# Patient Record
Sex: FEMALE | Race: WHITE | NOT HISPANIC OR LATINO | Employment: OTHER | URBAN - METROPOLITAN AREA
[De-identification: names, ages, dates, MRNs, and addresses within clinical notes are randomized per-mention and may not be internally consistent; named-entity substitution may affect disease eponyms.]

---

## 2017-10-10 DIAGNOSIS — E78.2 MIXED HYPERLIPIDEMIA: ICD-10-CM

## 2017-10-10 DIAGNOSIS — Z13.29 SCREENING FOR THYROID DISORDER: ICD-10-CM

## 2017-10-10 DIAGNOSIS — Z00.00 HEALTH CARE MAINTENANCE: ICD-10-CM

## 2017-10-10 DIAGNOSIS — E55.9 VITAMIN D DEFICIENCY: ICD-10-CM

## 2017-10-11 ENCOUNTER — HOSPITAL ENCOUNTER (OUTPATIENT)
Facility: MEDICAL CENTER | Age: 57
End: 2017-10-11
Attending: FAMILY MEDICINE
Payer: COMMERCIAL

## 2017-10-11 ENCOUNTER — NON-PROVIDER VISIT (OUTPATIENT)
Dept: INTERNAL MEDICINE | Facility: IMAGING CENTER | Age: 57
End: 2017-10-11
Payer: COMMERCIAL

## 2017-10-11 DIAGNOSIS — Z00.00 HEALTH CARE MAINTENANCE: ICD-10-CM

## 2017-10-11 DIAGNOSIS — Z13.29 SCREENING FOR THYROID DISORDER: ICD-10-CM

## 2017-10-11 DIAGNOSIS — Z23 NEED FOR INFLUENZA VACCINATION: ICD-10-CM

## 2017-10-11 DIAGNOSIS — E55.9 VITAMIN D DEFICIENCY: ICD-10-CM

## 2017-10-11 DIAGNOSIS — E78.2 MIXED HYPERLIPIDEMIA: ICD-10-CM

## 2017-10-11 DIAGNOSIS — Z01.89 ENCOUNTER FOR ROUTINE LABORATORY TESTING: ICD-10-CM

## 2017-10-11 LAB
25(OH)D3 SERPL-MCNC: 26 NG/ML (ref 30–100)
ALBUMIN SERPL BCP-MCNC: 4.5 G/DL (ref 3.2–4.9)
ALBUMIN/GLOB SERPL: 1.6 G/DL
ALP SERPL-CCNC: 52 U/L (ref 30–99)
ALT SERPL-CCNC: 15 U/L (ref 2–50)
ANION GAP SERPL CALC-SCNC: 8 MMOL/L (ref 0–11.9)
AST SERPL-CCNC: 23 U/L (ref 12–45)
BASOPHILS # BLD AUTO: 0.7 % (ref 0–1.8)
BASOPHILS # BLD: 0.03 K/UL (ref 0–0.12)
BILIRUB SERPL-MCNC: 0.3 MG/DL (ref 0.1–1.5)
BUN SERPL-MCNC: 13 MG/DL (ref 8–22)
CALCIUM SERPL-MCNC: 9.6 MG/DL (ref 8.5–10.5)
CHLORIDE SERPL-SCNC: 106 MMOL/L (ref 96–112)
CHOLEST SERPL-MCNC: 230 MG/DL (ref 100–199)
CO2 SERPL-SCNC: 27 MMOL/L (ref 20–33)
CREAT SERPL-MCNC: 0.71 MG/DL (ref 0.5–1.4)
EOSINOPHIL # BLD AUTO: 0.08 K/UL (ref 0–0.51)
EOSINOPHIL NFR BLD: 1.9 % (ref 0–6.9)
ERYTHROCYTE [DISTWIDTH] IN BLOOD BY AUTOMATED COUNT: 42 FL (ref 35.9–50)
GFR SERPL CREATININE-BSD FRML MDRD: >60 ML/MIN/1.73 M 2
GLOBULIN SER CALC-MCNC: 2.9 G/DL (ref 1.9–3.5)
GLUCOSE SERPL-MCNC: 73 MG/DL (ref 65–99)
HCT VFR BLD AUTO: 43.4 % (ref 37–47)
HDLC SERPL-MCNC: 78 MG/DL
HGB BLD-MCNC: 14.3 G/DL (ref 12–16)
IMM GRANULOCYTES # BLD AUTO: 0.01 K/UL (ref 0–0.11)
IMM GRANULOCYTES NFR BLD AUTO: 0.2 % (ref 0–0.9)
LDLC SERPL CALC-MCNC: 142 MG/DL
LYMPHOCYTES # BLD AUTO: 1.49 K/UL (ref 1–4.8)
LYMPHOCYTES NFR BLD: 35.3 % (ref 22–41)
MCH RBC QN AUTO: 31.5 PG (ref 27–33)
MCHC RBC AUTO-ENTMCNC: 32.9 G/DL (ref 33.6–35)
MCV RBC AUTO: 95.6 FL (ref 81.4–97.8)
MONOCYTES # BLD AUTO: 0.26 K/UL (ref 0–0.85)
MONOCYTES NFR BLD AUTO: 6.2 % (ref 0–13.4)
NEUTROPHILS # BLD AUTO: 2.35 K/UL (ref 2–7.15)
NEUTROPHILS NFR BLD: 55.7 % (ref 44–72)
NRBC # BLD AUTO: 0 K/UL
NRBC BLD AUTO-RTO: 0 /100 WBC
PLATELET # BLD AUTO: 214 K/UL (ref 164–446)
PMV BLD AUTO: 11 FL (ref 9–12.9)
POTASSIUM SERPL-SCNC: 4 MMOL/L (ref 3.6–5.5)
PROT SERPL-MCNC: 7.4 G/DL (ref 6–8.2)
RBC # BLD AUTO: 4.54 M/UL (ref 4.2–5.4)
SODIUM SERPL-SCNC: 141 MMOL/L (ref 135–145)
TRIGL SERPL-MCNC: 49 MG/DL (ref 0–149)
TSH SERPL DL<=0.005 MIU/L-ACNC: 1.69 UIU/ML (ref 0.3–3.7)
WBC # BLD AUTO: 4.2 K/UL (ref 4.8–10.8)

## 2017-10-11 PROCEDURE — 80053 COMPREHEN METABOLIC PANEL: CPT

## 2017-10-11 PROCEDURE — 90471 IMMUNIZATION ADMIN: CPT | Performed by: FAMILY MEDICINE

## 2017-10-11 PROCEDURE — 82306 VITAMIN D 25 HYDROXY: CPT

## 2017-10-11 PROCEDURE — 84443 ASSAY THYROID STIM HORMONE: CPT

## 2017-10-11 PROCEDURE — 90686 IIV4 VACC NO PRSV 0.5 ML IM: CPT | Performed by: FAMILY MEDICINE

## 2017-10-11 PROCEDURE — 80061 LIPID PANEL: CPT

## 2017-10-11 PROCEDURE — 85025 COMPLETE CBC W/AUTO DIFF WBC: CPT

## 2017-10-23 ENCOUNTER — HOSPITAL ENCOUNTER (OUTPATIENT)
Dept: LAB | Facility: MEDICAL CENTER | Age: 57
End: 2017-10-23
Attending: FAMILY MEDICINE
Payer: COMMERCIAL

## 2017-10-23 ENCOUNTER — OFFICE VISIT (OUTPATIENT)
Dept: INTERNAL MEDICINE | Facility: IMAGING CENTER | Age: 57
End: 2017-10-23
Payer: COMMERCIAL

## 2017-10-23 VITALS
WEIGHT: 138 LBS | HEART RATE: 64 BPM | OXYGEN SATURATION: 98 % | HEIGHT: 66 IN | TEMPERATURE: 97.5 F | BODY MASS INDEX: 22.18 KG/M2 | DIASTOLIC BLOOD PRESSURE: 70 MMHG | RESPIRATION RATE: 14 BRPM | SYSTOLIC BLOOD PRESSURE: 110 MMHG

## 2017-10-23 DIAGNOSIS — E78.2 MIXED HYPERLIPIDEMIA: Chronic | ICD-10-CM

## 2017-10-23 DIAGNOSIS — K90.0 CELIAC DISEASE: Chronic | ICD-10-CM

## 2017-10-23 DIAGNOSIS — Z01.419 WELL WOMAN EXAM WITH ROUTINE GYNECOLOGICAL EXAM: ICD-10-CM

## 2017-10-23 DIAGNOSIS — E55.9 VITAMIN D DEFICIENCY: Chronic | ICD-10-CM

## 2017-10-23 DIAGNOSIS — M19.079 ARTHRITIS OF GREAT TOE AT METATARSOPHALANGEAL JOINT: ICD-10-CM

## 2017-10-23 DIAGNOSIS — E78.41 ELEVATED LP(A): Chronic | ICD-10-CM

## 2017-10-23 DIAGNOSIS — Z78.0 POSTMENOPAUSAL: Chronic | ICD-10-CM

## 2017-10-23 DIAGNOSIS — Z12.4 ROUTINE CERVICAL SMEAR: ICD-10-CM

## 2017-10-23 PROCEDURE — 87624 HPV HI-RISK TYP POOLED RSLT: CPT

## 2017-10-23 PROCEDURE — 88175 CYTOPATH C/V AUTO FLUID REDO: CPT

## 2017-10-23 PROCEDURE — 99396 PREV VISIT EST AGE 40-64: CPT | Performed by: FAMILY MEDICINE

## 2017-10-23 RX ORDER — ESTRADIOL 10 UG/1
INSERT VAGINAL
Qty: 24 TAB | Refills: 3 | Status: SHIPPED | OUTPATIENT
Start: 2017-10-23 | End: 2019-01-28

## 2017-10-23 NOTE — PROGRESS NOTES
Chief Complaint   Patient presents with   • Gynecologic Exam       HPI:  Patient is a 57 y.o. female established patient who presents today to transfer care from Dr. Keene to myself and to have her annual GYN exam/ review recent lab results. She has a history of vitamin D deficiency and has not taken Vitamin D replacement in approximately 6 months. She also has history of mixed dyslipidemia with negative CT Cardiac scoring 9/16/ currently not on treatment/ stopped taking ASA 81 mg daily some time ago for unknown reasons. She has chronic R great toe OA and will continue supportive care at this time. She is post menopausal and had a normal dexa scan 4/4/16. She is not having any specific breast/vaginal issues but is interested in vagifem use to help with vaginal tissue health/ sexual comfort. We discussed benefits of annual versus biannual recommendation of mammography she has received prior to my care, and she will call to schedule appointment. She also has celiac intolerance with negative formal testing in past and eats clean diet daily. She remains physically active and recently hiked [place].     Patient Active Problem List    Diagnosis Date Noted   • Elevated Lp(a) 11/14/2016   • Vitamin D deficiency 11/14/2016   • Mixed hyperlipidemia 09/27/2016   • Family history of ischemic heart disease (IHD)    • Atherosclerosis of both carotid arteries, mild per 2016 Ultrasound 09/16/2016   • Postmenopausal 05/31/2016   • Renal cyst, left 02/16/2016   • Celiac disease 02/13/2015   • FH: renal cell carcinoma 02/13/2015   • Family history of malignant melanoma 02/13/2015     Past medical, surgical, family, and social history was reviewed and updated in Epic chart by me today.     Medications and allergies reviewed and updated in Epic chart by me today.     Recent lab results reviewed with patient at length at today's visit.     ROS:  Pertinent positives listed above in HPI. All other systems have been reviewed and are  "negative.    PE:   /70   Pulse 64   Temp 36.4 °C (97.5 °F)   Resp 14   Ht 1.68 m (5' 6.14\")   Wt 62.6 kg (138 lb)   SpO2 98%   BMI 22.18 kg/m²   Vital signs reviewed with patient.     Gen: Well developed; well nourished; no acute distress; age appropriate appearance   HEENT: Normocephalic; atraumatic; PEERLA b/l; sclera clear b/l; b/l external auditory canals WNL; cerumen noted in R ear but non obstructive with TM visualized/ L TM WNL; oropharynx clear; oral mucosa moist; tongue midline; dentition adequate   Neck: No adenopathy; no thyromegaly  Chest: breasts are symmetrical and no distortion of chest wall noted; No breast skin lesions, retractions, dimpling, nipple discharge, masses or tenderness with palpation b/l. No supraclavicular/ axillary lymphadenopathy noted b/l.   CV: Regular rate and rhythm; S1/ S2 present; no murmur, gallop or rub noted  Pulm: No respiratory distress; clear to ascultation b/l; no wheezing or stridor noted b/l  Abd: Adequate bowel sounds noted; soft and nontender; no rebound, rigidity, nor distention; no hepatosplenogmegaly   Extremities: No peripheral edema b/l LE extremities/ no clubbing nor cyanosis noted  Skin: Warm and dry; no rashes noted   Neuro: No focal deficits noted   Psych: AAOx4; mood and affect are appropriate  : External genitalia normal with no lesions noted. Vaginal vault has no discharge, no abnormal odor and no lesions. Cervix nontender with no lesions. Pap samples taken in normal fashion. Uterus nontender and normal size. Adnexa nontender with no masses palpated. Nl rectal sphincter tone/ no masses noted/ non tender exam/ brown stool in vault: guaiac negative with bedside testing.      A/P:  1. Postmenopausal state  Condition discussed with patient and would like to trial vagifem twice per week.   - estradiol (VAGIFEM) 10 MCG Tab; Insert tablet into vagina twice per week.  Dispense: 24 Tab; Refill: 3    2. Chronic celiac disease  Stable/ pt continue to " avoid gluten in her daily diet with good success.     3. Chronic elevated Lp(a)  Stable    4. Chronic mixed hyperlipidemia  Stable but remains elevated/ CT cardiac scoring negative 9/16. Pt encouraged to restart ASA 81 mg daily and consider daily fish oil supplementation at minimum for risk prevention. Also discussed low dose statin use and she will get back to me in regards to her decision.     5. Chronic vitamin D deficiency  Uncontrolled/ pt encouraged to take vitamin D 2000 IU daily.     6. Well woman exam with routine gynecological exam  No abnormalities noted on exam today. She will call to schedule mammogram ASAP.     7. Routine cervical smear  Samples taken in normal fashion with no abnormalities noted on exam today.     Overall patient is doing very well and will need to be seen annually/ PRN sooner if current condition changes.

## 2017-10-24 LAB
CYTOLOGY REG CYTOL: NORMAL
HPV HR 12 DNA CVX QL NAA+PROBE: NEGATIVE
HPV16 DNA SPEC QL NAA+PROBE: NEGATIVE
HPV18 DNA SPEC QL NAA+PROBE: NEGATIVE
SPECIMEN SOURCE: NORMAL

## 2017-10-27 DIAGNOSIS — E78.2 MIXED HYPERLIPIDEMIA: Chronic | ICD-10-CM

## 2017-10-27 RX ORDER — ATORVASTATIN CALCIUM 20 MG/1
20 TABLET, FILM COATED ORAL
Qty: 90 TAB | Refills: 4 | Status: SHIPPED | OUTPATIENT
Start: 2017-10-27 | End: 2018-07-25 | Stop reason: SDUPTHER

## 2017-11-29 ENCOUNTER — HOSPITAL ENCOUNTER (OUTPATIENT)
Dept: RADIOLOGY | Facility: MEDICAL CENTER | Age: 57
End: 2017-11-29
Attending: FAMILY MEDICINE
Payer: COMMERCIAL

## 2017-11-29 DIAGNOSIS — Z13.9 SCREENING: ICD-10-CM

## 2017-11-29 PROCEDURE — G0202 SCR MAMMO BI INCL CAD: HCPCS

## 2017-12-17 DIAGNOSIS — Z01.00 ENCOUNTER FOR COMPLETE EYE EXAM: ICD-10-CM

## 2018-01-30 ENCOUNTER — NON-PROVIDER VISIT (OUTPATIENT)
Dept: INTERNAL MEDICINE | Facility: IMAGING CENTER | Age: 58
End: 2018-01-30
Payer: COMMERCIAL

## 2018-01-30 ENCOUNTER — HOSPITAL ENCOUNTER (OUTPATIENT)
Facility: MEDICAL CENTER | Age: 58
End: 2018-01-30
Attending: FAMILY MEDICINE
Payer: COMMERCIAL

## 2018-01-30 DIAGNOSIS — E78.2 MIXED HYPERLIPIDEMIA: Chronic | ICD-10-CM

## 2018-01-30 LAB
CHOLEST SERPL-MCNC: 221 MG/DL (ref 100–199)
CK SERPL-CCNC: 100 U/L (ref 0–154)
HDLC SERPL-MCNC: 112 MG/DL
LDLC SERPL CALC-MCNC: 97 MG/DL
TRIGL SERPL-MCNC: 60 MG/DL (ref 0–149)

## 2018-01-30 PROCEDURE — 80061 LIPID PANEL: CPT

## 2018-01-30 PROCEDURE — 82550 ASSAY OF CK (CPK): CPT

## 2018-02-01 DIAGNOSIS — E78.2 MIXED HYPERLIPIDEMIA: Chronic | ICD-10-CM

## 2018-03-12 ENCOUNTER — OFFICE VISIT (OUTPATIENT)
Dept: INTERNAL MEDICINE | Facility: IMAGING CENTER | Age: 58
End: 2018-03-12
Payer: COMMERCIAL

## 2018-03-12 VITALS
RESPIRATION RATE: 14 BRPM | WEIGHT: 140 LBS | HEART RATE: 67 BPM | DIASTOLIC BLOOD PRESSURE: 74 MMHG | HEIGHT: 66 IN | BODY MASS INDEX: 22.5 KG/M2 | SYSTOLIC BLOOD PRESSURE: 124 MMHG | TEMPERATURE: 99.1 F | OXYGEN SATURATION: 95 %

## 2018-03-12 DIAGNOSIS — Z86.19 HISTORY OF INFECTIOUS DIARRHEA: ICD-10-CM

## 2018-03-12 DIAGNOSIS — H61.21 IMPACTED CERUMEN OF RIGHT EAR: ICD-10-CM

## 2018-03-12 PROCEDURE — 99213 OFFICE O/P EST LOW 20 MIN: CPT | Performed by: FAMILY MEDICINE

## 2018-03-12 RX ORDER — MULTIVITAMIN WITH IRON
250 TABLET ORAL DAILY
COMMUNITY
End: 2019-01-28

## 2018-03-12 RX ORDER — MULTIVIT-MIN/IRON/FOLIC ACID/K 18-600-40
2000 CAPSULE ORAL DAILY
COMMUNITY

## 2018-03-12 RX ORDER — CHLORAL HYDRATE 500 MG
1000 CAPSULE ORAL DAILY
COMMUNITY
End: 2019-01-28

## 2018-03-13 NOTE — PROGRESS NOTES
"Chief Complaint   Patient presents with   • Diarrhea       HPI:  Patient is a 57 y.o. female established patient who presents today for evaluation of new watery diarrhea that originally occurred when she was visiting [place] approximately 11 days ago. She reports abrupt onset of diarrhea with abdominal cramping but no associated N/V/F/increased gas and without known exposure etiology. She then traveled straight to [place] to visit her mother, and the watery diarrhea persisted for a week. She decided to take Imodium this past weekend for her long flight back to [place] and reports that her diarrhea and abdominal cramping have resolved. Her last BM was Saturday and she continues to eat/ tolerate a bland diet. She otherwise feels well at this time and has brought in list of her daily supplements to update our records.     Patient Active Problem List    Diagnosis Date Noted   • 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 10/23/2017   • Elevated Lp(a) 11/14/2016   • Vitamin D deficiency 11/14/2016   • Mixed hyperlipidemia 09/27/2016   • Family history of ischemic heart disease (IHD)    • Atherosclerosis of both carotid arteries, mild per 2016 Ultrasound 09/16/2016   • Postmenopausal 05/31/2016   • Renal cyst, left 02/16/2016   • Celiac disease 02/13/2015   • FH: renal cell carcinoma 02/13/2015   • Family history of malignant melanoma 02/13/2015     Past medical, surgical, family, and social history was reviewed in Epic chart by me today.     Medications and allergies reviewed and updated in Epic chart by me today.     ROS:  Pertinent positives listed above in HPI. All other systems have been reviewed and are negative.    PE:   /74   Pulse 67   Temp 37.3 °C (99.1 °F)   Resp 14   Ht 1.68 m (5' 6.14\")   Wt 63.5 kg (140 lb)   SpO2 95%   BMI 22.50 kg/m²   Vital signs reviewed with patient.     Gen: Well developed; well nourished; no acute distress; non toxic appearance   HEENT: Normocephalic; atraumatic; " PEERLA b/l; sclera clear b/l; b/l external auditory canals WNL; impacted R cerumen; L TM WNL; oropharynx clear; oral mucosa moist; tongue midline; dentition adequate   Neck: No adenopathy; no thyromegaly  CV: Regular rate and rhythm; S1/ S2 present; no murmur, gallop or rub noted  Pulm: No respiratory distress; clear to ascultation b/l; no wheezing or stridor noted b/l  Abd: Adequate bowel sounds noted; soft and nontender; no rebound, rigidity, nor distention  Extremities: No peripheral edema b/l LE extremities/ no clubbing nor cyanosis noted  Skin: Warm and dry; no rashes noted   Neuro: No focal deficits noted   Psych: AAOx4; mood and affect are appropriate    A/P:  1. History of infectious diarrhea  Resolved/ likely due to food exposure in [place]/ recommend patient obtain stool samples for processing if diarrhea does reappear. Continue bland diet as tolerated and advance as tolerated/ maintain hydration.   - STOOL WBC'S; Future  - CULTURE STOOL; Future  - COMPLETE O&P; Future     2. Impacted cerumen of right ear  Rhonda VEGA will flush ear at visit today. Recommend pt use Debrox OTC drops as needed once ear canal is flushed.     Pt is to call our office if condition changes and can coordinate sample drop off PRN with Rhonda VEGA.

## 2018-03-14 ENCOUNTER — HOSPITAL ENCOUNTER (OUTPATIENT)
Facility: MEDICAL CENTER | Age: 58
End: 2018-03-14
Attending: FAMILY MEDICINE
Payer: COMMERCIAL

## 2018-03-14 DIAGNOSIS — Z86.19 HISTORY OF INFECTIOUS DIARRHEA: ICD-10-CM

## 2018-03-14 LAB — WBC STL QL MICRO: ABNORMAL

## 2018-03-14 PROCEDURE — 89055 LEUKOCYTE ASSESSMENT FECAL: CPT

## 2018-03-16 ENCOUNTER — HOSPITAL ENCOUNTER (OUTPATIENT)
Facility: MEDICAL CENTER | Age: 58
End: 2018-03-16
Attending: FAMILY MEDICINE
Payer: COMMERCIAL

## 2018-03-16 ENCOUNTER — NON-PROVIDER VISIT (OUTPATIENT)
Dept: INTERNAL MEDICINE | Facility: IMAGING CENTER | Age: 58
End: 2018-03-16
Payer: COMMERCIAL

## 2018-03-16 DIAGNOSIS — Z86.19 HISTORY OF INFECTIOUS DIARRHEA: ICD-10-CM

## 2018-03-16 PROCEDURE — 87046 STOOL CULTR AEROBIC BACT EA: CPT

## 2018-03-16 PROCEDURE — 87177 OVA AND PARASITES SMEARS: CPT

## 2018-03-16 PROCEDURE — 87045 FECES CULTURE AEROBIC BACT: CPT

## 2018-03-16 NOTE — NON-PROVIDER
"Right ear canal successfully lavaged with warm water.  Patient tolerated procedure well.  Dr Tadeo examined both canals & TM's due to benign cysts bilaterally.  \"Normal variant\" per Dr Tadeo.  "

## 2018-03-19 LAB
BACTERIA STL CULT: NORMAL
O+P SPEC MICRO: NORMAL
SIGNIFICANT IND 70042: NORMAL
SIGNIFICANT IND 70042: NORMAL
SITE SITE: NORMAL
SITE SITE: NORMAL
SOURCE SOURCE: NORMAL
SOURCE SOURCE: NORMAL

## 2018-03-20 ENCOUNTER — HOSPITAL ENCOUNTER (OUTPATIENT)
Facility: MEDICAL CENTER | Age: 58
End: 2018-03-20
Attending: FAMILY MEDICINE
Payer: COMMERCIAL

## 2018-03-20 ENCOUNTER — NON-PROVIDER VISIT (OUTPATIENT)
Dept: INTERNAL MEDICINE | Facility: IMAGING CENTER | Age: 58
End: 2018-03-20
Payer: COMMERCIAL

## 2018-03-20 DIAGNOSIS — A09 INFECTIOUS DIARRHEA: ICD-10-CM

## 2018-03-20 PROCEDURE — 87045 FECES CULTURE AEROBIC BACT: CPT

## 2018-03-20 PROCEDURE — 87046 STOOL CULTR AEROBIC BACT EA: CPT

## 2018-03-20 PROCEDURE — 87899 AGENT NOS ASSAY W/OPTIC: CPT

## 2018-03-21 LAB
E COLI SXT1+2 STL IA: NORMAL
SIGNIFICANT IND 70042: NORMAL
SITE SITE: NORMAL
SOURCE SOURCE: NORMAL

## 2018-03-23 LAB
BACTERIA STL CULT: NORMAL
E COLI SXT1+2 STL IA: NORMAL
SIGNIFICANT IND 70042: NORMAL
SITE SITE: NORMAL
SOURCE SOURCE: NORMAL

## 2018-07-25 DIAGNOSIS — E78.2 MIXED HYPERLIPIDEMIA: Chronic | ICD-10-CM

## 2018-07-25 RX ORDER — ATORVASTATIN CALCIUM 20 MG/1
20 TABLET, FILM COATED ORAL
Qty: 90 TAB | Refills: 4 | Status: SHIPPED | OUTPATIENT
Start: 2018-07-25 | End: 2019-07-27 | Stop reason: SDUPTHER

## 2018-09-26 ENCOUNTER — NON-PROVIDER VISIT (OUTPATIENT)
Dept: INTERNAL MEDICINE | Facility: IMAGING CENTER | Age: 58
End: 2018-09-26
Payer: COMMERCIAL

## 2018-09-26 DIAGNOSIS — Z23 NEED FOR INFLUENZA VACCINATION: ICD-10-CM

## 2018-09-26 PROCEDURE — 90471 IMMUNIZATION ADMIN: CPT | Performed by: FAMILY MEDICINE

## 2018-09-26 PROCEDURE — 90686 IIV4 VACC NO PRSV 0.5 ML IM: CPT | Performed by: FAMILY MEDICINE

## 2018-12-11 ENCOUNTER — HOSPITAL ENCOUNTER (OUTPATIENT)
Dept: RADIOLOGY | Facility: MEDICAL CENTER | Age: 58
End: 2018-12-11
Attending: FAMILY MEDICINE
Payer: COMMERCIAL

## 2018-12-11 DIAGNOSIS — Z12.31 VISIT FOR SCREENING MAMMOGRAM: ICD-10-CM

## 2018-12-11 PROCEDURE — 77067 SCR MAMMO BI INCL CAD: CPT

## 2019-01-21 DIAGNOSIS — E55.9 VITAMIN D DEFICIENCY: Chronic | ICD-10-CM

## 2019-01-21 DIAGNOSIS — Z00.00 HEALTH CARE MAINTENANCE: ICD-10-CM

## 2019-01-21 DIAGNOSIS — E78.41 ELEVATED LP(A): Chronic | ICD-10-CM

## 2019-01-21 DIAGNOSIS — E78.2 MIXED HYPERLIPIDEMIA: Chronic | ICD-10-CM

## 2019-01-22 ENCOUNTER — NON-PROVIDER VISIT (OUTPATIENT)
Dept: INTERNAL MEDICINE | Facility: IMAGING CENTER | Age: 59
End: 2019-01-22
Payer: COMMERCIAL

## 2019-01-22 ENCOUNTER — HOSPITAL ENCOUNTER (OUTPATIENT)
Facility: MEDICAL CENTER | Age: 59
End: 2019-01-22
Attending: FAMILY MEDICINE
Payer: COMMERCIAL

## 2019-01-22 DIAGNOSIS — E78.2 MIXED HYPERLIPIDEMIA: Chronic | ICD-10-CM

## 2019-01-22 DIAGNOSIS — Z00.00 HEALTH CARE MAINTENANCE: ICD-10-CM

## 2019-01-22 DIAGNOSIS — Z23 NEED FOR SHINGLES VACCINE: ICD-10-CM

## 2019-01-22 DIAGNOSIS — E78.41 ELEVATED LP(A): Chronic | ICD-10-CM

## 2019-01-22 DIAGNOSIS — E55.9 VITAMIN D DEFICIENCY: Chronic | ICD-10-CM

## 2019-01-22 LAB
25(OH)D3 SERPL-MCNC: 34 NG/ML (ref 30–100)
ALBUMIN SERPL BCP-MCNC: 4.7 G/DL (ref 3.2–4.9)
ALBUMIN/GLOB SERPL: 1.7 G/DL
ALP SERPL-CCNC: 55 U/L (ref 30–99)
ALT SERPL-CCNC: 31 U/L (ref 2–50)
ANION GAP SERPL CALC-SCNC: 7 MMOL/L (ref 0–11.9)
AST SERPL-CCNC: 32 U/L (ref 12–45)
BASOPHILS # BLD AUTO: 0.7 % (ref 0–1.8)
BASOPHILS # BLD: 0.03 K/UL (ref 0–0.12)
BILIRUB SERPL-MCNC: 0.7 MG/DL (ref 0.1–1.5)
BUN SERPL-MCNC: 13 MG/DL (ref 8–22)
CALCIUM SERPL-MCNC: 10.2 MG/DL (ref 8.5–10.5)
CHLORIDE SERPL-SCNC: 104 MMOL/L (ref 96–112)
CHOLEST SERPL-MCNC: 186 MG/DL (ref 100–199)
CO2 SERPL-SCNC: 29 MMOL/L (ref 20–33)
CREAT SERPL-MCNC: 0.78 MG/DL (ref 0.5–1.4)
CRP SERPL HS-MCNC: 1.8 MG/L (ref 0–7.5)
EOSINOPHIL # BLD AUTO: 0.16 K/UL (ref 0–0.51)
EOSINOPHIL NFR BLD: 3.6 % (ref 0–6.9)
ERYTHROCYTE [DISTWIDTH] IN BLOOD BY AUTOMATED COUNT: 42.2 FL (ref 35.9–50)
GLOBULIN SER CALC-MCNC: 2.8 G/DL (ref 1.9–3.5)
GLUCOSE SERPL-MCNC: 82 MG/DL (ref 65–99)
HCT VFR BLD AUTO: 44.4 % (ref 37–47)
HDLC SERPL-MCNC: 90 MG/DL
HGB BLD-MCNC: 14 G/DL (ref 12–16)
IMM GRANULOCYTES # BLD AUTO: 0.01 K/UL (ref 0–0.11)
IMM GRANULOCYTES NFR BLD AUTO: 0.2 % (ref 0–0.9)
LDLC SERPL CALC-MCNC: 84 MG/DL
LYMPHOCYTES # BLD AUTO: 1.54 K/UL (ref 1–4.8)
LYMPHOCYTES NFR BLD: 34.2 % (ref 22–41)
MCH RBC QN AUTO: 30.5 PG (ref 27–33)
MCHC RBC AUTO-ENTMCNC: 31.5 G/DL (ref 33.6–35)
MCV RBC AUTO: 96.7 FL (ref 81.4–97.8)
MONOCYTES # BLD AUTO: 0.34 K/UL (ref 0–0.85)
MONOCYTES NFR BLD AUTO: 7.6 % (ref 0–13.4)
NEUTROPHILS # BLD AUTO: 2.42 K/UL (ref 2–7.15)
NEUTROPHILS NFR BLD: 53.7 % (ref 44–72)
NRBC # BLD AUTO: 0 K/UL
NRBC BLD-RTO: 0 /100 WBC
PLATELET # BLD AUTO: 164 K/UL (ref 164–446)
PMV BLD AUTO: 11.6 FL (ref 9–12.9)
POTASSIUM SERPL-SCNC: 3.6 MMOL/L (ref 3.6–5.5)
PROT SERPL-MCNC: 7.5 G/DL (ref 6–8.2)
RBC # BLD AUTO: 4.59 M/UL (ref 4.2–5.4)
SODIUM SERPL-SCNC: 140 MMOL/L (ref 135–145)
TRIGL SERPL-MCNC: 58 MG/DL (ref 0–149)
TSH SERPL DL<=0.005 MIU/L-ACNC: 1.99 UIU/ML (ref 0.38–5.33)
WBC # BLD AUTO: 4.5 K/UL (ref 4.8–10.8)

## 2019-01-22 PROCEDURE — 90750 HZV VACC RECOMBINANT IM: CPT | Performed by: FAMILY MEDICINE

## 2019-01-22 PROCEDURE — 83695 ASSAY OF LIPOPROTEIN(A): CPT

## 2019-01-22 PROCEDURE — 84443 ASSAY THYROID STIM HORMONE: CPT

## 2019-01-22 PROCEDURE — 85025 COMPLETE CBC W/AUTO DIFF WBC: CPT

## 2019-01-22 PROCEDURE — 86141 C-REACTIVE PROTEIN HS: CPT

## 2019-01-22 PROCEDURE — 80053 COMPREHEN METABOLIC PANEL: CPT

## 2019-01-22 PROCEDURE — 80061 LIPID PANEL: CPT

## 2019-01-22 PROCEDURE — 90471 IMMUNIZATION ADMIN: CPT | Performed by: FAMILY MEDICINE

## 2019-01-22 PROCEDURE — 82306 VITAMIN D 25 HYDROXY: CPT

## 2019-01-23 LAB — LPA SERPL-MCNC: 97 MG/DL

## 2019-01-28 ENCOUNTER — OFFICE VISIT (OUTPATIENT)
Dept: INTERNAL MEDICINE | Facility: IMAGING CENTER | Age: 59
End: 2019-01-28
Payer: COMMERCIAL

## 2019-01-28 VITALS
DIASTOLIC BLOOD PRESSURE: 60 MMHG | BODY MASS INDEX: 22.18 KG/M2 | SYSTOLIC BLOOD PRESSURE: 114 MMHG | HEART RATE: 74 BPM | WEIGHT: 138 LBS | RESPIRATION RATE: 14 BRPM | TEMPERATURE: 98.1 F | OXYGEN SATURATION: 98 % | HEIGHT: 66 IN

## 2019-01-28 DIAGNOSIS — M19.079 ARTHRITIS OF METATARSOPHALANGEAL (MTP) JOINT OF GREAT TOE: ICD-10-CM

## 2019-01-28 DIAGNOSIS — E55.9 VITAMIN D DEFICIENCY: Chronic | ICD-10-CM

## 2019-01-28 DIAGNOSIS — Z00.00 WELLNESS EXAMINATION: ICD-10-CM

## 2019-01-28 DIAGNOSIS — Z80.51 FH: RENAL CELL CARCINOMA: ICD-10-CM

## 2019-01-28 DIAGNOSIS — K90.0 CELIAC DISEASE: Chronic | ICD-10-CM

## 2019-01-28 DIAGNOSIS — E78.2 MIXED HYPERLIPIDEMIA: Chronic | ICD-10-CM

## 2019-01-28 DIAGNOSIS — N28.1 RENAL CYST, LEFT: ICD-10-CM

## 2019-01-28 DIAGNOSIS — E78.41 ELEVATED LP(A): Chronic | ICD-10-CM

## 2019-01-28 PROCEDURE — 99396 PREV VISIT EST AGE 40-64: CPT | Performed by: FAMILY MEDICINE

## 2019-01-28 NOTE — PROGRESS NOTES
Chief Complaint   Patient presents with   • Annual Exam       HPI:  Patient is a 58 y.o. female established patient who presents today for her annual wellness exam. She reports being in excellent health overall and remains active (skiied [employer] this morning)/ adheres to a clean diet. She has chronic mixed dyslipidemia on daily statin and known elevated Lp(a) levels historically. She also has chronic migraines with recent episodes involving ocular episodes of right eye (floaters/ bright flashing lights). She denies vision changes and has an appointment with [facility] Wednesday for her annual exam. She also has a history of vitamin D deficiency and takes daily supplementation and would like to update her renal US this year to monitor know left renal cysts/ paternal family history of renal cell carcinoma. She also has intermittent arthritis pain of right great toe joint and has seen podiatry in past for evaluation. She endorses 100% medication compliance and is in excellent spirits today.     Patient Active Problem List    Diagnosis Date Noted   • 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 10/23/2017   • Elevated Lp(a) 11/14/2016   • Vitamin D deficiency 11/14/2016   • Mixed hyperlipidemia 09/27/2016   • Family history of ischemic heart disease (IHD)    • Atherosclerosis of both carotid arteries, mild per 2016 Ultrasound 09/16/2016   • Postmenopausal 05/31/2016   • Renal cyst, left 02/16/2016   • Chronic migraine 02/13/2015   • Celiac disease 02/13/2015   • FH: renal cell carcinoma 02/13/2015   • Family history of malignant melanoma 02/13/2015       Past medical, surgical, family, and social history was reviewed and updated in Epic chart by me today.     Medications and allergies reviewed and updated in Epic chart by me today.     Lab results 1/22/19 reviewed with patient in detail at visit today.     ROS:  Pertinent positives listed above in HPI. All other systems have been reviewed and are  "negative.    PE:   /60 (BP Location: Left arm, Patient Position: Sitting)   Pulse 74   Temp 36.7 °C (98.1 °F) (Temporal)   Resp 14   Ht 1.676 m (5' 6\")   Wt 62.6 kg (138 lb)   LMP 08/25/2008   SpO2 98%   BMI 22.27 kg/m²   Vital signs reviewed with patient.     Gen: Well developed; well nourished; no acute distress; age appropriate appearance   HEENT: Normocephalic; atraumatic; PEERLA b/l; sclera clear b/l; b/l external auditory canals WNL; b/l TM WNL; nares patent; oropharynx clear; oral mucosa moist; tongue midline; dentition adequate   Neck: No adenopathy; no thyromegaly  CV: Regular rate and rhythm; S1/ S2 present; no murmur, gallop or rub noted  Pulm: No respiratory distress; clear to ascultation b/l; no wheezing or stridor noted b/l  Abd: Adequate bowel sounds noted; soft and nontender; no rebound, rigidity, nor distention   Lower extremities: No peripheral edema b/l LE extremities/ no clubbing nor cyanosis noted; right foot is intact and no gross deformity of right great toe MTP joint present today/ good foot health noted  Skin: Warm and dry; no rashes noted   Neuro: No focal deficits noted   Psych: AAOx4; mood and affect are appropriate    A/P:  1. Arthritis of metatarsophalangeal (MTP) joint of great toe  Pt has seen podiatry in the past and is not interested in foot surgery. She has not tried topical agents in the past and will trial Voltaren gel as needed. New RX sent to pharmacy.   - Diclofenac Sodium 1 % Gel; Apply small amount to right great toe joint four times per day as needed for arthritis pain.  Dispense: 1 Tube; Refill: 3    2. Renal cyst, left  Last US done in 2016 and will update US in 2019 for comparison. Pt provided with number to call to schedule imaging exam.   - US-RENAL; Future    3. FH: renal cell carcinoma  Strong paternal history - refer to #2.  - US-RENAL; Future    4. Vitamin D deficiency  Stable/ well controlled with daily vitamin D supplementation.     5. Mixed " hyperlipidemia  Stable/ HDL remains elevated for additional protection/ pt is to continue daily statin use and healthy diet choices.     6. Elevated Lp(a)  Improved/ pt is well versed in CV risk factors and is to continue daily ASA and statin use, clean diet, and regular exercise.     7. Chronic migraine  Pt is having right eye symptoms with recent headaches and has appointment Wednesday at [facility] for annual exam.    8. Celiac disease  Well controlled with no recent flair reported.     9. Wellness examination  Pt is doing well at this time and is UTD with all HCM items.    Pt is stable and will contact our office if current condition changes.

## 2019-02-15 ENCOUNTER — HOSPITAL ENCOUNTER (OUTPATIENT)
Dept: RADIOLOGY | Facility: MEDICAL CENTER | Age: 59
End: 2019-02-15
Attending: FAMILY MEDICINE
Payer: COMMERCIAL

## 2019-02-15 DIAGNOSIS — Z80.51 FH: RENAL CELL CARCINOMA: ICD-10-CM

## 2019-02-15 DIAGNOSIS — N28.1 RENAL CYST, LEFT: ICD-10-CM

## 2019-02-15 PROCEDURE — 76775 US EXAM ABDO BACK WALL LIM: CPT

## 2019-03-08 DIAGNOSIS — M19.079 ARTHRITIS OF METATARSOPHALANGEAL (MTP) JOINT OF GREAT TOE: ICD-10-CM

## 2019-03-11 DIAGNOSIS — M19.079 ARTHRITIS OF METATARSOPHALANGEAL (MTP) JOINT OF GREAT TOE: ICD-10-CM

## 2019-04-05 ENCOUNTER — NON-PROVIDER VISIT (OUTPATIENT)
Dept: INTERNAL MEDICINE | Facility: IMAGING CENTER | Age: 59
End: 2019-04-05
Payer: COMMERCIAL

## 2019-04-05 DIAGNOSIS — H53.9 VISUAL CHANGES: ICD-10-CM

## 2019-04-05 DIAGNOSIS — Z09 NEED FOR IMMUNIZATION FOLLOW-UP: ICD-10-CM

## 2019-04-05 PROCEDURE — 90750 HZV VACC RECOMBINANT IM: CPT | Performed by: FAMILY MEDICINE

## 2019-04-05 PROCEDURE — 90471 IMMUNIZATION ADMIN: CPT | Performed by: FAMILY MEDICINE

## 2019-05-16 ENCOUNTER — TELEPHONE (OUTPATIENT)
Dept: SURGERY | Facility: MEDICAL CENTER | Age: 59
End: 2019-05-16

## 2019-05-24 ENCOUNTER — TELEPHONE (OUTPATIENT)
Dept: OPHTHALMOLOGY | Facility: MEDICAL CENTER | Age: 59
End: 2019-05-24

## 2019-06-03 ENCOUNTER — TELEPHONE (OUTPATIENT)
Dept: OPHTHALMOLOGY | Facility: MEDICAL CENTER | Age: 59
End: 2019-06-03

## 2019-07-17 ENCOUNTER — APPOINTMENT (RX ONLY)
Dept: URBAN - METROPOLITAN AREA CLINIC 35 | Facility: CLINIC | Age: 59
Setting detail: DERMATOLOGY
End: 2019-07-17

## 2019-07-17 DIAGNOSIS — D22 MELANOCYTIC NEVI: ICD-10-CM

## 2019-07-17 DIAGNOSIS — L81.4 OTHER MELANIN HYPERPIGMENTATION: ICD-10-CM

## 2019-07-17 DIAGNOSIS — Z71.89 OTHER SPECIFIED COUNSELING: ICD-10-CM

## 2019-07-17 DIAGNOSIS — L82.1 OTHER SEBORRHEIC KERATOSIS: ICD-10-CM

## 2019-07-17 PROBLEM — D22.61 MELANOCYTIC NEVI OF RIGHT UPPER LIMB, INCLUDING SHOULDER: Status: ACTIVE | Noted: 2019-07-17

## 2019-07-17 PROCEDURE — 99213 OFFICE O/P EST LOW 20 MIN: CPT

## 2019-07-17 PROCEDURE — ? COUNSELING

## 2019-07-17 ASSESSMENT — LOCATION SIMPLE DESCRIPTION DERM
LOCATION SIMPLE: RIGHT SHOULDER
LOCATION SIMPLE: POSTERIOR NECK

## 2019-07-17 ASSESSMENT — LOCATION ZONE DERM
LOCATION ZONE: NECK
LOCATION ZONE: ARM

## 2019-07-17 ASSESSMENT — LOCATION DETAILED DESCRIPTION DERM
LOCATION DETAILED: RIGHT POSTERIOR SHOULDER
LOCATION DETAILED: RIGHT LATERAL TRAPEZIAL NECK

## 2019-07-26 DIAGNOSIS — H57.89 EYE INFLAMMATION: ICD-10-CM

## 2019-07-26 RX ORDER — TOBRAMYCIN AND DEXAMETHASONE 3; 1 MG/ML; MG/ML
1 SUSPENSION/ DROPS OPHTHALMIC 4 TIMES DAILY
Qty: 1 BOTTLE | Refills: 1 | Status: SHIPPED | OUTPATIENT
Start: 2019-07-26 | End: 2019-08-02

## 2019-08-07 ENCOUNTER — OFFICE VISIT (OUTPATIENT)
Dept: OPHTHALMOLOGY | Facility: MEDICAL CENTER | Age: 59
End: 2019-08-07
Payer: COMMERCIAL

## 2019-08-07 DIAGNOSIS — H10.32 ACUTE CONJUNCTIVITIS OF LEFT EYE, UNSPECIFIED ACUTE CONJUNCTIVITIS TYPE: ICD-10-CM

## 2019-08-07 DIAGNOSIS — H46.9 OPTIC NEUROPATHY: ICD-10-CM

## 2019-08-07 DIAGNOSIS — H43.813 PVD (POSTERIOR VITREOUS DETACHMENT), BILATERAL: ICD-10-CM

## 2019-08-07 PROCEDURE — 92133 CPTRZD OPH DX IMG PST SGM ON: CPT | Performed by: OPHTHALMOLOGY

## 2019-08-07 PROCEDURE — 99204 OFFICE O/P NEW MOD 45 MIN: CPT | Mod: 25 | Performed by: OPHTHALMOLOGY

## 2019-08-07 RX ORDER — PHENYLEPHRINE HYDROCHLORIDE 25 MG/ML
1 SOLUTION/ DROPS OPHTHALMIC ONCE
Status: COMPLETED | OUTPATIENT
Start: 2019-08-07 | End: 2019-08-07

## 2019-08-07 RX ORDER — TROPICAMIDE 10 MG/ML
1 SOLUTION/ DROPS OPHTHALMIC ONCE
Status: COMPLETED | OUTPATIENT
Start: 2019-08-07 | End: 2019-08-07

## 2019-08-07 RX ADMIN — PHENYLEPHRINE HYDROCHLORIDE 1 DROP: 25 SOLUTION/ DROPS OPHTHALMIC at 11:52

## 2019-08-07 RX ADMIN — TROPICAMIDE 1 DROP: 10 SOLUTION/ DROPS OPHTHALMIC at 11:53

## 2019-08-07 ASSESSMENT — REFRACTION_MANIFEST
OD_CYLINDER: +2.25
METHOD_AUTOREFRACTION: 1
OD_AXIS: 090
OS_SPHERE: -2.25
OS_AXIS: 096
OD_SPHERE: -1.50
OS_CYLINDER: +2.00

## 2019-08-07 ASSESSMENT — REFRACTION_WEARINGRX
OD_CYLINDER: +1.75
OD_ADD: +1.50
OD_AXIS: 120
OD_SPHERE: -0.75
SPECS_TYPE: TRIFOCAL
OS_SPHERE: -2.75
OS_AXIS: 087
OS_ADD: +1.75
OS_CYLINDER: +1.75

## 2019-08-07 ASSESSMENT — VISUAL ACUITY
OD_CC: 20/25
OS_CC: 20/25
METHOD: SNELLEN - LINEAR

## 2019-08-07 ASSESSMENT — EXTERNAL EXAM - LEFT EYE: OS_EXAM: NORMAL

## 2019-08-07 ASSESSMENT — TONOMETRY
OD_IOP_MMHG: 18
OS_IOP_MMHG: 19
IOP_METHOD: APPLANATION

## 2019-08-07 ASSESSMENT — CUP TO DISC RATIO
OD_RATIO: 0.2
OS_RATIO: 0.2

## 2019-08-07 ASSESSMENT — SLIT LAMP EXAM - LIDS: COMMENTS: NORMAL

## 2019-08-07 ASSESSMENT — CONF VISUAL FIELD
OD_NORMAL: 1
OS_NORMAL: 1

## 2019-08-07 ASSESSMENT — EXTERNAL EXAM - RIGHT EYE: OD_EXAM: NORMAL

## 2019-08-07 NOTE — ASSESSMENT & PLAN NOTE
8/7/2019 - Posterior vitreous detacmnets keshia anne leading to flashes and floaters. No retinal holes or tears, no peripheral retinal pigmentary changes. Also obtain OCT NFL thickness that was 79 OD and 79 OS with normal ganglion cell thickness, so no evidence of an optic neuropathy or retrograde axonal degeneration. Thus discussed the warning signs of retinal holes and tears. Recommended re-eval retina in 6 months or sooner if symptoms worsen.

## 2019-08-07 NOTE — PROGRESS NOTES
Peds/Neuro Ophthalmology:   Daniel Tatum M.D.    Date & Time note created:    8/7/2019   1:20 PM     Referring MD / APRN:  Urszula Guzmán M.D., No att. providers found    Patient ID:  Name:             Lily Sawant   YOB: 1960  Age:                 59 y.o.  female   MRN:               [id number]    Chief Complaint/Reason for Visit:     Other (Flashes and floaters)      History of Present Illness:    PETER Sawant is a 59 y.o. female   Ref by her  Roman Sawant for flashes and floaters both eyes since January 2019.no pain but pt just getting over eye infection.Pt was on poly dex but eyes feel better. States that no pain. Improvement in the redness with the tobradex in the left eye. Noeved small leion involving the left upper lid braden possible has developed since the infection in the left eye.       Review of Systems:  Review of Systems   All other systems reviewed and are negative.      Past Medical History:   Past Medical History:   Diagnosis Date   • Celiac disease 2/13/2015   • Family history of ischemic heart disease (IHD)    • FH: renal cell carcinoma 2/13/2015   • Hyperlipidemia 2/13/2015   • Migraine headache 2/13/2015       Past Surgical History:  Past Surgical History:   Procedure Laterality Date   • DILATION AND EVACUATION  1985 & 1987   • TONSILLECTOMY         Current Outpatient Medications:  Current Outpatient Medications   Medication Sig Dispense Refill   • atorvastatin (LIPITOR) 20 MG Tab TAKE 1 TABLET BY MOUTH EVERY DAY AT NIGHT 90 Tab 1   • Diclofenac Sodium 1 % Gel Apply small amount to right great toe joint four times per day as needed for arthritis pain. 1 Tube 3   • Multiple Vitamins-Minerals (WOMENS DAILY FORMULA PO) Take  by mouth.     • Cholecalciferol (VITAMIN D) 2000 units Cap Take 2,000 Units by mouth every day.     • aspirin EC (ECOTRIN) 81 MG Tablet Delayed Response Take 81 mg by mouth every day.       No current facility-administered  medications for this visit.        Allergies:  No Known Allergies    Family History:  Family History   Problem Relation Age of Onset   • Arthritis Mother    • Cancer Sister         Melanoma   • Cancer Father         Renal & Lung   • Lung Disease Father    • Cancer Brother         Melanoma - eye   • Other Child         SIDS   • Cancer Paternal Grandfather         Renal   • Heart Disease Maternal Grandmother    • Heart Attack Maternal Uncle 65        MI       Social History:  Social History     Socioeconomic History   • Marital status:      Spouse name: Not on file   • Number of children: Not on file   • Years of education: Not on file   • Highest education level: Not on file   Occupational History   • Not on file   Social Needs   • Financial resource strain: Not on file   • Food insecurity:     Worry: Not on file     Inability: Not on file   • Transportation needs:     Medical: Not on file     Non-medical: Not on file   Tobacco Use   • Smoking status: Former Smoker     Packs/day: 0.25     Years: 3.00     Pack years: 0.75     Types: Cigarettes     Last attempt to quit: 1967     Years since quittin.5   • Smokeless tobacco: Never Used   Substance and Sexual Activity   • Alcohol use: Yes     Alcohol/week: 7.0 oz     Types: 14 Glasses of wine per week   • Drug use: No   • Sexual activity: Yes     Partners: Male   Lifestyle   • Physical activity:     Days per week: Not on file     Minutes per session: Not on file   • Stress: Not on file   Relationships   • Social connections:     Talks on phone: Not on file     Gets together: Not on file     Attends Alevism service: Not on file     Active member of club or organization: Not on file     Attends meetings of clubs or organizations: Not on file     Relationship status: Not on file   • Intimate partner violence:     Fear of current or ex partner: Not on file     Emotionally abused: Not on file     Physically abused: Not on file     Forced sexual activity: Not  on file   Other Topics Concern   • Not on file   Social History Narrative   • Not on file          Physical Exam:  Physical Exam    Oriented x 3  Weight/BMI: There is no height or weight on file to calculate BMI.  There were no vitals taken for this visit.    Base Eye Exam     Visual Acuity (Snellen - Linear)       Right Left    Dist cc 20/25 20/25          Tonometry (Applanation, 11:47 AM)       Right Left    Pressure 18 19          Pupils       Pupils    Right PERRL    Left PERRL          Visual Fields       Right Left     Full Full          Extraocular Movement       Right Left     Full, Ortho Full, Ortho          Neuro/Psych     Oriented x3:  Yes    Mood/Affect:  Normal          Dilation     Both eyes:  Tropicamide (MYDRIACYL) 1% ophthalmic solution, Phenylephrine (NEOSYNEPHRINE) ophthalmic solution 2.5% @ 1:10 PM            Additional Tests     Color       Right Left    Ishihara 10/10 10/10          Stereo     Fly:  +    Animals:  3/3    Circles:  9/9            Slit Lamp and Fundus Exam     External Exam       Right Left    External Normal Normal          Slit Lamp Exam       Right Left    Lids/Lashes Normal Eyelash lodged in upper puncta    Conjunctiva/Sclera White and quiet White and quiet    Cornea Clear Clear    Anterior Chamber Deep and quiet Deep and quiet    Iris Round and reactive Round and reactive    Lens Clear Clear    Vitreous Normal Normal          Fundus Exam       Right Left    Disc Normal Normal    C/D Ratio 0.2 0.2    Macula Normal Normal    Vessels Normal Normal    Periphery PVD PVD            Refraction     Wearing Rx       Sphere Cylinder Axis Add    Right -0.75 +1.75 120 +1.50    Left -2.75 +1.75 087 +1.75    Age:  3m    Type:  Trifocal          Manifest Refraction (Auto)       Sphere Cylinder Axis    Right -1.50 +2.25 090    Left -2.25 +2.00 096                Pertinent Lab/Test/Imaging Review:      Assessment and Plan:     PVD (posterior vitreous detachment), bilateral  8/7/2019 -  Posterior vitreous detacmnets molst liklye leading to flashes and floaters. No retinal holes or tears, no peripheral retinal pigmentary changes. Also obtain OCT NFL thickness that was 79 OD and 79 OS with normal ganglion cell thickness, so no evidence of an optic neuropathy or retrograde axonal degeneration. Thus discussed the warning signs of retinal holes and tears. Recommended re-eval retina in 6 months or sooner if symptoms worsen.     Acute conjunctivitis of left eye  8/7/2019 - Acute segmental area of conjunctivitis left eye. Tried Alaway without improvement and then tobradex qid / taper. Conjunctiva white and quite today, but noticed a eyelash that was partially lodged in the upper puncta that was rubbing the nasal conjunctiva. Most likely this induced the conjuntivitis. Removed easily with an epilation forceps.         Daniel Tatum M.D.

## 2019-08-07 NOTE — ASSESSMENT & PLAN NOTE
8/7/2019 - Acute segmental area of conjunctivitis left eye. Tried Alaway without improvement and then tobradex qid / taper. Conjunctiva white and quite today, but noticed a eyelash that was partially lodged in the upper puncta that was rubbing the nasal conjunctiva. Most likely this induced the conjuntivitis. Removed easily with an epilation forceps.

## 2019-10-24 ENCOUNTER — NON-PROVIDER VISIT (OUTPATIENT)
Dept: INTERNAL MEDICINE | Facility: IMAGING CENTER | Age: 59
End: 2019-10-24
Payer: COMMERCIAL

## 2019-10-24 DIAGNOSIS — Z23 NEED FOR VACCINATION: ICD-10-CM

## 2019-10-24 PROCEDURE — 90471 IMMUNIZATION ADMIN: CPT | Performed by: FAMILY MEDICINE

## 2019-10-24 PROCEDURE — 90686 IIV4 VACC NO PRSV 0.5 ML IM: CPT | Performed by: FAMILY MEDICINE

## 2020-01-27 ENCOUNTER — HOSPITAL ENCOUNTER (OUTPATIENT)
Dept: RADIOLOGY | Facility: MEDICAL CENTER | Age: 60
End: 2020-01-27
Attending: FAMILY MEDICINE
Payer: COMMERCIAL

## 2020-01-27 DIAGNOSIS — E55.9 VITAMIN D DEFICIENCY: ICD-10-CM

## 2020-01-27 DIAGNOSIS — E78.2 MIXED HYPERLIPIDEMIA: ICD-10-CM

## 2020-01-27 DIAGNOSIS — Z12.31 VISIT FOR SCREENING MAMMOGRAM: ICD-10-CM

## 2020-01-27 DIAGNOSIS — Z00.00 HEALTH CARE MAINTENANCE: ICD-10-CM

## 2020-01-27 DIAGNOSIS — Z11.59 ENCOUNTER FOR HEPATITIS C SCREENING TEST FOR LOW RISK PATIENT: ICD-10-CM

## 2020-01-27 PROCEDURE — 77067 SCR MAMMO BI INCL CAD: CPT

## 2020-01-28 ENCOUNTER — HOSPITAL ENCOUNTER (OUTPATIENT)
Facility: MEDICAL CENTER | Age: 60
End: 2020-01-28
Attending: FAMILY MEDICINE
Payer: COMMERCIAL

## 2020-01-28 ENCOUNTER — NON-PROVIDER VISIT (OUTPATIENT)
Dept: INTERNAL MEDICINE | Facility: IMAGING CENTER | Age: 60
End: 2020-01-28
Payer: COMMERCIAL

## 2020-01-28 DIAGNOSIS — E78.2 MIXED HYPERLIPIDEMIA: ICD-10-CM

## 2020-01-28 DIAGNOSIS — Z00.00 HEALTH CARE MAINTENANCE: ICD-10-CM

## 2020-01-28 DIAGNOSIS — E55.9 VITAMIN D DEFICIENCY: ICD-10-CM

## 2020-01-28 DIAGNOSIS — Z11.59 ENCOUNTER FOR HEPATITIS C SCREENING TEST FOR LOW RISK PATIENT: ICD-10-CM

## 2020-01-28 LAB
25(OH)D3 SERPL-MCNC: 44 NG/ML (ref 30–100)
ALBUMIN SERPL BCP-MCNC: 5.2 G/DL (ref 3.2–4.9)
ALBUMIN/GLOB SERPL: 1.7 G/DL
ALP SERPL-CCNC: 58 U/L (ref 30–99)
ALT SERPL-CCNC: 20 U/L (ref 2–50)
ANION GAP SERPL CALC-SCNC: 9 MMOL/L (ref 0–11.9)
AST SERPL-CCNC: 25 U/L (ref 12–45)
BASOPHILS # BLD AUTO: 0.7 % (ref 0–1.8)
BASOPHILS # BLD: 0.03 K/UL (ref 0–0.12)
BILIRUB SERPL-MCNC: 0.7 MG/DL (ref 0.1–1.5)
BUN SERPL-MCNC: 12 MG/DL (ref 8–22)
CALCIUM SERPL-MCNC: 10.1 MG/DL (ref 8.5–10.5)
CHLORIDE SERPL-SCNC: 100 MMOL/L (ref 96–112)
CHOLEST SERPL-MCNC: 210 MG/DL (ref 100–199)
CO2 SERPL-SCNC: 28 MMOL/L (ref 20–33)
COMMENT 1642: NORMAL
CREAT SERPL-MCNC: 0.93 MG/DL (ref 0.5–1.4)
EOSINOPHIL # BLD AUTO: 0.08 K/UL (ref 0–0.51)
EOSINOPHIL NFR BLD: 1.9 % (ref 0–6.9)
ERYTHROCYTE [DISTWIDTH] IN BLOOD BY AUTOMATED COUNT: 41.8 FL (ref 35.9–50)
EST. AVERAGE GLUCOSE BLD GHB EST-MCNC: 114 MG/DL
GLOBULIN SER CALC-MCNC: 3.1 G/DL (ref 1.9–3.5)
GLUCOSE SERPL-MCNC: 79 MG/DL (ref 65–99)
HBA1C MFR BLD: 5.6 % (ref 0–5.6)
HCT VFR BLD AUTO: 47.6 % (ref 37–47)
HCV AB SER QL: NEGATIVE
HDLC SERPL-MCNC: 104 MG/DL
HGB BLD-MCNC: 15.3 G/DL (ref 12–16)
IMM GRANULOCYTES # BLD AUTO: 0.01 K/UL (ref 0–0.11)
IMM GRANULOCYTES NFR BLD AUTO: 0.2 % (ref 0–0.9)
LDLC SERPL CALC-MCNC: 96 MG/DL
LYMPHOCYTES # BLD AUTO: 1.53 K/UL (ref 1–4.8)
LYMPHOCYTES NFR BLD: 36.7 % (ref 22–41)
MCH RBC QN AUTO: 30.9 PG (ref 27–33)
MCHC RBC AUTO-ENTMCNC: 32.1 G/DL (ref 33.6–35)
MCV RBC AUTO: 96.2 FL (ref 81.4–97.8)
MONOCYTES # BLD AUTO: 0.23 K/UL (ref 0–0.85)
MONOCYTES NFR BLD AUTO: 5.5 % (ref 0–13.4)
MORPHOLOGY BLD-IMP: NORMAL
NEUTROPHILS # BLD AUTO: 2.29 K/UL (ref 2–7.15)
NEUTROPHILS NFR BLD: 55 % (ref 44–72)
NRBC # BLD AUTO: 0 K/UL
NRBC BLD-RTO: 0 /100 WBC
PLATELET # BLD AUTO: 155 K/UL (ref 164–446)
PMV BLD AUTO: 12.3 FL (ref 9–12.9)
POTASSIUM SERPL-SCNC: 3.8 MMOL/L (ref 3.6–5.5)
PROT SERPL-MCNC: 8.3 G/DL (ref 6–8.2)
RBC # BLD AUTO: 4.95 M/UL (ref 4.2–5.4)
SODIUM SERPL-SCNC: 137 MMOL/L (ref 135–145)
TRIGL SERPL-MCNC: 49 MG/DL (ref 0–149)
TSH SERPL DL<=0.005 MIU/L-ACNC: 1.63 UIU/ML (ref 0.38–5.33)
WBC # BLD AUTO: 4.2 K/UL (ref 4.8–10.8)

## 2020-01-28 PROCEDURE — 80061 LIPID PANEL: CPT

## 2020-01-28 PROCEDURE — 83036 HEMOGLOBIN GLYCOSYLATED A1C: CPT

## 2020-01-28 PROCEDURE — 85025 COMPLETE CBC W/AUTO DIFF WBC: CPT

## 2020-01-28 PROCEDURE — 84443 ASSAY THYROID STIM HORMONE: CPT

## 2020-01-28 PROCEDURE — 86803 HEPATITIS C AB TEST: CPT

## 2020-01-28 PROCEDURE — 80053 COMPREHEN METABOLIC PANEL: CPT

## 2020-01-28 PROCEDURE — 82306 VITAMIN D 25 HYDROXY: CPT

## 2020-01-31 ENCOUNTER — OFFICE VISIT (OUTPATIENT)
Dept: INTERNAL MEDICINE | Facility: IMAGING CENTER | Age: 60
End: 2020-01-31
Payer: COMMERCIAL

## 2020-01-31 VITALS
WEIGHT: 138 LBS | BODY MASS INDEX: 22.18 KG/M2 | HEART RATE: 73 BPM | HEIGHT: 66 IN | DIASTOLIC BLOOD PRESSURE: 63 MMHG | TEMPERATURE: 98.4 F | OXYGEN SATURATION: 98 % | SYSTOLIC BLOOD PRESSURE: 110 MMHG | RESPIRATION RATE: 17 BRPM

## 2020-01-31 DIAGNOSIS — E78.2 MIXED HYPERLIPIDEMIA: Chronic | ICD-10-CM

## 2020-01-31 DIAGNOSIS — E55.9 VITAMIN D DEFICIENCY: Chronic | ICD-10-CM

## 2020-01-31 DIAGNOSIS — M19.079 ARTHRITIS OF GREAT TOE AT METATARSOPHALANGEAL JOINT: Chronic | ICD-10-CM

## 2020-01-31 DIAGNOSIS — N28.1 RENAL CYST, LEFT: ICD-10-CM

## 2020-01-31 DIAGNOSIS — Z78.0 POSTMENOPAUSAL: Chronic | ICD-10-CM

## 2020-01-31 DIAGNOSIS — Z00.00 WELLNESS EXAMINATION: ICD-10-CM

## 2020-01-31 DIAGNOSIS — K90.0 CELIAC DISEASE: Chronic | ICD-10-CM

## 2020-01-31 DIAGNOSIS — R89.9 ABNORMAL LABORATORY TEST: ICD-10-CM

## 2020-01-31 PROBLEM — H10.32 ACUTE CONJUNCTIVITIS OF LEFT EYE: Status: RESOLVED | Noted: 2019-08-07 | Resolved: 2020-01-31

## 2020-01-31 PROCEDURE — 99396 PREV VISIT EST AGE 40-64: CPT | Performed by: FAMILY MEDICINE

## 2020-01-31 ASSESSMENT — PATIENT HEALTH QUESTIONNAIRE - PHQ9: CLINICAL INTERPRETATION OF PHQ2 SCORE: 0

## 2020-01-31 NOTE — PROGRESS NOTES
Chief Complaint   Patient presents with   • Annual Exam     Review lab results.       HPI:  Patient is a 59 y.o. female established patient who presents today for her annual wellness exam and to review labs done 1/28/2020. She denies any new health concerns and continues to exercise and travel frequently. She has chronic mixed dyslipidemia with nightly Atorvastatin use and has history of chronic migraines with two recent episodes earlier this month.  She feels that her symptoms were triggered by extensive travel, dehydration, and travel related fatigue. She also has a history of vitamin D deficiency with ongoing daily supplementation and has history of left renal cyst/ paternal family history of renal cell carcinoma. She also has intermittent arthritis pain of right great toe joint and uses Voltaren gel PRN for symptom control.  She endorses 100% medication compliance and is in a very happy mood today.     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     Medications and allergies reviewed and updated in Epic chart by me today.     Labs done 1/28/2020 reviewed with patient at visit today.     ROS:  Pertinent positives listed above in HPI. All other systems have been reviewed and are negative.    PE:   /63 (BP Location: Left arm, Patient Position: Sitting,  "BP Cuff Size: Adult)   Pulse 73   Temp 36.9 °C (98.4 °F) (Temporal)   Resp 17   Ht 1.676 m (5' 6\")   Wt 62.6 kg (138 lb)   LMP 08/25/2008   SpO2 98%   BMI 22.27 kg/m²   Vital signs reviewed with patient.     Gen: Well developed; well nourished; no acute distress; age appropriate appearance   HEENT: Normocephalic; atraumatic; PEERLA b/l; sclera clear b/l; b/l external auditory canals WNL; b/l TM WNL; nares patent; oropharynx clear; oral mucosa moist; tongue midline; dentition adequate   Neck: No adenopathy; no thyromegaly  CV: Regular rate and rhythm; S1/ S2 present; no murmur, gallop or rub noted  Pulm: No respiratory distress; clear to ascultation b/l; no wheezing or stridor noted b/l  Abd: Adequate bowel sounds noted; soft and nontender; no rebound, rigidity, nor distention  Extremities: No peripheral edema b/l LE extremities/ no clubbing nor cyanosis noted  Skin: Warm and dry; no rashes noted   Neuro: No focal deficits noted   Psych: AAOx4; mood and affect are appropriate    A/P:  1. Chronic migraine  Patient reports having two brief migraine episodes earlier this month with possible contributing factors of intense airline travel, dehydration, and fatigue. Patient will continue to monitor situation accordingly.     2. Celiac disease  Patient reports variable control with this condition and feels that her dairy intake may be a contributing factor. She is willing to live with the current soft bowel movements in order to continue regular cheese consumption.     3. Postmenopausal  Stable at this time    4. Mixed hyperlipidemia  Stable/ improved overall as compared to trend over the past five years. Patient is aware that total cholesterol is now 210 but recommend continuing nightly Atorvastatin use, continue to eat clean diet, maintain regular exercise habits, and will follow accordingly.     5. Vitamin D deficiency  Stable/ pt can continue current Vitamin D supplementation for maintenance.     6. Arthritis of " great 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  Stable per pt report/ uses PRN Voltaren gel for symptom control.     7. Renal cyst, left  US done 2/15/19 showed resolution of mid left kidney complex cyst and stability of simple cyst on left upper pole.     8. Abnormal laboratory test  Platelet count is now 155 and albumin/ total protein values are minimally elevated without known etiology. Recommend repeating labs in 4-8 weeks for recheck.   - CBC WITH DIFFERENTIAL; Future  - Comp Metabolic Panel; Future    9. Wellness examination  Patient is stable at this time and will be due for screening colonoscopy in July. She will let me know which provider she would like to see for this exam and is otherwise UTD with HCM items. She can defer GYN exam due in October until her next wellness visit with me, and she will call me as new needs arise. She will return for non fasting lab draw with Cecilia VEGA in the next 4-8 weeks for further care planning.

## 2020-02-07 DIAGNOSIS — Z12.11 ENCOUNTER FOR SCREENING COLONOSCOPY: ICD-10-CM

## 2020-02-10 ENCOUNTER — OFFICE VISIT (OUTPATIENT)
Dept: OPHTHALMOLOGY | Facility: MEDICAL CENTER | Age: 60
End: 2020-02-10
Payer: COMMERCIAL

## 2020-02-10 DIAGNOSIS — H43.813 PVD (POSTERIOR VITREOUS DETACHMENT), BILATERAL: ICD-10-CM

## 2020-02-10 PROCEDURE — 92014 COMPRE OPH EXAM EST PT 1/>: CPT | Performed by: OPHTHALMOLOGY

## 2020-02-10 ASSESSMENT — TONOMETRY
OS_IOP_MMHG: 20
IOP_METHOD: ICARE
OD_IOP_MMHG: 16

## 2020-02-10 ASSESSMENT — REFRACTION_WEARINGRX
SPECS_TYPE: PAL
OD_HPRISM: 0.25
OS_VBASE: UP
OS_AXIS: 085
OD_VPRISM: 1.75
OS_VPRISM: 2.5
OD_SPHERE: -2.00
OD_VBASE: UP
OD_CYLINDER: +2.00
OD_ADD: +2.50
OS_ADD: +2.75
OS_HBASE: OUT
OD_HBASE: IN
OS_HPRISM: 0.25
OD_AXIS: 089
OS_CYLINDER: +1.50
OS_SPHERE: -2.75

## 2020-02-10 ASSESSMENT — REFRACTION_MANIFEST
OD_SPHERE: -1.75
METHOD_AUTOREFRACTION: 1
OD_AXIS: 084
OS_SPHERE: -1.75
OS_CYLINDER: +1.25
OS_AXIS: 098
OD_CYLINDER: +2.25

## 2020-02-10 ASSESSMENT — ENCOUNTER SYMPTOMS
EYES NEGATIVE: 1
CONSTITUTIONAL NEGATIVE: 1
MUSCULOSKELETAL NEGATIVE: 1
RESPIRATORY NEGATIVE: 1
CARDIOVASCULAR NEGATIVE: 1
NEUROLOGICAL NEGATIVE: 1
PSYCHIATRIC NEGATIVE: 1
GASTROINTESTINAL NEGATIVE: 1

## 2020-02-10 ASSESSMENT — EXTERNAL EXAM - RIGHT EYE: OD_EXAM: NORMAL

## 2020-02-10 ASSESSMENT — PACHYMETRY
OS_CT(UM): 16
OD_CT(UM): 16

## 2020-02-10 ASSESSMENT — CONF VISUAL FIELD
OD_NORMAL: 1
OS_NORMAL: 1

## 2020-02-10 ASSESSMENT — VISUAL ACUITY
METHOD: SNELLEN - LINEAR
OS_CC: 20/20
OD_CC: 20/20
OD_CC+: -1
CORRECTION_TYPE: GLASSES

## 2020-02-10 ASSESSMENT — EXTERNAL EXAM - LEFT EYE: OS_EXAM: NORMAL

## 2020-02-10 ASSESSMENT — CUP TO DISC RATIO
OD_RATIO: 0.2
OS_RATIO: 0.2

## 2020-02-10 ASSESSMENT — SLIT LAMP EXAM - LIDS: COMMENTS: NORMAL

## 2020-02-10 NOTE — ASSESSMENT & PLAN NOTE
8/7/2019 - Posterior vitreous detacmnets molst mosquedalye leading to flashes and floaters. No retinal holes or tears, no peripheral retinal pigmentary changes. Also obtain OCT NFL thickness that was 79 OD and 79 OS with normal ganglion cell thickness, so no evidence of an optic neuropathy or retrograde axonal degeneration. Thus discussed the warning signs of retinal holes and tears. Recommended re-eval retina in 6 months or sooner if symptoms worsen.   2/10/2020 - Overall stable. Some minimal peripheral pigmentary changes, but I do not detect any tears of holes. Would like to re-eval in 1 years. Discussed that if symptoms worsen will refer to retina.

## 2020-02-10 NOTE — PROGRESS NOTES
Peds/Neuro Ophthalmology:   Daniel Tatum M.D.    Date & Time note created:    2/10/2020   11:00 AM     Referring MD / APRN:  Urszula Guzmán M.D., No att. providers found    Patient ID:  Name:             Lily Sawant   YOB: 1960  Age:                 59 y.o.  female   MRN:               [id number]    Chief Complaint/Reason for Visit:     Other (Flashes and floaters)      History of Present Illness:    PETER Sawant is a 59 y.o. female   58 y/o female [facility] for vitreous detachement. Pt states that she still sees flashes and floaters, and they have not gotten worse, but also has not noticed that they've gotten any better.  She states that they occur more frequently in the evening, but occasionally in the morning.  She also states that she has recently noticed her eyelids drooping a little more.  Pt states that she has not had any other issues.  Pt has family hx of melanoma, sister had melanoma on her arm, and brother had melanoma in his eye.        Review of Systems:  Review of Systems   Constitutional: Negative.    HENT: Negative.    Eyes: Negative.         Floaters and light flashes   Respiratory: Negative.    Cardiovascular: Negative.    Gastrointestinal: Negative.    Genitourinary: Negative.    Musculoskeletal: Negative.    Skin: Negative.    Neurological: Negative.    Endo/Heme/Allergies: Negative.    Psychiatric/Behavioral: Negative.        Past Medical History:   Past Medical History:   Diagnosis Date   • Celiac disease 2/13/2015   • Family history of ischemic heart disease (IHD)    • FH: renal cell carcinoma 2/13/2015   • Hyperlipidemia 2/13/2015   • Migraine headache 2/13/2015       Past Surgical History:  Past Surgical History:   Procedure Laterality Date   • DILATION AND EVACUATION  1985 & 1987   • TONSILLECTOMY         Current Outpatient Medications:  Current Outpatient Medications   Medication Sig Dispense Refill   • atorvastatin (LIPITOR) 20 MG Tab Take 1 Tab by  mouth every bedtime. 90 Tab 3   • Diclofenac Sodium 1 % Gel Apply small amount to right great toe joint four times per day as needed for arthritis pain. 1 Tube 3   • Multiple Vitamins-Minerals (WOMENS DAILY FORMULA PO) Take  by mouth.     • Cholecalciferol (VITAMIN D) 2000 units Cap Take 2,000 Units by mouth every day.     • aspirin EC (ECOTRIN) 81 MG Tablet Delayed Response Take 81 mg by mouth every day.       No current facility-administered medications for this visit.        Allergies:  No Known Allergies    Family History:  Family History   Problem Relation Age of Onset   • Arthritis Mother    • Glasses Mother    • Other Mother    • Cancer Sister         Melanoma   • Other Sister    • Cancer Father         Renal & Lung   • Lung Disease Father    • Glasses Father    • Cancer Brother         Melanoma - eye   • Other Brother    • Glaucoma Brother    • Glasses Child    • Other Child         SIDS   • Glasses Child    • Glasses Child    • Cancer Paternal Grandfather         Renal   • Heart Disease Maternal Grandmother    • Heart Attack Maternal Uncle 65        MI       Social History:  Social History     Socioeconomic History   • Marital status:      Spouse name: Not on file   • Number of children: Not on file   • Years of education: Not on file   • Highest education level: Not on file   Occupational History   • Not on file   Social Needs   • Financial resource strain: Not on file   • Food insecurity:     Worry: Not on file     Inability: Not on file   • Transportation needs:     Medical: Not on file     Non-medical: Not on file   Tobacco Use   • Smoking status: Former Smoker     Packs/day: 0.25     Years: 3.00     Pack years: 0.75     Types: Cigarettes     Last attempt to quit: 1978     Years since quittin.0   • Smokeless tobacco: Never Used   Substance and Sexual Activity   • Alcohol use: Yes     Alcohol/week: 7.0 oz     Types: 14 Glasses of wine per week   • Drug use: No   • Sexual activity: Yes      Partners: Male   Lifestyle   • Physical activity:     Days per week: Not on file     Minutes per session: Not on file   • Stress: Not on file   Relationships   • Social connections:     Talks on phone: Not on file     Gets together: Not on file     Attends Latter day service: Not on file     Active member of club or organization: Not on file     Attends meetings of clubs or organizations: Not on file     Relationship status: Not on file   • Intimate partner violence:     Fear of current or ex partner: Not on file     Emotionally abused: Not on file     Physically abused: Not on file     Forced sexual activity: Not on file   Other Topics Concern   • Not on file   Social History Narrative    60 y/o female works part time           Physical Exam:  Physical Exam    Oriented x 3  Weight/BMI: There is no height or weight on file to calculate BMI.  There were no vitals taken for this visit.    Base Eye Exam     Visual Acuity (Snellen - Linear)       Right Left    Dist cc 20/20 -1 20/20    Correction:  Glasses          Tonometry ([facility], 10:04 AM)       Right Left    Pressure 16 20          Pachymetry (2/10/2020)       Right Left    Thickness 16 16          Pupils       Pupils    Right PERRL    Left PERRL          Visual Fields       Right Left     Full Full          Extraocular Movement       Right Left     Full, Ortho Full, Ortho          Neuro/Psych     Oriented x3:  Yes    Mood/Affect:  Normal          Dilation     Both eyes:  Tropicamide (MYDRIACYL) 1% ophthalmic solution, Phenylephrine (NEOSYNEPHRINE) ophthalmic solution 2.5% @ 10:23 AM            Additional Tests     Color       Right Left    Ishihara 10/10 10/10          Stereo     Fly:  +    Animals:  3/3    Circles:  9/9            Slit Lamp and Fundus Exam     External Exam       Right Left    External Normal Normal          Slit Lamp Exam       Right Left    Lids/Lashes Normal Eyelash lodged in upper puncta    Conjunctiva/Sclera White and quiet White and quiet     Cornea Clear Clear    Anterior Chamber Deep and quiet Deep and quiet    Iris Round and reactive Round and reactive    Lens Clear Clear    Vitreous Normal Normal          Fundus Exam       Right Left    Disc Normal Normal    C/D Ratio 0.2 0.2    Macula Normal Normal    Vessels Normal Normal    Periphery PVD, Pigmentation PVD, Pigmentation            Refraction     Wearing Rx       Sphere Cylinder Axis Add Horz Prism Vert Prism    Right -2.00 +2.00 089 +2.50 0.25 in 1.75 up    Left -2.75 +1.50 085 +2.75 0.25 out 2.5 up    Age:  1yrs    Type:  PAL          Manifest Refraction (Auto)       Sphere Cylinder Axis    Right -1.75 +2.25 084    Left -1.75 +1.25 098                Pertinent Lab/Test/Imaging Review:      Assessment and Plan:     PVD (posterior vitreous detachment), bilateral  8/7/2019 - Posterior vitreous detacmnets molst liklye leading to flashes and floaters. No retinal holes or tears, no peripheral retinal pigmentary changes. Also obtain OCT NFL thickness that was 79 OD and 79 OS with normal ganglion cell thickness, so no evidence of an optic neuropathy or retrograde axonal degeneration. Thus discussed the warning signs of retinal holes and tears. Recommended re-eval retina in 6 months or sooner if symptoms worsen.   2/10/2020 - Overall stable. Some minimal peripheral pigmentary changes, but I do not detect any tears of holes. Would like to re-eval in 1 years. Discussed that if symptoms worsen will refer to retina.        Daniel Tatum M.D.

## 2020-07-22 ENCOUNTER — APPOINTMENT (RX ONLY)
Dept: URBAN - METROPOLITAN AREA CLINIC 35 | Facility: CLINIC | Age: 60
Setting detail: DERMATOLOGY
End: 2020-07-22

## 2020-07-22 DIAGNOSIS — Z71.89 OTHER SPECIFIED COUNSELING: ICD-10-CM

## 2020-07-22 DIAGNOSIS — L81.4 OTHER MELANIN HYPERPIGMENTATION: ICD-10-CM

## 2020-07-22 DIAGNOSIS — D22 MELANOCYTIC NEVI: ICD-10-CM

## 2020-07-22 DIAGNOSIS — L82.1 OTHER SEBORRHEIC KERATOSIS: ICD-10-CM

## 2020-07-22 PROBLEM — D22.5 MELANOCYTIC NEVI OF TRUNK: Status: ACTIVE | Noted: 2020-07-22

## 2020-07-22 PROCEDURE — ? COUNSELING

## 2020-07-22 PROCEDURE — 99213 OFFICE O/P EST LOW 20 MIN: CPT

## 2020-07-22 ASSESSMENT — LOCATION DETAILED DESCRIPTION DERM
LOCATION DETAILED: LEFT MEDIAL UPPER BACK
LOCATION DETAILED: INFERIOR THORACIC SPINE
LOCATION DETAILED: LEFT DISTAL DORSAL FOREARM
LOCATION DETAILED: RIGHT DISTAL PRETIBIAL REGION
LOCATION DETAILED: LEFT DISTAL PRETIBIAL REGION
LOCATION DETAILED: RIGHT PROXIMAL DORSAL FOREARM
LOCATION DETAILED: LEFT POSTERIOR SHOULDER
LOCATION DETAILED: EPIGASTRIC SKIN
LOCATION DETAILED: RIGHT POSTERIOR SHOULDER

## 2020-07-22 ASSESSMENT — LOCATION SIMPLE DESCRIPTION DERM
LOCATION SIMPLE: ABDOMEN
LOCATION SIMPLE: RIGHT PRETIBIAL REGION
LOCATION SIMPLE: UPPER BACK
LOCATION SIMPLE: RIGHT FOREARM
LOCATION SIMPLE: LEFT UPPER BACK
LOCATION SIMPLE: LEFT SHOULDER
LOCATION SIMPLE: LEFT PRETIBIAL REGION
LOCATION SIMPLE: LEFT FOREARM
LOCATION SIMPLE: RIGHT SHOULDER

## 2020-07-22 ASSESSMENT — LOCATION ZONE DERM
LOCATION ZONE: ARM
LOCATION ZONE: TRUNK
LOCATION ZONE: LEG

## 2020-10-15 ENCOUNTER — NON-PROVIDER VISIT (OUTPATIENT)
Dept: INTERNAL MEDICINE | Facility: IMAGING CENTER | Age: 60
End: 2020-10-15
Payer: COMMERCIAL

## 2020-10-15 DIAGNOSIS — Z23 NEED FOR INFLUENZA VACCINATION: ICD-10-CM

## 2020-10-15 PROCEDURE — 90471 IMMUNIZATION ADMIN: CPT | Performed by: FAMILY MEDICINE

## 2020-10-15 PROCEDURE — 90686 IIV4 VACC NO PRSV 0.5 ML IM: CPT | Performed by: FAMILY MEDICINE

## 2021-01-15 PROCEDURE — 0001A PFIZER SARS-COV-2 VACCINE: CPT | Performed by: INTERNAL MEDICINE

## 2021-01-15 PROCEDURE — 91300 PFIZER SARS-COV-2 VACCINE: CPT | Performed by: INTERNAL MEDICINE

## 2021-01-17 ENCOUNTER — IMMUNIZATION (OUTPATIENT)
Dept: FAMILY PLANNING/WOMEN'S HEALTH CLINIC | Facility: IMMUNIZATION CENTER | Age: 61
End: 2021-01-17
Payer: COMMERCIAL

## 2021-01-17 DIAGNOSIS — Z23 ENCOUNTER FOR VACCINATION: Primary | ICD-10-CM

## 2021-01-21 DIAGNOSIS — Z23 NEED FOR VACCINATION: ICD-10-CM

## 2021-01-30 DIAGNOSIS — E78.2 MIXED HYPERLIPIDEMIA: Chronic | ICD-10-CM

## 2021-02-01 RX ORDER — ATORVASTATIN CALCIUM 20 MG/1
TABLET, FILM COATED ORAL
Qty: 90 TAB | Refills: 0 | Status: SHIPPED | OUTPATIENT
Start: 2021-02-01 | End: 2021-08-19 | Stop reason: SDUPTHER

## 2021-02-05 ENCOUNTER — IMMUNIZATION (OUTPATIENT)
Dept: FAMILY PLANNING/WOMEN'S HEALTH CLINIC | Facility: IMMUNIZATION CENTER | Age: 61
End: 2021-02-05
Attending: INTERNAL MEDICINE
Payer: COMMERCIAL

## 2021-02-05 DIAGNOSIS — Z23 NEED FOR VACCINATION: ICD-10-CM

## 2021-02-05 DIAGNOSIS — Z23 ENCOUNTER FOR VACCINATION: Primary | ICD-10-CM

## 2021-02-05 PROCEDURE — 0002A PFIZER SARS-COV-2 VACCINE: CPT

## 2021-02-05 PROCEDURE — 91300 PFIZER SARS-COV-2 VACCINE: CPT

## 2021-02-08 ENCOUNTER — OFFICE VISIT (OUTPATIENT)
Dept: OPHTHALMOLOGY | Facility: MEDICAL CENTER | Age: 61
End: 2021-02-08
Payer: COMMERCIAL

## 2021-02-08 DIAGNOSIS — H25.13 AGE-RELATED NUCLEAR CATARACT OF BOTH EYES: ICD-10-CM

## 2021-02-08 DIAGNOSIS — H40.003 GLAUCOMA SUSPECT OF BOTH EYES: ICD-10-CM

## 2021-02-08 DIAGNOSIS — H02.834 DERMATOCHALASIS OF LEFT UPPER EYELID: ICD-10-CM

## 2021-02-08 DIAGNOSIS — H43.813 PVD (POSTERIOR VITREOUS DETACHMENT), BILATERAL: ICD-10-CM

## 2021-02-08 PROCEDURE — 92014 COMPRE OPH EXAM EST PT 1/>: CPT | Mod: 25 | Performed by: OPHTHALMOLOGY

## 2021-02-08 PROCEDURE — 92250 FUNDUS PHOTOGRAPHY W/I&R: CPT | Performed by: OPHTHALMOLOGY

## 2021-02-08 ASSESSMENT — EXTERNAL EXAM - RIGHT EYE: OD_EXAM: NORMAL

## 2021-02-08 ASSESSMENT — CUP TO DISC RATIO
OS_RATIO: 0.2
OD_RATIO: 0.2

## 2021-02-08 ASSESSMENT — REFRACTION_MANIFEST
OS_AXIS: 099
OS_CYLINDER: +1.75
OS_SPHERE: -2.25
OD_SPHERE: -1.75
OD_AXIS: 083
METHOD_AUTOREFRACTION: 1
OD_CYLINDER: +2.00

## 2021-02-08 ASSESSMENT — PACHYMETRY
OD_CT(UM): 16
OS_CT(UM): 16

## 2021-02-08 ASSESSMENT — VISUAL ACUITY
OS_CC: 20/25-1
OS_CC: J1+
METHOD: SNELLEN - LINEAR
OD_CC: J1+
OD_CC: 20/25+2
CORRECTION_TYPE: GLASSES

## 2021-02-08 ASSESSMENT — REFRACTION
OS_SPHERE: -2.00
OD_SPHERE: -1.75
OS_AXIS: 100
OS_CYLINDER: +1.75
OD_CYLINDER: +2.25
OD_AXIS: 085

## 2021-02-08 ASSESSMENT — REFRACTION_WEARINGRX
OS_SPHERE: -2.50
OD_ADD: +2.75
OD_SPHERE: -2.00
OS_AXIS: 087
OD_AXIS: 087
OS_ADD: +2.75
SPECS_TYPE: TRIFOCAL
OS_CYLINDER: +1.50
OD_CYLINDER: +2.00

## 2021-02-08 ASSESSMENT — SLIT LAMP EXAM - LIDS
COMMENTS: NORMAL
COMMENTS: PTOSIS, DERMATOCHALASIS - UPPER LID

## 2021-02-08 ASSESSMENT — TONOMETRY
OS_IOP_MMHG: 17
OD_IOP_MMHG: 18

## 2021-02-08 ASSESSMENT — CONF VISUAL FIELD
OS_NORMAL: 1
OD_NORMAL: 1

## 2021-02-08 ASSESSMENT — EXTERNAL EXAM - LEFT EYE: OS_EXAM: NORMAL

## 2021-02-08 NOTE — PROGRESS NOTES
Peds/Neuro Ophthalmology:   Daniel Tatum M.D.    Date & Time note created:    2/8/2021   3:31 PM     Referring MD / APRN:  Urszula Guzmán M.D., No att. providers found    Patient ID:  Name:             Lily Sawant   YOB: 1960  Age:                 60 y.o.  female   MRN:               [id number]    Chief Complaint/Reason for Visit:     Other (PVD/Optic neuropthy)      History of Present Illness:    PETER Sawant is a 60 y.o. female   Follow up for optic neuropath.Vision seemns pretty good but still ha some flashes and floaters but not as bad.No curtains over vision.No eye tearing or redness. Occasional flashes. Less than last visit. Mainly at night. Rarely in the morning      Review of Systems:  Review of Systems   Eyes:        Flashes and floaters   All other systems reviewed and are negative.      Past Medical History:   Past Medical History:   Diagnosis Date   • Celiac disease 2/13/2015   • Family history of ischemic heart disease (IHD)    • FH: renal cell carcinoma 2/13/2015   • Hyperlipidemia 2/13/2015   • Migraine headache 2/13/2015       Past Surgical History:  Past Surgical History:   Procedure Laterality Date   • DILATION AND EVACUATION  1985 & 1987   • TONSILLECTOMY         Current Outpatient Medications:  Current Outpatient Medications   Medication Sig Dispense Refill   • atorvastatin (LIPITOR) 20 MG Tab TAKE 1 TABLET BY MOUTH EVERYDAY AT BEDTIME 90 Tab 0   • Diclofenac Sodium 1 % Gel Apply small amount to right great toe joint four times per day as needed for arthritis pain. 1 Tube 3   • Multiple Vitamins-Minerals (WOMENS DAILY FORMULA PO) Take  by mouth.     • Cholecalciferol (VITAMIN D) 2000 units Cap Take 2,000 Units by mouth every day.     • aspirin EC (ECOTRIN) 81 MG Tablet Delayed Response Take 81 mg by mouth every day.       No current facility-administered medications for this visit.        Allergies:  No Known Allergies    Family History:  Family  History   Problem Relation Age of Onset   • Arthritis Mother    • Glasses Mother    • Other Mother    • Cancer Sister         Melanoma   • Other Sister    • Cancer Father         Renal & Lung   • Lung Disease Father    • Glasses Father    • Cancer Brother         Melanoma - eye   • Other Brother    • Glaucoma Brother    • Glasses Child    • Other Child         SIDS   • Glasses Child    • Glasses Child    • Cancer Paternal Grandfather         Renal   • Heart Disease Maternal Grandmother    • Heart Attack Maternal Uncle 65        MI       Social History:  Social History     Socioeconomic History   • Marital status:      Spouse name: Not on file   • Number of children: Not on file   • Years of education: Not on file   • Highest education level: Not on file   Occupational History   • Not on file   Social Needs   • Financial resource strain: Not on file   • Food insecurity     Worry: Not on file     Inability: Not on file   • Transportation needs     Medical: Not on file     Non-medical: Not on file   Tobacco Use   • Smoking status: Former Smoker     Packs/day: 0.25     Years: 3.00     Pack years: 0.75     Types: Cigarettes     Quit date: 1978     Years since quittin.0   • Smokeless tobacco: Never Used   Substance and Sexual Activity   • Alcohol use: Yes     Alcohol/week: 7.0 oz     Types: 14 Glasses of wine per week   • Drug use: No   • Sexual activity: Yes     Partners: Male   Lifestyle   • Physical activity     Days per week: Not on file     Minutes per session: Not on file   • Stress: Not on file   Relationships   • Social connections     Talks on phone: Not on file     Gets together: Not on file     Attends Advent service: Not on file     Active member of club or organization: Not on file     Attends meetings of clubs or organizations: Not on file     Relationship status: Not on file   • Intimate partner violence     Fear of current or ex partner: Not on file     Emotionally abused: Not on file      Physically abused: Not on file     Forced sexual activity: Not on file   Other Topics Concern   • Not on file   Social History Narrative    61 y/o female works part time           Physical Exam:  Physical Exam    Oriented x 3  Weight/BMI: There is no height or weight on file to calculate BMI.  There were no vitals taken for this visit.    Base Eye Exam     Visual Acuity (Snellen - Linear)       Right Left    Dist cc 20/25+2 20/25-1    Near cc J1+ J1+    Correction: Glasses          Tonometry (2:38 PM)       Right Left    Pressure 18 17          Pupils       Pupils    Right PERRL    Left PERRL          Visual Fields       Right Left     Full Full          Extraocular Movement       Right Left     Full, Ortho Full, Ortho          Neuro/Psych     Oriented x3: Yes    Mood/Affect: Normal          Dilation     Both eyes: Tropicamide (MYDRIACYL) 1% ophthalmic solution, Phenylephrine (NEOSYNEPHRINE) ophthalmic solution 2.5% @ 3:18 PM            Additional Tests     Color       Right Left    Ishihara 9/9 9/9          Stereo     Fly: +    Animals: 3/3    Circles: 9/9            Slit Lamp and Fundus Exam     External Exam       Right Left    External Normal Normal          Slit Lamp Exam       Right Left    Lids/Lashes Normal Ptosis, Dermatochalasis - upper lid    Conjunctiva/Sclera White and quiet White and quiet    Cornea Clear Clear    Anterior Chamber Deep and quiet Deep and quiet    Iris Round and reactive Round and reactive    Lens Nuclear sclerosis Nuclear sclerosis    Vitreous Normal Normal          Fundus Exam       Right Left    Disc Normal Normal    C/D Ratio 0.2 0.2    Macula Normal Normal    Vessels Normal Normal    Periphery PVD, Pigmentation PVD, Pigmentation            Refraction     Wearing Rx       Sphere Cylinder Axis Add    Right -2.00 +2.00 087 +2.75    Left -2.50 +1.50 087 +2.75    Age: 1yrs    Type: Trifocal          Manifest Refraction (Auto)       Sphere Cylinder Axis    Right -1.75 +2.00 083     Left -2.25 +1.75 099          Cycloplegic Refraction (Auto)       Sphere Cylinder Axis    Right -1.75 +2.25 085    Left -2.00 +1.75 100                Pertinent Lab/Test/Imaging Review:      Assessment and Plan:     PVD (posterior vitreous detachment), bilateral  8/7/2019 - Posterior vitreous detachments most likely leading to flashes and floaters. No retinal holes or tears, no peripheral retinal pigmentary changes. Also obtain OCT NFL thickness that was 79 OD and 79 OS with normal ganglion cell thickness, so no evidence of an optic neuropathy or retrograde axonal degeneration. Thus discussed the warning signs of retinal holes and tears. Recommended re-eval retina in 6 months or sooner if symptoms worsen.   2/10/2020 - Overall stable. Some minimal peripheral pigmentary changes, but I do not detect any tears of holes. Would like to re-eval in 1 years. Discussed that if symptoms worsen will refer to retina.  2/8/2021 - improved, no tears or holes    Dermatochalasis of left upper eyelid  2/8/2021 - mild left upper lid dermatochalasis leading to some ptosis. No obscuring visual axis.     Glaucoma suspect of both eyes  2/8/2021 - OCT NFL thickness normal at 80 OD and 79 OS, stable    Age-related nuclear cataract of both eyes  2/8/2021 - early ns cataracts. No visually signficant        Daniel Tatum M.D.

## 2021-02-08 NOTE — ASSESSMENT & PLAN NOTE
8/7/2019 - Posterior vitreous detachments most likely leading to flashes and floaters. No retinal holes or tears, no peripheral retinal pigmentary changes. Also obtain OCT NFL thickness that was 79 OD and 79 OS with normal ganglion cell thickness, so no evidence of an optic neuropathy or retrograde axonal degeneration. Thus discussed the warning signs of retinal holes and tears. Recommended re-eval retina in 6 months or sooner if symptoms worsen.   2/10/2020 - Overall stable. Some minimal peripheral pigmentary changes, but I do not detect any tears of holes. Would like to re-eval in 1 years. Discussed that if symptoms worsen will refer to retina.  2/8/2021 - improved, no tears or holes

## 2021-02-12 ENCOUNTER — HOSPITAL ENCOUNTER (OUTPATIENT)
Dept: RADIOLOGY | Facility: MEDICAL CENTER | Age: 61
End: 2021-02-12
Attending: FAMILY MEDICINE
Payer: COMMERCIAL

## 2021-02-12 DIAGNOSIS — Z12.31 VISIT FOR SCREENING MAMMOGRAM: ICD-10-CM

## 2021-02-12 PROCEDURE — 77063 BREAST TOMOSYNTHESIS BI: CPT

## 2021-02-22 ENCOUNTER — HOSPITAL ENCOUNTER (OUTPATIENT)
Facility: MEDICAL CENTER | Age: 61
End: 2021-02-22
Attending: PATHOLOGY
Payer: COMMERCIAL

## 2021-02-22 LAB
COVID ORDER STATUS COVID19: NORMAL
SARS-COV-2 RNA RESP QL NAA+PROBE: NOTDETECTED
SPECIMEN SOURCE: NORMAL

## 2021-02-22 PROCEDURE — U0003 INFECTIOUS AGENT DETECTION BY NUCLEIC ACID (DNA OR RNA); SEVERE ACUTE RESPIRATORY SYNDROME CORONAVIRUS 2 (SARS-COV-2) (CORONAVIRUS DISEASE [COVID-19]), AMPLIFIED PROBE TECHNIQUE, MAKING USE OF HIGH THROUGHPUT TECHNOLOGIES AS DESCRIBED BY CMS-2020-01-R: HCPCS

## 2021-02-22 PROCEDURE — U0005 INFEC AGEN DETEC AMPLI PROBE: HCPCS

## 2021-04-01 ENCOUNTER — HOSPITAL ENCOUNTER (OUTPATIENT)
Facility: MEDICAL CENTER | Age: 61
End: 2021-04-01
Attending: FAMILY MEDICINE
Payer: COMMERCIAL

## 2021-04-01 ENCOUNTER — OFFICE VISIT (OUTPATIENT)
Dept: INTERNAL MEDICINE | Facility: IMAGING CENTER | Age: 61
End: 2021-04-01
Payer: COMMERCIAL

## 2021-04-01 VITALS
SYSTOLIC BLOOD PRESSURE: 118 MMHG | HEART RATE: 71 BPM | DIASTOLIC BLOOD PRESSURE: 68 MMHG | RESPIRATION RATE: 14 BRPM | OXYGEN SATURATION: 97 % | HEIGHT: 66 IN | WEIGHT: 144 LBS | BODY MASS INDEX: 23.14 KG/M2 | TEMPERATURE: 98.2 F

## 2021-04-01 DIAGNOSIS — M79.671 RIGHT FOOT PAIN: ICD-10-CM

## 2021-04-01 DIAGNOSIS — R92.30 DENSE BREASTS: ICD-10-CM

## 2021-04-01 DIAGNOSIS — Z78.0 ASYMPTOMATIC POSTMENOPAUSAL STATUS: ICD-10-CM

## 2021-04-01 DIAGNOSIS — R92.2 DENSE BREASTS: ICD-10-CM

## 2021-04-01 DIAGNOSIS — I65.23 ATHEROSCLEROSIS OF BOTH CAROTID ARTERIES: ICD-10-CM

## 2021-04-01 DIAGNOSIS — Z00.00 ANNUAL PHYSICAL EXAM: ICD-10-CM

## 2021-04-01 DIAGNOSIS — Z12.4 ROUTINE PAPANICOLAOU SMEAR: ICD-10-CM

## 2021-04-01 DIAGNOSIS — Z76.89 ESTABLISHING CARE WITH NEW DOCTOR, ENCOUNTER FOR: ICD-10-CM

## 2021-04-01 DIAGNOSIS — Z80.51 FH: RENAL CELL CARCINOMA: ICD-10-CM

## 2021-04-01 PROCEDURE — 99396 PREV VISIT EST AGE 40-64: CPT | Performed by: FAMILY MEDICINE

## 2021-04-01 PROCEDURE — 88175 CYTOPATH C/V AUTO FLUID REDO: CPT

## 2021-04-01 SDOH — HEALTH STABILITY: PHYSICAL HEALTH: ON AVERAGE, HOW MANY MINUTES DO YOU ENGAGE IN EXERCISE AT THIS LEVEL?: 60 MIN

## 2021-04-01 SDOH — HEALTH STABILITY: PHYSICAL HEALTH: ON AVERAGE, HOW MANY DAYS PER WEEK DO YOU ENGAGE IN MODERATE TO STRENUOUS EXERCISE (LIKE A BRISK WALK)?: 7 DAYS

## 2021-04-01 ASSESSMENT — PATIENT HEALTH QUESTIONNAIRE - PHQ9: CLINICAL INTERPRETATION OF PHQ2 SCORE: 0

## 2021-04-01 ASSESSMENT — FIBROSIS 4 INDEX: FIB4 SCORE: 2.27

## 2021-04-01 NOTE — PROGRESS NOTES
Subjective:     CC:   Chief Complaint   Patient presents with   • Establish Care   • Gynecologic Exam   • Overdue Lab And Imaging Result Follow-up     kidney us, carotid us   • Oral Swelling       HPI:   Lily Sawant is a 61 y.o. female who presents for annual exam  She complains of arthritis pain in her right foot  She is using Voltaren gel  There is a family history of kidney cancer she is requesting ultrasound  Patient has GYN provider: No      H/O Abnormal Pap: No      OB History    Para Term  AB Living   6 4 0 0 2 0   SAB TAB Ectopic Molar Multiple Live Births   2 0 0 0 0 0      She  reports being sexually active and has had partner(s) who are Male.    She  has a past medical history of Celiac disease (2015), Family history of ischemic heart disease (IHD), FH: renal cell carcinoma (2015), Hyperlipidemia (2015), and Migraine headache (2015). She also has no past medical history of History of melanoma or Personal history of renal cancer.  She  has a past surgical history that includes tonsillectomy and dilation and evacuation ( & ).    Family History   Problem Relation Age of Onset   • Arthritis Mother    • Glasses Mother    • Other Mother    • Cancer Sister         Melanoma   • Other Sister    • Cancer Father         Renal & Lung   • Lung Disease Father    • Glasses Father    • Cancer Brother         Melanoma - eye   • Other Brother    • Glaucoma Brother    • Glasses Child    • Other Child         SIDS   • Glasses Child    • Glasses Child    • Cancer Paternal Grandfather         Renal   • Heart Disease Maternal Grandmother    • Heart Attack Maternal Uncle 65        MI     Social History     Tobacco Use   • Smoking status: Former Smoker     Packs/day: 0.25     Years: 3.00     Pack years: 0.75     Types: Cigarettes     Quit date: 1978     Years since quittin.1   • Smokeless tobacco: Never Used   Substance Use Topics   • Alcohol use: Yes     Alcohol/week:  7.0 oz     Types: 14 Glasses of wine per week     Comment: 7   • Drug use: No       Patient Active Problem List    Diagnosis Date Noted   • Dermatochalasis of left upper eyelid 02/08/2021   • Glaucoma suspect of both eyes 02/08/2021   • Age-related nuclear cataract of both eyes 02/08/2021   • PVD (posterior vitreous detachment), bilateral 08/07/2019   • 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 10/23/2017   • Vitamin D deficiency 11/14/2016   • Mixed hyperlipidemia 09/27/2016   • Family history of ischemic heart disease (IHD)    • Atherosclerosis of both carotid arteries, mild per 2016 Ultrasound 09/16/2016   • Postmenopausal 05/31/2016   • Renal cyst, left 02/16/2016   • Chronic migraine 02/13/2015   • Celiac disease 02/13/2015   • FH: renal cell carcinoma 02/13/2015   • Family history of malignant melanoma 02/13/2015     Current Outpatient Medications   Medication Sig Dispense Refill   • atorvastatin (LIPITOR) 20 MG Tab TAKE 1 TABLET BY MOUTH EVERYDAY AT BEDTIME 90 Tab 0   • Diclofenac Sodium 1 % Gel Apply small amount to right great toe joint four times per day as needed for arthritis pain. 1 Tube 3   • Multiple Vitamins-Minerals (WOMENS DAILY FORMULA PO) Take  by mouth.     • Cholecalciferol (VITAMIN D) 2000 units Cap Take 2,000 Units by mouth every day.     • aspirin EC (ECOTRIN) 81 MG Tablet Delayed Response Take 81 mg by mouth every day.       No current facility-administered medications for this visit.     No Known Allergies    Review of Systems  Constitutional: Negative for fever, chills and malaise/fatigue.   HENT: Negative for congestion.    Eyes: Negative for pain.   Respiratory: Negative for cough and shortness of breath.    Cardiovascular: Negative for chest pain and leg swelling.   Gastrointestinal: Negative for nausea, vomiting, abdominal pain and diarrhea.   Genitourinary: Negative for dysuria and hematuria.   Skin: Negative for rash.   Neurological: Negative for dizziness, focal weakness  "and headaches.   Endo/Heme/Allergies: Does not bruise/bleed easily.   Psychiatric/Behavioral: Negative for depression.  The patient is not nervous/anxious.      Objective:   /68   Pulse 71   Temp 36.8 °C (98.2 °F) (Temporal)   Resp 14   Ht 1.68 m (5' 6.14\")   Wt 65.3 kg (144 lb)   LMP 08/25/2008   SpO2 97%   BMI 23.14 kg/m²     Wt Readings from Last 4 Encounters:   04/01/21 65.3 kg (144 lb)   01/31/20 62.6 kg (138 lb)   01/28/19 62.6 kg (138 lb)   03/12/18 63.5 kg (140 lb)           Physical Exam:*  Constitutional: Well-developed and well-nourished. Not diaphoretic. No distress.   Skin: Skin is warm and dry. No rash noted.  Head: Atraumatic without lesions.  Eyes: Conjunctivae and extraocular motions are normal. Pupils are equal, round, and reactive to light. No scleral icterus.   Ears:  External ears unremarkable. Tympanic membranes clear and intact.  Nose: Nares patent. Septum midline. Turbinates without erythema nor edema. No discharge.   Mouth/Throat: Tongue normal. Oropharynx is clear and moist. Posterior pharynx without erythema or exudates.  Neck: Supple, trachea midline. Normal range of motion. No thyromegaly present. No lymphadenopathy--cervical or supraclavicular.  Cardiovascular: Regular rate and rhythm, S1 and S2 without murmur, rubs, or gallops.    Respiratory: Effort normal. Clear to auscultation throughout. No adventitious sounds.   Breast: Breasts examined seated and supine. No skin changes, peau d'orange or nipple retraction. No discharge. No axillary or supraclavicular adenopathy. No masses or nodularity palpable.  Abdomen: Soft, non tender, and without distention. Active bowel sounds in all four quadrants. No rebound, guarding, masses or HSM.  : Perineum and external genitalia normal without rash. Vagina with no discharge. Cervix without visible lesions or discharge. Bimanual exam without adnexal masses or cervical motion tenderness.  Extremities: No cyanosis, clubbing, erythema, " nor edema. Distal pulses intact and symmetric.  Right foot with prominent first MTP joint  Musculoskeletal: All major joints AROM full in all directions without pain.  Neurological: Alert and oriented x 3. Grossly non-focal. Strength and sensation grossly intact. DTRs 2+/3 and symmetric.   Psychiatric:  Behavior, mood, and affect are appropriate.    Assessment and Plan:     1. Annual physical exam    2. Routine Papanicolaou smear completed    3. Right foot pain-bunion and possible callus  - REFERRAL TO PODIATRY    4. Dense breasts  - US-SCREENING WHOLE BREAST BILATERAL (3D SCREENING); Future    5. Asymptomatic postmenopausal status  - DS-BONE DENSITY STUDY (DEXA); Future    6. FH: renal cell carcinoma  - US-RENAL; Future    7. Atherosclerosis of both carotid arteries, mild per 2016 Ultrasound  - US-CAROTID DOPPLER BILAT; Future    8. Establishing care with new doctor, encounter for       Follow-up: No follow-ups on file.

## 2021-04-02 LAB — CYTOLOGY REG CYTOL: NORMAL

## 2021-04-05 ENCOUNTER — APPOINTMENT (RX ONLY)
Dept: URBAN - METROPOLITAN AREA CLINIC 35 | Facility: CLINIC | Age: 61
Setting detail: DERMATOLOGY
End: 2021-04-05

## 2021-04-05 DIAGNOSIS — L82.1 OTHER SEBORRHEIC KERATOSIS: ICD-10-CM

## 2021-04-05 DIAGNOSIS — L84 CORNS AND CALLOSITIES: ICD-10-CM

## 2021-04-05 DIAGNOSIS — D22 MELANOCYTIC NEVI: ICD-10-CM

## 2021-04-05 DIAGNOSIS — L259 CONTACT DERMATITIS AND OTHER ECZEMA, UNSPECIFIED CAUSE: ICD-10-CM

## 2021-04-05 PROBLEM — D22.72 MELANOCYTIC NEVI OF LEFT LOWER LIMB, INCLUDING HIP: Status: ACTIVE | Noted: 2021-04-05

## 2021-04-05 PROBLEM — L23.9 ALLERGIC CONTACT DERMATITIS, UNSPECIFIED CAUSE: Status: ACTIVE | Noted: 2021-04-05

## 2021-04-05 PROCEDURE — ? PRESCRIPTION

## 2021-04-05 PROCEDURE — 11055 PARING/CUTG B9 HYPRKER LES 1: CPT

## 2021-04-05 PROCEDURE — 99213 OFFICE O/P EST LOW 20 MIN: CPT | Mod: 25

## 2021-04-05 PROCEDURE — ? PARING HYPERKERATOTIC LESION

## 2021-04-05 PROCEDURE — ? OBSERVATION AND MEASURE

## 2021-04-05 PROCEDURE — ? COUNSELING

## 2021-04-05 RX ORDER — FLUOCINONIDE 0.5 MG/G
THIN LAYER OINTMENT TOPICAL BID
Qty: 1 | Refills: 0 | Status: ERX | COMMUNITY
Start: 2021-04-05

## 2021-04-05 RX ADMIN — FLUOCINONIDE THIN LAYER: 0.5 OINTMENT TOPICAL at 00:00

## 2021-04-05 ASSESSMENT — LOCATION ZONE DERM
LOCATION ZONE: TOE
LOCATION ZONE: FACE
LOCATION ZONE: LIP

## 2021-04-05 ASSESSMENT — LOCATION SIMPLE DESCRIPTION DERM
LOCATION SIMPLE: LEFT 2ND TOE
LOCATION SIMPLE: RIGHT GREAT TOE
LOCATION SIMPLE: RIGHT LIP
LOCATION SIMPLE: RIGHT TEMPLE

## 2021-04-05 ASSESSMENT — LOCATION DETAILED DESCRIPTION DERM
LOCATION DETAILED: RIGHT SUPERIOR VERMILION LIP
LOCATION DETAILED: RIGHT CENTRAL TEMPLE
LOCATION DETAILED: RIGHT LATERAL GREAT TOE
LOCATION DETAILED: LEFT LATERAL 2ND TOE

## 2021-04-05 NOTE — PROCEDURE: PARING HYPERKERATOTIC LESION
Medical Necessity Information: LCD Guidelines vary from payer to payer. Please check with your payer's policy to determine medical necessity. Many payers require at least 1 Class A indication, 2 Class B indications or 1 Class B and 2 Class C to qualify for insurance payment.
Medical Necessity Clause: This procedure was medically necessary because the patient has pain in the location of the clavus.
Paring Method: curette

## 2021-04-28 ENCOUNTER — HOSPITAL ENCOUNTER (OUTPATIENT)
Dept: RADIOLOGY | Facility: MEDICAL CENTER | Age: 61
End: 2021-04-28
Attending: FAMILY MEDICINE
Payer: COMMERCIAL

## 2021-04-28 DIAGNOSIS — Z80.51 FH: RENAL CELL CARCINOMA: ICD-10-CM

## 2021-04-28 DIAGNOSIS — R92.2 DENSE BREASTS: ICD-10-CM

## 2021-04-28 DIAGNOSIS — I65.23 ATHEROSCLEROSIS OF BOTH CAROTID ARTERIES: ICD-10-CM

## 2021-04-28 DIAGNOSIS — R92.30 DENSE BREASTS: ICD-10-CM

## 2021-04-28 DIAGNOSIS — Z78.0 ASYMPTOMATIC POSTMENOPAUSAL STATUS: ICD-10-CM

## 2021-04-28 PROCEDURE — 76641 ULTRASOUND BREAST COMPLETE: CPT

## 2021-04-28 PROCEDURE — 76775 US EXAM ABDO BACK WALL LIM: CPT

## 2021-04-28 PROCEDURE — 93880 EXTRACRANIAL BILAT STUDY: CPT

## 2021-04-28 PROCEDURE — 77080 DXA BONE DENSITY AXIAL: CPT

## 2021-04-29 ENCOUNTER — HOSPITAL ENCOUNTER (OUTPATIENT)
Facility: MEDICAL CENTER | Age: 61
End: 2021-04-29
Attending: FAMILY MEDICINE
Payer: COMMERCIAL

## 2021-04-29 ENCOUNTER — NON-PROVIDER VISIT (OUTPATIENT)
Dept: INTERNAL MEDICINE | Facility: IMAGING CENTER | Age: 61
End: 2021-04-29
Payer: COMMERCIAL

## 2021-04-29 DIAGNOSIS — Z00.00 ANNUAL PHYSICAL EXAM: ICD-10-CM

## 2021-04-29 DIAGNOSIS — E04.2 MULTIPLE THYROID NODULES: ICD-10-CM

## 2021-04-29 LAB
ALBUMIN SERPL BCP-MCNC: 4.9 G/DL (ref 3.2–4.9)
ALBUMIN/GLOB SERPL: 1.8 G/DL
ALP SERPL-CCNC: 71 U/L (ref 30–99)
ALT SERPL-CCNC: 23 U/L (ref 2–50)
ANION GAP SERPL CALC-SCNC: 9 MMOL/L (ref 7–16)
AST SERPL-CCNC: 24 U/L (ref 12–45)
BASOPHILS # BLD AUTO: 0.7 % (ref 0–1.8)
BASOPHILS # BLD: 0.03 K/UL (ref 0–0.12)
BILIRUB SERPL-MCNC: 0.6 MG/DL (ref 0.1–1.5)
BUN SERPL-MCNC: 11 MG/DL (ref 8–22)
CALCIUM SERPL-MCNC: 9.4 MG/DL (ref 8.5–10.5)
CHLORIDE SERPL-SCNC: 99 MMOL/L (ref 96–112)
CHOLEST SERPL-MCNC: 201 MG/DL (ref 100–199)
CO2 SERPL-SCNC: 27 MMOL/L (ref 20–33)
CREAT SERPL-MCNC: 0.79 MG/DL (ref 0.5–1.4)
EOSINOPHIL # BLD AUTO: 0.12 K/UL (ref 0–0.51)
EOSINOPHIL NFR BLD: 2.6 % (ref 0–6.9)
ERYTHROCYTE [DISTWIDTH] IN BLOOD BY AUTOMATED COUNT: 42.2 FL (ref 35.9–50)
GLOBULIN SER CALC-MCNC: 2.7 G/DL (ref 1.9–3.5)
GLUCOSE SERPL-MCNC: 81 MG/DL (ref 65–99)
HCT VFR BLD AUTO: 45.6 % (ref 37–47)
HDLC SERPL-MCNC: 90 MG/DL
HGB BLD-MCNC: 14.8 G/DL (ref 12–16)
IMM GRANULOCYTES # BLD AUTO: 0.01 K/UL (ref 0–0.11)
IMM GRANULOCYTES NFR BLD AUTO: 0.2 % (ref 0–0.9)
LDLC SERPL CALC-MCNC: 99 MG/DL
LYMPHOCYTES # BLD AUTO: 1.64 K/UL (ref 1–4.8)
LYMPHOCYTES NFR BLD: 35.8 % (ref 22–41)
MCH RBC QN AUTO: 31.2 PG (ref 27–33)
MCHC RBC AUTO-ENTMCNC: 32.5 G/DL (ref 33.6–35)
MCV RBC AUTO: 96.2 FL (ref 81.4–97.8)
MONOCYTES # BLD AUTO: 0.32 K/UL (ref 0–0.85)
MONOCYTES NFR BLD AUTO: 7 % (ref 0–13.4)
NEUTROPHILS # BLD AUTO: 2.46 K/UL (ref 2–7.15)
NEUTROPHILS NFR BLD: 53.7 % (ref 44–72)
NRBC # BLD AUTO: 0 K/UL
NRBC BLD-RTO: 0 /100 WBC
PLATELET # BLD AUTO: 210 K/UL (ref 164–446)
PMV BLD AUTO: 11.7 FL (ref 9–12.9)
POTASSIUM SERPL-SCNC: 3.8 MMOL/L (ref 3.6–5.5)
PROT SERPL-MCNC: 7.6 G/DL (ref 6–8.2)
RBC # BLD AUTO: 4.74 M/UL (ref 4.2–5.4)
SODIUM SERPL-SCNC: 135 MMOL/L (ref 135–145)
TRIGL SERPL-MCNC: 62 MG/DL (ref 0–149)
WBC # BLD AUTO: 4.6 K/UL (ref 4.8–10.8)

## 2021-04-29 PROCEDURE — 80053 COMPREHEN METABOLIC PANEL: CPT

## 2021-04-29 PROCEDURE — 80061 LIPID PANEL: CPT

## 2021-04-29 PROCEDURE — 85025 COMPLETE CBC W/AUTO DIFF WBC: CPT

## 2021-04-29 PROCEDURE — 84443 ASSAY THYROID STIM HORMONE: CPT

## 2021-04-30 ENCOUNTER — OFFICE VISIT (OUTPATIENT)
Dept: ENDOCRINOLOGY | Facility: MEDICAL CENTER | Age: 61
End: 2021-04-30
Attending: INTERNAL MEDICINE
Payer: COMMERCIAL

## 2021-04-30 VITALS
WEIGHT: 142 LBS | BODY MASS INDEX: 22.82 KG/M2 | HEART RATE: 82 BPM | OXYGEN SATURATION: 98 % | SYSTOLIC BLOOD PRESSURE: 94 MMHG | HEIGHT: 66 IN | DIASTOLIC BLOOD PRESSURE: 60 MMHG

## 2021-04-30 DIAGNOSIS — E04.2 MULTIPLE THYROID NODULES: ICD-10-CM

## 2021-04-30 LAB — TSH SERPL DL<=0.005 MIU/L-ACNC: 2.02 UIU/ML (ref 0.38–5.33)

## 2021-04-30 PROCEDURE — 99211 OFF/OP EST MAY X REQ PHY/QHP: CPT | Performed by: INTERNAL MEDICINE

## 2021-04-30 PROCEDURE — 99204 OFFICE O/P NEW MOD 45 MIN: CPT | Performed by: INTERNAL MEDICINE

## 2021-04-30 RX ORDER — FLUOCINONIDE 0.5 MG/G
OINTMENT TOPICAL
COMMUNITY
Start: 2021-04-05 | End: 2021-04-30

## 2021-04-30 RX ORDER — COVID-19 MOLECULAR TEST ASSAY
KIT MISCELLANEOUS
COMMUNITY
Start: 2021-04-07 | End: 2021-04-30

## 2021-04-30 ASSESSMENT — FIBROSIS 4 INDEX: FIB4 SCORE: 1.5

## 2021-05-01 NOTE — PROGRESS NOTES
Chief Complaint: Consult requested by Linh Al M.D. for evaluation of Thyroid Nodules    HPI:     Lily Sawant is a 61 y.o. female with history of thyroid nodules that were incidentally discovered during routine carotid sonogram done on April 2021 for surveillance of carotid atherosclerosis.  She has not undergone formal thyroid imaging such as a sonogram.    She denies family history of thyroid cancer and denies a history of radiation exposure.  She denies anterior neck compressive symptoms such as dysphagia hoarseness and dyspnea    Her baseline TSH was normal 2.020 on April 29, 2021      Her other comorbid conditions include celiac disease hyperlipidemia renal cysts, and osteopenia with the lowest T score of -1.2 for the left hip with FRAX scores of 16.8% for 10-year risk of any major fracture and 1.1% for 10-year risk of any major hip fracture      Current medications include atorvastatin, vitamin D 2000 units daily, aspirin and women's vitamins      Informal thyroid ultrasound completed in the office today showed multiple cystic nodules on both lobes.  Specifically I detected a 1.0 cm hypoechoic cyst with internal septations on the right upper lobe and a 1.0 cm complex nodule with internal areas of cystic degeneration on the right lower lobe.    In addition there were multiple subcentimeter cystic nodules on the left lobe all of which appeared low risk or benign based on the presence of colloid artifacts.      I did not see any suspicious lateral neck nodes      Patient's medications, allergies, and social histories were reviewed and updated as appropriate.      ROS:      CONS:     No fever, no chills, no weight loss, no fatigue   EYES:      No diplopia, no blurry vision, no redness of eyes, no swelling of eyelids   ENT:    No hearing loss, No ear pain, No sore throat, no dysphagia, no neck swelling   CV:     No chest pain, no palpitations, no claudication, no orthopnea, no PND   PULM:    No  SOB, no cough, no hemoptysis, no wheezing    GI:   No nausea, no vomiting, no diarrhea, no constipation, no bloody stools   :  Passing urine well, no dysuria, no hematuria   ENDO:   No polyuria, no polydipsia, no heat intolerance, no cold intolerance   NEURO: No headaches, no dizziness, no convulsions, no tremors   MUSC:  No joint swellings, no arthralgias, no myalgias, no weakness   SKIN:   No rash, no ulcers, no dry skin   PSYCH:   No depression, no anxiety, no difficulty sleeping       Past Medical History:  Patient Active Problem List    Diagnosis Date Noted   • Multiple thyroid nodules 04/30/2021   • Dermatochalasis of left upper eyelid 02/08/2021   • Glaucoma suspect of both eyes 02/08/2021   • Age-related nuclear cataract of both eyes 02/08/2021   • PVD (posterior vitreous detachment), bilateral 08/07/2019   • 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 10/23/2017   • Vitamin D deficiency 11/14/2016   • Mixed hyperlipidemia 09/27/2016   • Family history of ischemic heart disease (IHD)    • Atherosclerosis of both carotid arteries, mild per 2016 Ultrasound 09/16/2016   • Postmenopausal 05/31/2016   • Renal cyst, left 02/16/2016   • Chronic migraine 02/13/2015   • Celiac disease 02/13/2015   • FH: renal cell carcinoma 02/13/2015   • Family history of malignant melanoma 02/13/2015       Past Surgical History:  Past Surgical History:   Procedure Laterality Date   • DILATION AND EVACUATION  1985 & 1987   • TONSILLECTOMY          Allergies:  Gluten meal and Pineapple     Current Medications:    Current Outpatient Medications:   •  atorvastatin (LIPITOR) 20 MG Tab, TAKE 1 TABLET BY MOUTH EVERYDAY AT BEDTIME, Disp: 90 Tab, Rfl: 0  •  Multiple Vitamins-Minerals (WOMENS DAILY FORMULA PO), Take  by mouth., Disp: , Rfl:   •  Cholecalciferol (VITAMIN D) 2000 units Cap, Take 2,000 Units by mouth every day., Disp: , Rfl:   •  aspirin EC (ECOTRIN) 81 MG Tablet Delayed Response, Take 81 mg by mouth every day., Disp:  , Rfl:     Social History:  Social History     Socioeconomic History   • Marital status:      Spouse name: Not on file   • Number of children: Not on file   • Years of education: Not on file   • Highest education level: Not on file   Occupational History   • Not on file   Tobacco Use   • Smoking status: Former Smoker     Packs/day: 0.25     Years: 3.00     Pack years: 0.75     Types: Cigarettes     Quit date: 1978     Years since quittin.2   • Smokeless tobacco: Never Used   Substance and Sexual Activity   • Alcohol use: Yes     Alcohol/week: 4.2 oz     Types: 7 Glasses of wine per week     Comment: 7   • Drug use: No   • Sexual activity: Yes     Partners: Male   Other Topics Concern   • Not on file   Social History Narrative    , has 3 kids     works part time      Social Determinants of Health     Financial Resource Strain:    • Difficulty of Paying Living Expenses:    Food Insecurity:    • Worried About Running Out of Food in the Last Year:    • Ran Out of Food in the Last Year:    Transportation Needs:    • Lack of Transportation (Medical):    • Lack of Transportation (Non-Medical):    Physical Activity: Sufficiently Active   • Days of Exercise per Week: 7 days   • Minutes of Exercise per Session: 60 min   Stress:    • Feeling of Stress :    Social Connections:    • Frequency of Communication with Friends and Family:    • Frequency of Social Gatherings with Friends and Family:    • Attends Mosque Services:    • Active Member of Clubs or Organizations:    • Attends Club or Organization Meetings:    • Marital Status:    Intimate Partner Violence:    • Fear of Current or Ex-Partner:    • Emotionally Abused:    • Physically Abused:    • Sexually Abused:         Family History:   Family History   Problem Relation Age of Onset   • Arthritis Mother    • Glasses Mother    • Other Mother    • Heart Disease Mother    • Cancer Sister         Melanoma   • Other Sister    • Cancer Father          "Renal & Lung   • Lung Disease Father    • Glasses Father    • Cancer Brother         Melanoma - eye   • Other Brother    • Glaucoma Brother    • Glasses Child    • Other Child         SIDS   • Glasses Child    • Glasses Child    • Cancer Paternal Grandfather         Renal   • Heart Disease Maternal Grandmother    • Heart Attack Maternal Uncle 65        MI         PHYSICAL EXAM:   Vital signs: BP (!) 94/60   Pulse 82   Ht 1.676 m (5' 6\")   Wt 64.4 kg (142 lb)   LMP 08/25/2008   SpO2 98%   BMI 22.92 kg/m²   GENERAL: Well-developed, well-nourished  in no apparent distress.   EYE: No ocular and eyelid asymmetry, Anicteric sclerae,  PERRL, No exophthalmos or lidlag  HENT: Hearing grossly intact, Normocephalic, atraumatic. Pink, moist mucous membranes, No exudate  NECK: Supple. Trachea midline. thyroid is normal in size without nodules or tenderness  CARDIOVASCULAR: Regular rate and rhythm. No murmurs, rubs, or gallops.   LUNGS: Clear to auscultation bilaterally   ABDOMEN: Soft, nontender with positive bowel sounds.   EXTREMITIES: No clubbing, cyanosis, or edema.   NEUROLOGICAL: Cranial nerves II-XII are grossly intact   Symmetric reflexes at the patella no proximal muscle weakness, No visible tremor with both outstretched hands  LYMPH: No cervical, supraclavicular,  adenopathy palpated.   SKIN: No rashes, lesions. Turgor is normal.    Labs:  Lab Results   Component Value Date/Time    WBC 4.6 (L) 04/29/2021 08:00 AM    RBC 4.74 04/29/2021 08:00 AM    HEMOGLOBIN 14.8 04/29/2021 08:00 AM    MCV 96.2 04/29/2021 08:00 AM    MCH 31.2 04/29/2021 08:00 AM    MCHC 32.5 (L) 04/29/2021 08:00 AM    RDW 42.2 04/29/2021 08:00 AM    MPV 11.7 04/29/2021 08:00 AM       Lab Results   Component Value Date/Time    SODIUM 135 04/29/2021 08:00 AM    POTASSIUM 3.8 04/29/2021 08:00 AM    CHLORIDE 99 04/29/2021 08:00 AM    CO2 27 04/29/2021 08:00 AM    ANION 9.0 04/29/2021 08:00 AM    GLUCOSE 81 04/29/2021 08:00 AM    BUN 11 04/29/2021 " 08:00 AM    CREATININE 0.79 04/29/2021 08:00 AM    CALCIUM 9.4 04/29/2021 08:00 AM    ASTSGOT 24 04/29/2021 08:00 AM    ALTSGPT 23 04/29/2021 08:00 AM    TBILIRUBIN 0.6 04/29/2021 08:00 AM    ALBUMIN 4.9 04/29/2021 08:00 AM    TOTPROTEIN 7.6 04/29/2021 08:00 AM    GLOBULIN 2.7 04/29/2021 08:00 AM    AGRATIO 1.8 04/29/2021 08:00 AM       Lab Results   Component Value Date/Time    TSHULTRASEN 2.020 04/29/2021 0800     No results found for: FREET4  No results found for: FREET3  No results found for: THYSTIMIG      Imaging:        ASSESSMENT/PLAN:     1. Multiple thyroid nodules  Stable  She is biochemically and clinically euthyroid and has no anterior neck compressive symptoms  Reviewed pathogenesis of thyroid nodules and the risk of growth and the risk of malignancy and the importance of continued surveillance    Informal ultrasound in the office showed multiple cystic nodules some of which are colloid cysts on the left lobe.  The largest nodules on the right lobe include a 1 cm cystic nodule on the right upper lobe and a 1 cm complex nodule with internal areas of cystic degeneration on the right lower lobe    To facilitate proper surveillance and monitoring I am going to schedule her for a formal ultrasound with radiology since the imaging I conducted today was informal and will not be officially dictated in a report but reported in my clinic notes and the patient is aware of this.    I will update her on the results of her formal ultrasound.    But based on the findings her nodules are low risk and biopsy is not indicated at this time.  We did discuss that thyroid hormone suppression of thyroid nodules to prevent thyroid nodule growth is not beneficial and may produce more harm than benefit especially because she has osteopenia      And finally we discussed that aspiration of her thyroid cyst is not indicated as it is not very large and she does not have any symptoms.  Finally ethanol ablation is also not indicated  because her cystic nodules are not very large and she is not symptomatic from them.    I recommend observation and plan to see her again in the office in 6 months with repeat of thyroid labs      Return in about 6 months (around 10/30/2021).      Thank you kindly for allowing me to participate in the thyroid care plan for this patient.    Bernabe Mann MD, [facility], [facility]  04/30/21    CC:   Linh Al M.D.

## 2021-05-11 DIAGNOSIS — M79.671 RIGHT FOOT PAIN: ICD-10-CM

## 2021-05-24 DIAGNOSIS — E55.9 VITAMIN D DEFICIENCY: ICD-10-CM

## 2021-05-26 ENCOUNTER — HOSPITAL ENCOUNTER (OUTPATIENT)
Dept: RADIOLOGY | Facility: MEDICAL CENTER | Age: 61
End: 2021-05-26
Attending: INTERNAL MEDICINE
Payer: COMMERCIAL

## 2021-05-26 DIAGNOSIS — E04.2 MULTIPLE THYROID NODULES: ICD-10-CM

## 2021-05-26 PROCEDURE — 76536 US EXAM OF HEAD AND NECK: CPT

## 2021-05-27 DIAGNOSIS — E04.2 MULTIPLE THYROID NODULES: ICD-10-CM

## 2021-06-01 ENCOUNTER — HOSPITAL ENCOUNTER (OUTPATIENT)
Dept: RADIOLOGY | Facility: MEDICAL CENTER | Age: 61
End: 2021-06-01
Attending: INTERNAL MEDICINE
Payer: COMMERCIAL

## 2021-06-01 PROCEDURE — 10005 FNA BX W/US GDN 1ST LES: CPT

## 2021-06-01 PROCEDURE — 88112 CYTOPATH CELL ENHANCE TECH: CPT

## 2021-06-01 NOTE — PROGRESS NOTES
[facility] Nursing Note:    Procedure RN: Love  US: Tristen    US guided right thyroid nodule fine needle aspiration done by Dr. Garcia; right anterior aspect of neck access site; 1 jar of cytolyt and 1 of Afirma obtained and sent to pathology lab; pt tolerated the procedure well; NO sedation NO H&P required; all questions and concerns answered prior to being d/c; patient provided with appropriate education for procedure; pt d/c home. Patient ambulated out of [facility] with a steady gait and all personal belongings.

## 2021-06-02 LAB — CYTOLOGY REG CYTOL: NORMAL

## 2021-06-22 ENCOUNTER — HOSPITAL ENCOUNTER (OUTPATIENT)
Facility: MEDICAL CENTER | Age: 61
End: 2021-06-22
Attending: PATHOLOGY
Payer: COMMERCIAL

## 2021-06-22 PROCEDURE — U0003 INFECTIOUS AGENT DETECTION BY NUCLEIC ACID (DNA OR RNA); SEVERE ACUTE RESPIRATORY SYNDROME CORONAVIRUS 2 (SARS-COV-2) (CORONAVIRUS DISEASE [COVID-19]), AMPLIFIED PROBE TECHNIQUE, MAKING USE OF HIGH THROUGHPUT TECHNOLOGIES AS DESCRIBED BY CMS-2020-01-R: HCPCS

## 2021-06-22 PROCEDURE — U0005 INFEC AGEN DETEC AMPLI PROBE: HCPCS

## 2021-07-29 ENCOUNTER — APPOINTMENT (RX ONLY)
Dept: URBAN - METROPOLITAN AREA CLINIC 35 | Facility: CLINIC | Age: 61
Setting detail: DERMATOLOGY
End: 2021-07-29

## 2021-07-29 DIAGNOSIS — Z71.89 OTHER SPECIFIED COUNSELING: ICD-10-CM

## 2021-07-29 DIAGNOSIS — D22 MELANOCYTIC NEVI: ICD-10-CM

## 2021-07-29 DIAGNOSIS — L82.1 OTHER SEBORRHEIC KERATOSIS: ICD-10-CM

## 2021-07-29 DIAGNOSIS — L81.4 OTHER MELANIN HYPERPIGMENTATION: ICD-10-CM

## 2021-07-29 PROBLEM — D22.5 MELANOCYTIC NEVI OF TRUNK: Status: ACTIVE | Noted: 2021-07-29

## 2021-07-29 PROBLEM — D22.72 MELANOCYTIC NEVI OF LEFT LOWER LIMB, INCLUDING HIP: Status: ACTIVE | Noted: 2021-07-29

## 2021-07-29 PROCEDURE — ? COUNSELING

## 2021-07-29 PROCEDURE — ? OBSERVATION AND MEASURE

## 2021-07-29 PROCEDURE — 99213 OFFICE O/P EST LOW 20 MIN: CPT

## 2021-07-29 ASSESSMENT — LOCATION DETAILED DESCRIPTION DERM
LOCATION DETAILED: LEFT LATERAL 2ND TOE
LOCATION DETAILED: RIGHT POSTERIOR SHOULDER
LOCATION DETAILED: RIGHT PROXIMAL DORSAL FOREARM
LOCATION DETAILED: LEFT DISTAL DORSAL FOREARM
LOCATION DETAILED: RIGHT DISTAL PRETIBIAL REGION
LOCATION DETAILED: INFERIOR THORACIC SPINE
LOCATION DETAILED: LEFT MEDIAL UPPER BACK
LOCATION DETAILED: LEFT DISTAL PRETIBIAL REGION
LOCATION DETAILED: EPIGASTRIC SKIN
LOCATION DETAILED: LEFT POSTERIOR SHOULDER

## 2021-07-29 ASSESSMENT — LOCATION SIMPLE DESCRIPTION DERM
LOCATION SIMPLE: RIGHT PRETIBIAL REGION
LOCATION SIMPLE: LEFT 2ND TOE
LOCATION SIMPLE: LEFT UPPER BACK
LOCATION SIMPLE: ABDOMEN
LOCATION SIMPLE: LEFT SHOULDER
LOCATION SIMPLE: LEFT FOREARM
LOCATION SIMPLE: RIGHT FOREARM
LOCATION SIMPLE: LEFT PRETIBIAL REGION
LOCATION SIMPLE: RIGHT SHOULDER
LOCATION SIMPLE: UPPER BACK

## 2021-07-29 ASSESSMENT — LOCATION ZONE DERM
LOCATION ZONE: LEG
LOCATION ZONE: ARM
LOCATION ZONE: TOE
LOCATION ZONE: TRUNK

## 2021-08-06 ENCOUNTER — HOSPITAL ENCOUNTER (OUTPATIENT)
Facility: MEDICAL CENTER | Age: 61
End: 2021-08-06
Attending: PATHOLOGY
Payer: COMMERCIAL

## 2021-08-06 PROCEDURE — U0003 INFECTIOUS AGENT DETECTION BY NUCLEIC ACID (DNA OR RNA); SEVERE ACUTE RESPIRATORY SYNDROME CORONAVIRUS 2 (SARS-COV-2) (CORONAVIRUS DISEASE [COVID-19]), AMPLIFIED PROBE TECHNIQUE, MAKING USE OF HIGH THROUGHPUT TECHNOLOGIES AS DESCRIBED BY CMS-2020-01-R: HCPCS

## 2021-08-06 PROCEDURE — U0005 INFEC AGEN DETEC AMPLI PROBE: HCPCS

## 2021-08-19 ENCOUNTER — PATIENT MESSAGE (OUTPATIENT)
Dept: INTERNAL MEDICINE | Facility: IMAGING CENTER | Age: 61
End: 2021-08-19

## 2021-08-19 DIAGNOSIS — E78.2 MIXED HYPERLIPIDEMIA: Chronic | ICD-10-CM

## 2021-08-19 RX ORDER — ATORVASTATIN CALCIUM 20 MG/1
TABLET, FILM COATED ORAL
Qty: 90 TABLET | Refills: 3 | Status: SHIPPED | OUTPATIENT
Start: 2021-08-19 | End: 2021-11-03 | Stop reason: SDUPTHER

## 2021-08-19 NOTE — TELEPHONE ENCOUNTER
From: Lily Lugo Coppes  To: Nurse Alma LOMBARDO  Sent: 8/19/2021 10:00 AM PDT  Subject: Refill    Could I ask for a refill on atorvastatin please.

## 2021-09-05 ENCOUNTER — HOSPITAL ENCOUNTER (OUTPATIENT)
Facility: MEDICAL CENTER | Age: 61
End: 2021-09-05
Attending: PATHOLOGY
Payer: COMMERCIAL

## 2021-09-05 PROCEDURE — U0005 INFEC AGEN DETEC AMPLI PROBE: HCPCS

## 2021-09-05 PROCEDURE — U0003 INFECTIOUS AGENT DETECTION BY NUCLEIC ACID (DNA OR RNA); SEVERE ACUTE RESPIRATORY SYNDROME CORONAVIRUS 2 (SARS-COV-2) (CORONAVIRUS DISEASE [COVID-19]), AMPLIFIED PROBE TECHNIQUE, MAKING USE OF HIGH THROUGHPUT TECHNOLOGIES AS DESCRIBED BY CMS-2020-01-R: HCPCS

## 2021-10-19 ENCOUNTER — NON-PROVIDER VISIT (OUTPATIENT)
Dept: INTERNAL MEDICINE | Facility: IMAGING CENTER | Age: 61
End: 2021-10-19
Payer: COMMERCIAL

## 2021-10-19 DIAGNOSIS — Z23 NEED FOR INFLUENZA VACCINATION: ICD-10-CM

## 2021-10-19 PROCEDURE — 90686 IIV4 VACC NO PRSV 0.5 ML IM: CPT | Performed by: FAMILY MEDICINE

## 2021-10-19 PROCEDURE — 90471 IMMUNIZATION ADMIN: CPT | Performed by: FAMILY MEDICINE

## 2021-11-02 ENCOUNTER — HOSPITAL ENCOUNTER (OUTPATIENT)
Facility: MEDICAL CENTER | Age: 61
End: 2021-11-02
Attending: FAMILY MEDICINE
Payer: COMMERCIAL

## 2021-11-02 ENCOUNTER — HOSPITAL ENCOUNTER (OUTPATIENT)
Facility: MEDICAL CENTER | Age: 61
End: 2021-11-02
Attending: INTERNAL MEDICINE
Payer: COMMERCIAL

## 2021-11-02 ENCOUNTER — NON-PROVIDER VISIT (OUTPATIENT)
Dept: INTERNAL MEDICINE | Facility: IMAGING CENTER | Age: 61
End: 2021-11-02
Payer: COMMERCIAL

## 2021-11-02 DIAGNOSIS — E55.9 VITAMIN D DEFICIENCY: ICD-10-CM

## 2021-11-02 DIAGNOSIS — E04.2 MULTIPLE THYROID NODULES: ICD-10-CM

## 2021-11-02 LAB
25(OH)D3 SERPL-MCNC: 67 NG/ML (ref 30–100)
ALBUMIN SERPL BCP-MCNC: 4.6 G/DL (ref 3.2–4.9)
ALBUMIN/GLOB SERPL: 1.9 G/DL
ALP SERPL-CCNC: 56 U/L (ref 30–99)
ALT SERPL-CCNC: 22 U/L (ref 2–50)
ANION GAP SERPL CALC-SCNC: 10 MMOL/L (ref 7–16)
AST SERPL-CCNC: 23 U/L (ref 12–45)
BILIRUB SERPL-MCNC: 0.4 MG/DL (ref 0.1–1.5)
BUN SERPL-MCNC: 15 MG/DL (ref 8–22)
CALCIUM SERPL-MCNC: 9.8 MG/DL (ref 8.5–10.5)
CHLORIDE SERPL-SCNC: 102 MMOL/L (ref 96–112)
CO2 SERPL-SCNC: 27 MMOL/L (ref 20–33)
CREAT SERPL-MCNC: 0.7 MG/DL (ref 0.5–1.4)
GLOBULIN SER CALC-MCNC: 2.4 G/DL (ref 1.9–3.5)
GLUCOSE SERPL-MCNC: 139 MG/DL (ref 65–99)
POTASSIUM SERPL-SCNC: 3.9 MMOL/L (ref 3.6–5.5)
PROT SERPL-MCNC: 7 G/DL (ref 6–8.2)
SODIUM SERPL-SCNC: 139 MMOL/L (ref 135–145)
T4 FREE SERPL-MCNC: 1.27 NG/DL (ref 0.93–1.7)
TSH SERPL DL<=0.005 MIU/L-ACNC: 2.42 UIU/ML (ref 0.38–5.33)

## 2021-11-02 PROCEDURE — 80053 COMPREHEN METABOLIC PANEL: CPT

## 2021-11-02 PROCEDURE — 84443 ASSAY THYROID STIM HORMONE: CPT

## 2021-11-02 PROCEDURE — 82308 ASSAY OF CALCITONIN: CPT

## 2021-11-02 PROCEDURE — 84439 ASSAY OF FREE THYROXINE: CPT

## 2021-11-02 PROCEDURE — 82306 VITAMIN D 25 HYDROXY: CPT

## 2021-11-03 ENCOUNTER — OFFICE VISIT (OUTPATIENT)
Dept: ENDOCRINOLOGY | Facility: MEDICAL CENTER | Age: 61
End: 2021-11-03
Attending: INTERNAL MEDICINE
Payer: COMMERCIAL

## 2021-11-03 VITALS
SYSTOLIC BLOOD PRESSURE: 98 MMHG | DIASTOLIC BLOOD PRESSURE: 60 MMHG | HEIGHT: 66 IN | BODY MASS INDEX: 23.21 KG/M2 | WEIGHT: 144.4 LBS | OXYGEN SATURATION: 98 % | RESPIRATION RATE: 16 BRPM | HEART RATE: 75 BPM

## 2021-11-03 DIAGNOSIS — E04.2 MULTIPLE THYROID NODULES: ICD-10-CM

## 2021-11-03 DIAGNOSIS — E55.9 VITAMIN D DEFICIENCY: ICD-10-CM

## 2021-11-03 DIAGNOSIS — M85.80 OSTEOPENIA, UNSPECIFIED LOCATION: ICD-10-CM

## 2021-11-03 DIAGNOSIS — E78.2 MIXED HYPERLIPIDEMIA: Chronic | ICD-10-CM

## 2021-11-03 PROCEDURE — 99211 OFF/OP EST MAY X REQ PHY/QHP: CPT | Performed by: INTERNAL MEDICINE

## 2021-11-03 PROCEDURE — 99214 OFFICE O/P EST MOD 30 MIN: CPT | Performed by: INTERNAL MEDICINE

## 2021-11-03 RX ORDER — ATORVASTATIN CALCIUM 20 MG/1
TABLET, FILM COATED ORAL
Qty: 90 TABLET | Refills: 3 | Status: SHIPPED | OUTPATIENT
Start: 2021-11-03 | End: 2023-01-19

## 2021-11-03 ASSESSMENT — FIBROSIS 4 INDEX: FIB4 SCORE: 1.47

## 2021-11-03 NOTE — PROGRESS NOTES
Chief Complaint: Follow up of Thyroid Nodules    HPI:     Lily Sawant is a 61 y.o. female with history of Thyroid Nodules is here for follow up evaluation.     Her comorbid issues include celiac disease, hyperlipidemia, renal cyst and osteopenia with low fracture risk based on FRAX    She was incidentally found to have thyroid nodules on carotid ultrasound done early this year.  She denies a family history of thyroid cancer and denies radiation exposure.    Formal ultrasound at [facility] on May 2021 showed a dominant 2 cm isoechoic solid nodule on the right lower lobe which was biopsied and proven benign on June 1, 2021    She also had a complex cysts with internal septations measuring 1.3 cm located on the right upper lobe which was low risk and biopsy was not indicated        On follow-up she reports that she is doing well and she denies interval falls and fractures  She denies difficulty swallowing and hoarseness    Her most recent TSH is normal 2.4 with a free T4 of 1.2 vitamin D was 67 on November 2021      Thank you    Patient's medications, allergies, and social histories were reviewed and updated as appropriate.      ROS:      CONS:     No fever, no chills   EYES:     No diplopia, no blurry vision   CV:           No chest pain, no palpitations   PULM:     No SOB, no cough, no hemoptysis.   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  2016-11: Elevated Lp(a)  2016-11: Vitamin D deficiency  2016-09: Mixed hyperlipidemia  2016-09:  "Atherosclerosis of both carotid arteries, mild per 2016   Ultrasound  2016: Postmenopausal  2016: Renal cyst, left  2016: Right foot pain  2016: Onychomycosis of left great toe  2015: Right shoulder pain  2015: Chronic migraine  2015: Depression  2015: Celiac disease  2015: FH: renal cell carcinoma  2015: Family history of malignant melanoma  2015: Hyperlipidemia  Family history of ischemic heart disease (IHD)      Past Surgical History:  Past Surgical History:   Procedure Laterality Date   • DILATION AND EVACUATION   &    • TONSILLECTOMY          Allergies:  Gluten meal and Pineapple     Social History:  Social History     Tobacco Use   • Smoking status: Former Smoker     Packs/day: 0.25     Years: 3.00     Pack years: 0.75     Types: Cigarettes     Quit date: 1978     Years since quittin.7   • Smokeless tobacco: Never Used   Vaping Use   • Vaping Use: Never used   Substance Use Topics   • Alcohol use: Yes     Alcohol/week: 4.2 oz     Types: 7 Glasses of wine per week     Comment: 7   • Drug use: No        Family History:   family history includes Arthritis in her mother; Cancer in her brother, father, paternal grandfather, and sister; Glasses in her child, child, child, father, and mother; Glaucoma in her brother; Heart Attack (age of onset: 65) in her maternal uncle; Heart Disease in her maternal grandmother and mother; Lung Disease in her father; Other in her brother, child, mother, and sister.      PHYSICAL EXAM:   Vital signs: BP (!) 98/60 (BP Location: Left arm, Patient Position: Sitting, BP Cuff Size: Adult)   Pulse 75   Resp 16   Ht 1.676 m (5' 6\")   Wt 65.5 kg (144 lb 6.4 oz)   LMP 2008   SpO2 98%   BMI 23.31 kg/m²   GENERAL: Well-developed, well-nourished in no apparent distress.   EYE:  No ocular asymmetry, PERRLA  HENT: Pink, moist mucous membranes.    NECK: No thyromegaly.   CARDIOVASCULAR:  No murmurs  LUNGS: Clear breath sounds  ABDOMEN: " Soft, nontender   EXTREMITIES: No clubbing, cyanosis, or edema.   NEUROLOGICAL: No gross focal motor abnormalities   LYMPH: No cervical adenopathy palpated.   SKIN: No rashes, lesions.       Labs:  Lab Results   Component Value Date/Time    WBC 4.6 (L) 04/29/2021 08:00 AM    RBC 4.74 04/29/2021 08:00 AM    HEMOGLOBIN 14.8 04/29/2021 08:00 AM    MCV 96.2 04/29/2021 08:00 AM    MCH 31.2 04/29/2021 08:00 AM    MCHC 32.5 (L) 04/29/2021 08:00 AM    RDW 42.2 04/29/2021 08:00 AM    MPV 11.7 04/29/2021 08:00 AM       Lab Results   Component Value Date/Time    SODIUM 139 11/02/2021 08:00 AM    POTASSIUM 3.9 11/02/2021 08:00 AM    CHLORIDE 102 11/02/2021 08:00 AM    CO2 27 11/02/2021 08:00 AM    ANION 10.0 11/02/2021 08:00 AM    GLUCOSE 139 (H) 11/02/2021 08:00 AM    BUN 15 11/02/2021 08:00 AM    CREATININE 0.70 11/02/2021 08:00 AM    CALCIUM 9.8 11/02/2021 08:00 AM    ASTSGOT 23 11/02/2021 08:00 AM    ALTSGPT 22 11/02/2021 08:00 AM    TBILIRUBIN 0.4 11/02/2021 08:00 AM    ALBUMIN 4.6 11/02/2021 08:00 AM    TOTPROTEIN 7.0 11/02/2021 08:00 AM    GLOBULIN 2.4 11/02/2021 08:00 AM    AGRATIO 1.9 11/02/2021 08:00 AM       Lab Results   Component Value Date/Time    TSHULTRASEN 2.420 11/02/2021 0800     Lab Results   Component Value Date/Time    FREET4 1.27 11/02/2021 0800     No results found for: FREET3  No results found for: THYSTIMIG      Imaging:      ASSESSMENT/PLAN:     1. Multiple thyroid nodules  Stable low risk nodules  The most suspicious nodule in the right lower lobe was biopsied and proven benign  The right upper lobe nodule is low risk based on ultrasound features  She is euthyroid and has no compressive symptoms  Recommend observation  I will see her again in 6 months with repeat ultrasound in the office    2. Vitamin D deficiency  Controlled continue vitamin D supplementation   repeat labs in 6 months      3. Osteopenia, unspecified location  Stable without fracture  Continue calcium vitamin D  supplementation  Continue regular weightbearing exercise  Repeat bone density in 2 years or on April 28, 2023      Return in about 6 months (around 5/3/2022).      Thank you kindly for allowing me to participate in the thyroid care plan for this patient.    Bernabe Mann MD, [facility], [facility]  11/03/21    CC:   Linh Al M.D.

## 2021-11-05 LAB — CALCIT SERPL-MCNC: <2 PG/ML (ref 0–5.1)

## 2022-02-15 ENCOUNTER — HOSPITAL ENCOUNTER (OUTPATIENT)
Dept: RADIOLOGY | Facility: MEDICAL CENTER | Age: 62
End: 2022-02-15
Attending: FAMILY MEDICINE
Payer: COMMERCIAL

## 2022-02-15 DIAGNOSIS — Z12.31 VISIT FOR SCREENING MAMMOGRAM: ICD-10-CM

## 2022-02-15 PROCEDURE — 77063 BREAST TOMOSYNTHESIS BI: CPT

## 2022-02-17 ENCOUNTER — HOSPITAL ENCOUNTER (OUTPATIENT)
Facility: MEDICAL CENTER | Age: 62
End: 2022-02-17
Payer: COMMERCIAL

## 2022-02-17 PROCEDURE — U0005 INFEC AGEN DETEC AMPLI PROBE: HCPCS

## 2022-02-17 PROCEDURE — U0003 INFECTIOUS AGENT DETECTION BY NUCLEIC ACID (DNA OR RNA); SEVERE ACUTE RESPIRATORY SYNDROME CORONAVIRUS 2 (SARS-COV-2) (CORONAVIRUS DISEASE [COVID-19]), AMPLIFIED PROBE TECHNIQUE, MAKING USE OF HIGH THROUGHPUT TECHNOLOGIES AS DESCRIBED BY CMS-2020-01-R: HCPCS

## 2022-03-14 ENCOUNTER — OFFICE VISIT (OUTPATIENT)
Dept: INTERNAL MEDICINE | Facility: IMAGING CENTER | Age: 62
End: 2022-03-14
Payer: COMMERCIAL

## 2022-03-14 VITALS
OXYGEN SATURATION: 98 % | SYSTOLIC BLOOD PRESSURE: 120 MMHG | DIASTOLIC BLOOD PRESSURE: 72 MMHG | TEMPERATURE: 96.9 F | HEIGHT: 66 IN | BODY MASS INDEX: 23.21 KG/M2 | HEART RATE: 77 BPM | RESPIRATION RATE: 14 BRPM
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
## 2022-03-14 DIAGNOSIS — S49.92XA SHOULDER INJURY, LEFT, INITIAL ENCOUNTER: ICD-10-CM

## 2022-03-14 PROCEDURE — 99213 OFFICE O/P EST LOW 20 MIN: CPT | Performed by: FAMILY MEDICINE

## 2022-03-14 ASSESSMENT — PATIENT HEALTH QUESTIONNAIRE - PHQ9: CLINICAL INTERPRETATION OF PHQ2 SCORE: 0

## 2022-03-14 NOTE — PROGRESS NOTES
"Chief Complaint   Patient presents with   • Shoulder Injury     Skiing- left   • Rib Injury     left   • Leg Injury     left       Subjective:     HPI:   Lily Sawant is a 61 y.o. female here to discuss the evaluation and management of: Fall that occurred 10 days ago  She was skiing and fell forward on the ice  She was not going out of fastly as she was done with her run    Initially she had a lot of  L. shoulder, breast and leg pain  Everything is improving except for her left shoulder    She does have more pain at night and with sudden unexpected movement             No problems updated.     Objective:     /72   Pulse 77   Temp 36.1 °C (96.9 °F) (Temporal)   Resp 14   Ht 1.68 m (5' 6.14\")   SpO2 98%  Body mass index is 23.21 kg/m².    Physical Exam:  Physical Exam  Constitutional: Well-developed and well-nourished. Not diaphoretic. No distress.   Skin: Skin is warm and dry. No rash noted.  Head: Atraumatic without lesions.  Eyes: Conjunctivae and extraocular motions are normal.   Ears:  External ears unremarkable.    Nose: Nares patent.       Neck: Supple,      Chest: Effort normal.     Abdomen:  without distention.  .  Extremities: No cyanosis, clubbing, erythema, nor edema.  Ecchymosis anterior left bicep  Decreased external rotation as well as abduction  Neurological: Alert and oriented x 3.    Psychiatric:  Behavior, mood, and affect are appropriate     Assessment and Plan:     The following treatment plan was discussed:     No problem-specific Assessment & Plan notes found for this encounter.  1. Skiing accident, initial encounter    2. Shoulder injury, left, initial encounter-possible rotator cuff injury    - MR-SHOULDER-W/O LEFT; Future  - DX-SHOULDER 2+ LEFT; Future    Discussed may need PT and/or Ortho follow-up depending on results of imaging         Any change or worsening of signs or symptoms, patient encouraged to follow-up or report to emergency room for further evaluation. Patient " verbalizes understanding and agrees.    Follow-Up: As needed      PLEASE NOTE: This dictation was created using voice recognition software. I have made every reasonable attempt to correct obvious errors, but I expect that there are errors of grammar and possibly content that I did not discover before finalizing the note.    My total time spent caring for the patient on the day of the encounter was  greater than 20 minutes.   This includes obtaining history, reviewing chart, physical exam, patient education, reviewing outside records, placing orders, interpreting tests and coordinating care.

## 2022-03-18 ENCOUNTER — NON-PROVIDER VISIT (OUTPATIENT)
Dept: INTERNAL MEDICINE | Facility: IMAGING CENTER | Age: 62
End: 2022-03-18
Payer: COMMERCIAL

## 2022-03-18 DIAGNOSIS — E04.2 MULTIPLE THYROID NODULES: ICD-10-CM

## 2022-03-18 DIAGNOSIS — E55.9 VITAMIN D DEFICIENCY: ICD-10-CM

## 2022-03-18 DIAGNOSIS — K90.0 CELIAC DISEASE: ICD-10-CM

## 2022-03-18 DIAGNOSIS — E78.2 MIXED HYPERLIPIDEMIA: ICD-10-CM

## 2022-03-18 PROCEDURE — 99999 PR NO CHARGE: CPT | Performed by: FAMILY MEDICINE

## 2022-03-21 ENCOUNTER — HOSPITAL ENCOUNTER (OUTPATIENT)
Dept: RADIOLOGY | Facility: MEDICAL CENTER | Age: 62
End: 2022-03-21
Attending: FAMILY MEDICINE
Payer: COMMERCIAL

## 2022-03-21 DIAGNOSIS — S49.92XA SHOULDER INJURY, LEFT, INITIAL ENCOUNTER: ICD-10-CM

## 2022-03-21 LAB
25(OH)D3+25(OH)D2 SERPL-MCNC: 68 NG/ML (ref 30–100)
ALBUMIN SERPL-MCNC: 4.6 G/DL (ref 3.8–4.8)
ALBUMIN/GLOB SERPL: 2.3 {RATIO} (ref 1.2–2.2)
ALP SERPL-CCNC: 87 IU/L (ref 44–121)
ALT SERPL-CCNC: 16 IU/L (ref 0–32)
AST SERPL-CCNC: 24 IU/L (ref 0–40)
BASOPHILS # BLD AUTO: 0 X10E3/UL (ref 0–0.2)
BASOPHILS NFR BLD AUTO: 1 %
BILIRUB SERPL-MCNC: 0.5 MG/DL (ref 0–1.2)
BUN SERPL-MCNC: 17 MG/DL (ref 8–27)
BUN/CREAT SERPL: 22 (ref 12–28)
CALCIUM SERPL-MCNC: 9.3 MG/DL (ref 8.7–10.3)
CHLORIDE SERPL-SCNC: 103 MMOL/L (ref 96–106)
CHOLEST SERPL-MCNC: 180 MG/DL (ref 100–199)
CO2 SERPL-SCNC: 25 MMOL/L (ref 20–29)
CREAT SERPL-MCNC: 0.78 MG/DL (ref 0.57–1)
EGFRCR SERPLBLD CKD-EPI 2021: 85 ML/MIN/1.73
EOSINOPHIL # BLD AUTO: 0.1 X10E3/UL (ref 0–0.4)
EOSINOPHIL NFR BLD AUTO: 3 %
ERYTHROCYTE [DISTWIDTH] IN BLOOD BY AUTOMATED COUNT: 11.8 % (ref 11.7–15.4)
GLOBULIN SER CALC-MCNC: 2 G/DL (ref 1.5–4.5)
GLUCOSE SERPL-MCNC: 81 MG/DL (ref 65–99)
HCT VFR BLD AUTO: 38.9 % (ref 34–46.6)
HDLC SERPL-MCNC: 79 MG/DL
HGB BLD-MCNC: 13.2 G/DL (ref 11.1–15.9)
IMM GRANULOCYTES # BLD AUTO: 0 X10E3/UL (ref 0–0.1)
IMM GRANULOCYTES NFR BLD AUTO: 0 %
IMMATURE CELLS  115398: NORMAL
LABORATORY COMMENT REPORT: NORMAL
LDLC SERPL CALC-MCNC: 92 MG/DL (ref 0–99)
LYMPHOCYTES # BLD AUTO: 1.5 X10E3/UL (ref 0.7–3.1)
LYMPHOCYTES NFR BLD AUTO: 36 %
MCH RBC QN AUTO: 31.6 PG (ref 26.6–33)
MCHC RBC AUTO-ENTMCNC: 33.9 G/DL (ref 31.5–35.7)
MCV RBC AUTO: 93 FL (ref 79–97)
MONOCYTES # BLD AUTO: 0.3 X10E3/UL (ref 0.1–0.9)
MONOCYTES NFR BLD AUTO: 7 %
MORPHOLOGY BLD-IMP: NORMAL
NEUTROPHILS # BLD AUTO: 2.2 X10E3/UL (ref 1.4–7)
NEUTROPHILS NFR BLD AUTO: 53 %
NRBC BLD AUTO-RTO: NORMAL %
PLATELET # BLD AUTO: 215 X10E3/UL (ref 150–450)
POTASSIUM SERPL-SCNC: 3.9 MMOL/L (ref 3.5–5.2)
PROT SERPL-MCNC: 6.6 G/DL (ref 6–8.5)
RBC # BLD AUTO: 4.18 X10E6/UL (ref 3.77–5.28)
SODIUM SERPL-SCNC: 141 MMOL/L (ref 134–144)
TRIGL SERPL-MCNC: 47 MG/DL (ref 0–149)
TSH SERPL DL<=0.005 MIU/L-ACNC: 2.19 UIU/ML (ref 0.45–4.5)
VLDLC SERPL CALC-MCNC: 9 MG/DL (ref 5–40)
WBC # BLD AUTO: 4.1 X10E3/UL (ref 3.4–10.8)

## 2022-03-21 PROCEDURE — 73221 MRI JOINT UPR EXTREM W/O DYE: CPT | Mod: LT

## 2022-03-21 PROCEDURE — 73030 X-RAY EXAM OF SHOULDER: CPT | Mod: LT

## 2022-03-22 DIAGNOSIS — S42.215A CLOSED NONDISPLACED FRACTURE OF SURGICAL NECK OF LEFT HUMERUS, UNSPECIFIED FRACTURE MORPHOLOGY, INITIAL ENCOUNTER: ICD-10-CM

## 2022-04-05 ENCOUNTER — OFFICE VISIT (OUTPATIENT)
Dept: INTERNAL MEDICINE | Facility: IMAGING CENTER | Age: 62
End: 2022-04-05
Payer: COMMERCIAL

## 2022-04-05 VITALS
BODY MASS INDEX: 23.63 KG/M2 | WEIGHT: 147 LBS | OXYGEN SATURATION: 97 % | SYSTOLIC BLOOD PRESSURE: 112 MMHG | TEMPERATURE: 98 F | DIASTOLIC BLOOD PRESSURE: 62 MMHG | HEART RATE: 65 BPM | HEIGHT: 66 IN | RESPIRATION RATE: 14 BRPM

## 2022-04-05 DIAGNOSIS — R92.2 DENSE BREAST: ICD-10-CM

## 2022-04-05 DIAGNOSIS — Z00.00 WELLNESS EXAMINATION: ICD-10-CM

## 2022-04-05 DIAGNOSIS — E78.2 MIXED HYPERLIPIDEMIA: ICD-10-CM

## 2022-04-05 DIAGNOSIS — R92.30 DENSE BREAST: ICD-10-CM

## 2022-04-05 PROCEDURE — 99396 PREV VISIT EST AGE 40-64: CPT | Performed by: FAMILY MEDICINE

## 2022-04-05 ASSESSMENT — FIBROSIS 4 INDEX: FIB4 SCORE: 1.79

## 2022-04-05 NOTE — PROGRESS NOTES
Subjective:     CC:   Chief Complaint   Patient presents with   • Annual Exam       HPI:   Lily Sawant is a 62 y.o. female who presents for annual exam    Patient has GYN provider: No   Last Pap Smear:       Last Mammogram:   Last Bone Density Test:   Last Colorectal Cancer Screenin       Exercise: moderate regular exercise, aerobic < 3 days a week  Diet: gluten free      Patient's last menstrual period was 2008.  She has not utilized hormone replacement therapy.  Denies any menopausal symptoms.  No significant bloating/fluid retention, pelvic pain, or dyspareunia. No abnormal vaginal discharge.   No breast tenderness, mass, nipple discharge or changes in size or contour.    OB History    Para Term  AB Living   6 4 0 0 2 0   SAB IAB Ectopic Molar Multiple Live Births   2 0 0 0 0 0      She  reports being sexually active and has had partner(s) who are male.    She  has a past medical history of Celiac disease (2015), Family history of ischemic heart disease (IHD), FH: renal cell carcinoma (2015), Hyperlipidemia (2015), Migraine headache (2015), and Thyroid disease.    She has no past medical history of History of melanoma or Personal history of renal cancer.  She  has a past surgical history that includes tonsillectomy and dilation and evacuation ( & ).    Family History   Problem Relation Age of Onset   • Arthritis Mother    • Glasses Mother    • Other Mother    • Heart Disease Mother    • Cancer Sister         Melanoma   • Other Sister    • Cancer Father         Renal & Lung   • Lung Disease Father    • Glasses Father    • Cancer Brother         Melanoma - eye   • Other Brother    • Glaucoma Brother    • Glasses Child    • Other Child         SIDS   • Glasses Child    • Glasses Child    • Cancer Paternal Grandfather         Renal   • Heart Disease Maternal Grandmother    • Heart Attack Maternal Uncle 65        MI     Social History      Tobacco Use   • Smoking status: Former Smoker     Packs/day: 0.25     Years: 3.00     Pack years: 0.75     Types: Cigarettes     Quit date: 1978     Years since quittin.1   • Smokeless tobacco: Never Used   Vaping Use   • Vaping Use: Never used   Substance Use Topics   • Alcohol use: Yes     Alcohol/week: 4.2 oz     Types: 7 Glasses of wine per week     Comment: 7   • Drug use: No       Patient Active Problem List    Diagnosis Date Noted   • Multiple thyroid nodules 2021   • Dermatochalasis of left upper eyelid 2021   • Glaucoma suspect of both eyes 2021   • Age-related nuclear cataract of both eyes 2021   • PVD (posterior vitreous detachment), bilateral 2019   • 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 10/23/2017   • Vitamin D deficiency 2016   • Mixed hyperlipidemia 2016   • Family history of ischemic heart disease (IHD)    • Atherosclerosis of both carotid arteries, mild per 2016 Ultrasound 2016   • Postmenopausal 2016   • Renal cyst, left 2016   • Chronic migraine 2015   • Celiac disease 2015   • FH: renal cell carcinoma 2015   • Family history of malignant melanoma 2015     Current Outpatient Medications   Medication Sig Dispense Refill   • atorvastatin (LIPITOR) 20 MG Tab TAKE 1 TABLET BY MOUTH EVERYDAY AT BEDTIME 90 Tablet 3   • Multiple Vitamins-Minerals (WOMENS DAILY FORMULA PO) Take  by mouth.     • Cholecalciferol (VITAMIN D) 2000 units Cap Take 2,000 Units by mouth every day.     • aspirin EC (ECOTRIN) 81 MG Tablet Delayed Response Take 81 mg by mouth every day.       No current facility-administered medications for this visit.     Allergies   Allergen Reactions   • Gluten Meal Diarrhea   • Pineapple Diarrhea       Review of Systems    Constitutional: Negative for fever, chills and malaise/fatigue.   HENT: Negative for congestion.    Eyes: Negative for pain.   Respiratory: Negative for cough and  "shortness of breath.    Cardiovascular: Negative for chest pain and leg swelling.   Gastrointestinal: Negative for nausea, vomiting, abdominal pain and diarrhea.   Genitourinary: Negative for dysuria and hematuria.   Skin: Negative for rash.   Neurological: Negative for dizziness, focal weakness and headaches.   Endo/Heme/Allergies: Does not bruise/bleed easily.   Psychiatric/Behavioral: Negative for depression.  The patient is not nervous/anxious.      Objective:   /62   Pulse 65   Temp 36.7 °C (98 °F) (Temporal)   Resp 14   Ht 1.68 m (5' 6.14\")   Wt 66.7 kg (147 lb)   LMP 08/25/2008   SpO2 97%   BMI 23.62 kg/m²     Wt Readings from Last 4 Encounters:   04/05/22 66.7 kg (147 lb)   11/03/21 65.5 kg (144 lb 6.4 oz)   04/30/21 64.4 kg (142 lb)   04/01/21 65.3 kg (144 lb)        Physical Exam:   Constitutional: Well-developed and well-nourished. Not diaphoretic. No distress.   Skin: Skin is warm and dry. No rash noted.  Head: Atraumatic without lesions.  Eyes: Conjunctivae and extraocular motions are normal. Pupils are equal, round, and reactive to light. No scleral icterus.   Ears:  External ears unremarkable. Tympanic membranes clear and intact.  Nose: Nares patent. Septum midline. Turbinates without erythema nor edema. No discharge.   Mouth/Throat: Tongue normal. Oropharynx is clear and moist. Posterior pharynx without erythema or exudates.  Neck: Supple, trachea midline. Normal range of motion. No thyromegaly present. No lymphadenopathy--cervical or supraclavicular.  Cardiovascular: Regular rate and rhythm, S1 and S2 without murmur, rubs, or gallops.    Respiratory: Effort normal. Clear to auscultation throughout. No adventitious sounds.   Breast: Breasts examined  supine. No skin changes, peau d'orange or nipple retraction. No discharge. No axillary or supraclavicular adenopathy. No masses or nodularity palpable.  Abdomen: Soft, non tender, and without distention. Active bowel sounds in all four " quadrants. No rebound, guarding, masses or HSM.    Extremities: No cyanosis, clubbing, erythema, nor edema. Distal pulses intact and symmetric.   Musculoskeletal: All major joints AROM full in all directions without pain.  Neurological: Alert and oriented x 3. Grossly non-focal. Strength and sensation grossly intact. DTRs 2+/3 and symmetric.   Psychiatric:  Behavior, mood, and affect are appropriate.  Orders Only on 03/17/2022   Component Date Value Ref Range Status   • WBC 03/17/2022 4.1  3.4 - 10.8 x10E3/uL Final   • RBC 03/17/2022 4.18  3.77 - 5.28 x10E6/uL Final   • Hemoglobin 03/17/2022 13.2  11.1 - 15.9 g/dL Final   • Hematocrit 03/17/2022 38.9  34.0 - 46.6 % Final   • MCV 03/17/2022 93  79 - 97 fL Final   • MCH 03/17/2022 31.6  26.6 - 33.0 pg Final   • MCHC 03/17/2022 33.9  31.5 - 35.7 g/dL Final   • RDW 03/17/2022 11.8  11.7 - 15.4 % Final   • Platelet Count 03/17/2022 215  150 - 450 x10E3/uL Final   • Neutrophils-Polys 03/17/2022 53  Not Estab. % Final   • Lymphocytes 03/17/2022 36  Not Estab. % Final   • Monocytes 03/17/2022 7  Not Estab. % Final   • Eosinophils 03/17/2022 3  Not Estab. % Final   • Basophils 03/17/2022 1  Not Estab. % Final   • Immature Cells 03/17/2022 CANCELED   Final    Result canceled by the ancillary.   • Neutrophils (Absolute) 03/17/2022 2.2  1.4 - 7.0 x10E3/uL Final   • Lymphs (Absolute) 03/17/2022 1.5  0.7 - 3.1 x10E3/uL Final   • Monos (Absolute) 03/17/2022 0.3  0.1 - 0.9 x10E3/uL Final   • Eos (Absolute) 03/17/2022 0.1  0.0 - 0.4 x10E3/uL Final   • Baso (Absolute) 03/17/2022 0.0  0.0 - 0.2 x10E3/uL Final   • Immature Granulocytes 03/17/2022 0  Not Estab. % Final   • Immature Granulocytes (abs) 03/17/2022 0.0  0.0 - 0.1 x10E3/uL Final   • Nucleated RBC 03/17/2022 CANCELED   Final    Result canceled by the ancillary.   • Comments-Diff 03/17/2022 CANCELED   Final    Result canceled by the ancillary.   • Glucose 03/17/2022 81  65 - 99 mg/dL Final   • Bun 03/17/2022 17  8 - 27 mg/dL  Final   • Creatinine 03/17/2022 0.78  0.57 - 1.00 mg/dL Final   • eGFR 03/17/2022 85  >59 mL/min/1.73 Final   • Bun-Creatinine Ratio 03/17/2022 22  12 - 28 Final   • Sodium 03/17/2022 141  134 - 144 mmol/L Final   • Potassium 03/17/2022 3.9  3.5 - 5.2 mmol/L Final   • Chloride 03/17/2022 103  96 - 106 mmol/L Final   • Co2 03/17/2022 25  20 - 29 mmol/L Final   • Calcium 03/17/2022 9.3  8.7 - 10.3 mg/dL Final   • Total Protein 03/17/2022 6.6  6.0 - 8.5 g/dL Final   • Albumin 03/17/2022 4.6  3.8 - 4.8 g/dL Final   • Globulin 03/17/2022 2.0  1.5 - 4.5 g/dL Final   • A-G Ratio 03/17/2022 2.3 (A) 1.2 - 2.2 Final   • Total Bilirubin 03/17/2022 0.5  0.0 - 1.2 mg/dL Final   • Alkaline Phosphatase 03/17/2022 87  44 - 121 IU/L Final   • AST(SGOT) 03/17/2022 24  0 - 40 IU/L Final   • ALT(SGPT) 03/17/2022 16  0 - 32 IU/L Final   • Cholesterol,Tot 03/17/2022 180  100 - 199 mg/dL Final   • Triglycerides 03/17/2022 47  0 - 149 mg/dL Final   • HDL 03/17/2022 79  >39 mg/dL Final   • VLDL Cholesterol Calc 03/17/2022 9  5 - 40 mg/dL Final   • LDL Chol Calc (NIH) 03/17/2022 92  0 - 99 mg/dL Final   • Comment: 03/17/2022 CANCELED   Final    Result canceled by the ancillary.   • TSH 03/17/2022 2.190  0.450 - 4.500 uIU/mL Final   • 25-Hydroxy   Vitamin D 25 03/17/2022 68.0  30.0 - 100.0 ng/mL Final    Comment: Vitamin D deficiency has been defined by the [place] of  Medicine and an Endocrine Society practice guideline as a  level of serum 25-OH vitamin D less than 20 ng/mL (1,2).  The Endocrine Society went on to further define vitamin D  insufficiency as a level between 21 and 29 ng/mL (2).  1. IOM ([place] of Medicine). 2010. Dietary reference  intakes for calcium and D. [place]: The  National [employer].  2. Leonor OVIEDO, Sheng JASSO, Juliano CRUZ, et al.  Evaluation, treatment, and prevention of vitamin D  deficiency: an Endocrine Society clinical practice  guideline. JCEM. 2011 Jul; 96(7):1911-30.  A duplicate report  "has been generated due to demographic updates.     Hospital Outpatient Visit on 02/17/2022   Component Date Value Ref Range Status   • COVID Order Status 02/17/2022 Received   Final    Comment: The order for SARS CoV-2 testing has been received by the  Laboratory. This result is neither positive nor negative.  Final results of testing will report separately.     • SARS-CoV-2 Source 02/17/2022 Nasal Swab   Final   • SARS-CoV-2 by PCR 02/17/2022 NotDetected   Final    Comment: PATIENTS: Important information regarding your results and instructions can  be found at [email or web address]   \"After your  Covid-19 Test\"    [facility] providers: PLEASE REFER TO DE-ESCALATION AND RETESTING PROTOCOL  on [email or web address]    **The Roche SARS-CoV-2 RT-PCR test has been made available for use under the  Emergency Use Authorization (EUA) only.     Hospital Outpatient Visit on 11/02/2021   Component Date Value Ref Range Status   • 25-Hydroxy   Vitamin D 25 11/02/2021 67  30 - 100 ng/mL Final    Comment: Adult Ranges:   <20 ng/mL - Deficiency  20-29 ng/mL - Insufficiency   ng/mL - Sufficiency  Effective 3/18/2020, this electrochemiluminescence binding assay is  performed using Roche adreil e immunoassay analyzer.  The Elecsys Vitamin D  total II assay is intended for the quantitative determination of total 25  hydroxyvitamin D in human serum and plasma. This assay is to be used as an  aid in the assessment of vitamin D sufficiency in adults.     Hospital Outpatient Visit on 11/02/2021   Component Date Value Ref Range Status   • Calcitonin 11/02/2021 <2.0  0.0 - 5.1 pg/mL Final    Comment: INTERPRETIVE INFORMATION: Calcitonin  Calcitonin levels greater than 100 pg/mL may occur in the  following conditions: medullary thyroid carcinomas (MTC),  leukemias, and myeloproliferative disorders.  Provocative testing (calcium) is suggested in patients with MTC if  the calcitonin is not clearly diagnostic.  The " Siemens Immulite 2000 method is used.  Results obtained with  different assay methods or kits cannot be used interchangeably.  Calcitonin is useful in monitoring medullary thyroid carcinoma.  The calcitonin assay value, regardless of level, should not be  interpreted as absolute evidence of the presence or absence of  malignant disease.  Performed By: [facility]  [address] [place] [id number]  : Apoorva Perdomo MD     • TSH 11/02/2021 2.420  0.380 - 5.330 uIU/mL Final    Comment: Reference Range:    Pregnant Females, 1st Trimester  0.050-3.700  Pregnant Females, 2nd Trimester  0.310-4.350  Pregnant Females, 3rd Trimester  0.410-5.180     • Free T-4 11/02/2021 1.27  0.93 - 1.70 ng/dL Final   • Sodium 11/02/2021 139  135 - 145 mmol/L Final   • Potassium 11/02/2021 3.9  3.6 - 5.5 mmol/L Final   • Chloride 11/02/2021 102  96 - 112 mmol/L Final   • Co2 11/02/2021 27  20 - 33 mmol/L Final   • Anion Gap 11/02/2021 10.0  7.0 - 16.0 Final   • Glucose 11/02/2021 139 (A) 65 - 99 mg/dL Final   • Bun 11/02/2021 15  8 - 22 mg/dL Final   • Creatinine 11/02/2021 0.70  0.50 - 1.40 mg/dL Final   • Calcium 11/02/2021 9.8  8.5 - 10.5 mg/dL Final   • AST(SGOT) 11/02/2021 23  12 - 45 U/L Final   • ALT(SGPT) 11/02/2021 22  2 - 50 U/L Final   • Alkaline Phosphatase 11/02/2021 56  30 - 99 U/L Final   • Total Bilirubin 11/02/2021 0.4  0.1 - 1.5 mg/dL Final   • Albumin 11/02/2021 4.6  3.2 - 4.9 g/dL Final   • Total Protein 11/02/2021 7.0  6.0 - 8.2 g/dL Final   • Globulin 11/02/2021 2.4  1.9 - 3.5 g/dL Final   • A-G Ratio 11/02/2021 1.9  g/dL Final   • GFR If  11/02/2021 >60  >60 mL/min/1.73 m 2 Final   • GFR If Non  11/02/2021 >60  >60 mL/min/1.73 m 2 Final   ]  Assessment and Plan:     1. Wellness examination-reviewed labs    2. Mixed hyperlipidemia-stable on statin    3. Dense breast  - US-SCREENING WHOLE BREAST BILATERAL (3D SCREENING); Future      Health maintenance:        Immunizations UTD  Patient counseled about skin care, diet, supplements, and exercise.  Discussed  mammography screening, colorectal cancer screening     Follow-up: No follow-ups on file.

## 2022-04-25 ENCOUNTER — OFFICE VISIT (OUTPATIENT)
Dept: OPHTHALMOLOGY | Facility: MEDICAL CENTER | Age: 62
End: 2022-04-25
Payer: COMMERCIAL

## 2022-04-25 DIAGNOSIS — H25.13 AGE-RELATED NUCLEAR CATARACT OF BOTH EYES: ICD-10-CM

## 2022-04-25 DIAGNOSIS — H43.813 PVD (POSTERIOR VITREOUS DETACHMENT), BILATERAL: ICD-10-CM

## 2022-04-25 DIAGNOSIS — H40.003 GLAUCOMA SUSPECT OF BOTH EYES: ICD-10-CM

## 2022-04-25 DIAGNOSIS — H02.834 DERMATOCHALASIS OF LEFT UPPER EYELID: ICD-10-CM

## 2022-04-25 DIAGNOSIS — H52.13 MYOPIA OF BOTH EYES: ICD-10-CM

## 2022-04-25 PROCEDURE — 99213 OFFICE O/P EST LOW 20 MIN: CPT | Mod: 25 | Performed by: OPHTHALMOLOGY

## 2022-04-25 PROCEDURE — 92250 FUNDUS PHOTOGRAPHY W/I&R: CPT | Performed by: OPHTHALMOLOGY

## 2022-04-25 ASSESSMENT — CUP TO DISC RATIO
OD_RATIO: 0.2
OS_RATIO: 0.2

## 2022-04-25 ASSESSMENT — REFRACTION_WEARINGRX
OD_AXIS: 086
OS_ADD: +2.50
OD_CYLINDER: +2.00
OS_CYLINDER: +1.50
OD_ADD: +2.25
OD_SPHERE: -2.00
OS_SPHERE: -2.50
SPECS_TYPE: TRIFOCAL
OS_AXIS: 084

## 2022-04-25 ASSESSMENT — TONOMETRY
OD_IOP_MMHG: 18
OS_IOP_MMHG: 15
OS_IOP_MMHG: 21
OD_IOP_MMHG: 13

## 2022-04-25 ASSESSMENT — REFRACTION_MANIFEST
OD_CYLINDER: +2.00
OS_SPHERE: -1.75
OD_SPHERE: -1.25
OS_CYLINDER: +1.50
METHOD_AUTOREFRACTION: 1
OD_AXIS: 089
OS_AXIS: 097

## 2022-04-25 ASSESSMENT — REFRACTION
OD_SPHERE: -1.50
OD_CYLINDER: +2.25
OS_CYLINDER: +1.75
OD_AXIS: 087
OS_AXIS: 090
OS_SPHERE: +1.50

## 2022-04-25 ASSESSMENT — CONF VISUAL FIELD
OS_NORMAL: 1
OD_NORMAL: 1

## 2022-04-25 ASSESSMENT — VISUAL ACUITY
OS_CC: J1
OD_CC: 20/20
OD_CC: J1
OS_CC: 20/25
METHOD: SNELLEN - LINEAR

## 2022-04-25 ASSESSMENT — EXTERNAL EXAM - RIGHT EYE: OD_EXAM: NORMAL

## 2022-04-25 ASSESSMENT — PACHYMETRY
OS_CT(UM): 16
OD_CT(UM): 16

## 2022-04-25 ASSESSMENT — EXTERNAL EXAM - LEFT EYE: OS_EXAM: NORMAL

## 2022-04-25 ASSESSMENT — SLIT LAMP EXAM - LIDS
COMMENTS: PTOSIS, DERMATOCHALASIS - UPPER LID
COMMENTS: NORMAL

## 2022-04-25 NOTE — ASSESSMENT & PLAN NOTE
2/8/2021 - mild left upper lid dermatochalasis leading to some ptosis. No obscuring visual axis.   4/25/2022 - gave name of Daniel De La Torre

## 2022-04-25 NOTE — ASSESSMENT & PLAN NOTE
8/7/2019 - Posterior vitreous detachments most likely leading to flashes and floaters. No retinal holes or tears, no peripheral retinal pigmentary changes. Also obtain OCT NFL thickness that was 79 OD and 79 OS with normal ganglion cell thickness, so no evidence of an optic neuropathy or retrograde axonal degeneration. Thus discussed the warning signs of retinal holes and tears. Recommended re-eval retina in 6 months or sooner if symptoms worsen.   2/10/2020 - Overall stable. Some minimal peripheral pigmentary changes, but I do not detect any tears of holes. Would like to re-eval in 1 years. Discussed that if symptoms worsen will refer to retina.  2/8/2021 - improved, no tears or holes  4/25/2022 - Stable, no tears or holes

## 2022-04-25 NOTE — ASSESSMENT & PLAN NOTE
2/8/2021 - OCT NFL thickness normal at 80 OD and 79 OS, stable  4/25/2022 - Ave NFl thickness stable at 79 OD and 76 OS, with tilting.

## 2022-04-25 NOTE — PROGRESS NOTES
Peds/Neuro Ophthalmology:   Daniel Tatum M.D.    Date & Time note created:    4/25/2022   9:50 AM     Referring MD / APRN:  Linh Al M.D., No att. providers found    Patient ID:  Name:             Aysha Sawant   YOB: 1960  Age:                 62 y.o.  female   MRN:               [id number]    Chief Complaint/Reason for Visit:     Other (PVD)      History of Present Illness:    AYSHA Sawant is a 62 y.o. female   Follow up PVD.Still has floaters but no flashes or shadows.Vision stable,glasses are 4 years old.      Review of Systems:  Review of Systems   Eyes:        Floaters   All other systems reviewed and are negative.      Past Medical History:   Past Medical History:   Diagnosis Date   • Celiac disease 2/13/2015   • Family history of ischemic heart disease (IHD)    • FH: renal cell carcinoma 2/13/2015   • Hyperlipidemia 2/13/2015   • Migraine headache 2/13/2015   • Thyroid disease     nodules       Past Surgical History:  Past Surgical History:   Procedure Laterality Date   • DILATION AND EVACUATION  1985 & 1987   • TONSILLECTOMY         Current Outpatient Medications:  Current Outpatient Medications   Medication Sig Dispense Refill   • atorvastatin (LIPITOR) 20 MG Tab TAKE 1 TABLET BY MOUTH EVERYDAY AT BEDTIME 90 Tablet 3   • Multiple Vitamins-Minerals (WOMENS DAILY FORMULA PO) Take  by mouth.     • Cholecalciferol (VITAMIN D) 2000 units Cap Take 2,000 Units by mouth every day.     • aspirin EC (ECOTRIN) 81 MG Tablet Delayed Response Take 81 mg by mouth every day.       No current facility-administered medications for this visit.       Allergies:  Allergies   Allergen Reactions   • Gluten Meal Diarrhea   • Pineapple Diarrhea       Family History:  Family History   Problem Relation Age of Onset   • Arthritis Mother    • Glasses Mother    • Other Mother    • Heart Disease Mother    • Cancer Sister         Melanoma   • Other Sister    • Cancer Father         Renal  & Lung   • Lung Disease Father    • Glasses Father    • Cancer Brother         Melanoma - eye   • Other Brother    • Glaucoma Brother    • Glasses Child    • Other Child         SIDS   • Glasses Child    • Glasses Child    • Cancer Paternal Grandfather         Renal   • Heart Disease Maternal Grandmother    • Heart Attack Maternal Uncle 65        MI       Social History:  Social History     Socioeconomic History   • Marital status:      Spouse name: Not on file   • Number of children: Not on file   • Years of education: Not on file   • Highest education level: Not on file   Occupational History   • Not on file   Tobacco Use   • Smoking status: Former Smoker     Packs/day: 0.25     Years: 3.00     Pack years: 0.75     Types: Cigarettes     Quit date: 1978     Years since quittin.2   • Smokeless tobacco: Never Used   Vaping Use   • Vaping Use: Never used   Substance and Sexual Activity   • Alcohol use: Yes     Alcohol/week: 4.2 oz     Types: 7 Glasses of wine per week     Comment: 7   • Drug use: No   • Sexual activity: Yes     Partners: Male   Other Topics Concern   • Not on file   Social History Narrative    , has 3 kids     works part time      Social Determinants of Health     Financial Resource Strain: Not on file   Food Insecurity: Not on file   Transportation Needs: Not on file   Physical Activity: Not on file   Stress: Not on file   Social Connections: Not on file   Intimate Partner Violence: Not on file   Housing Stability: Not on file          Physical Exam:  Physical Exam    Oriented x 3  Weight/BMI: There is no height or weight on file to calculate BMI.  There were no vitals taken for this visit.    Base Eye Exam     Visual Acuity (Snellen - Linear)       Right Left    Dist cc 20/20 20/25    Near cc J1 J1          Tonometry (i care, 8:31 AM)       Right Left    Pressure 18 21          Tonometry #2 (9:18 AM)       Right Left    Pressure 13 15          Pupils       Pupils    Right  PERRL    Left PERRL          Visual Fields       Right Left     Full Full          Extraocular Movement       Right Left     Full, Ortho Full, Ortho          Neuro/Psych     Oriented x3: Yes    Mood/Affect: Normal          Dilation     Both eyes: Tropicamide (MYDRIACYL) 1% ophthalmic solution, Phenylephrine (NEOSYNEPHRINE) ophthalmic solution 2.5% @ 9:18 AM            Slit Lamp and Fundus Exam     External Exam       Right Left    External Normal Normal          Slit Lamp Exam       Right Left    Lids/Lashes Normal Ptosis, Dermatochalasis - upper lid    Conjunctiva/Sclera White and quiet White and quiet    Cornea Clear Clear    Anterior Chamber Deep and quiet Deep and quiet    Iris Round and reactive Round and reactive    Lens Nuclear sclerosis Nuclear sclerosis    Vitreous Normal Normal          Fundus Exam       Right Left    Disc Normal Normal    C/D Ratio 0.2 0.2    Macula Normal Normal    Vessels Normal Normal    Periphery PVD, Pigmentation PVD, Pigmentation            Refraction     Wearing Rx       Sphere Cylinder Axis Add    Right -2.00 +2.00 086 +2.25    Left -2.50 +1.50 084 +2.50    Age: 2yrs    Type: Trifocal          Manifest Refraction (Auto)       Sphere Cylinder Axis    Right -1.25 +2.00 089    Left -1.75 +1.50 097          Cycloplegic Refraction (Auto)       Sphere Cylinder Axis    Right -1.50 +2.25 087    Left +1.50 +1.75 090          Final Rx       Sphere Cylinder Axis Add    Right -2.00 +2.00 085 +2.50    Left -2.25 +1.50 090 +2.50    Type: Trifocal                Pertinent Lab/Test/Imaging Review:      Assessment and Plan:     Glaucoma suspect of both eyes  2/8/2021 - OCT NFL thickness normal at 80 OD and 79 OS, stable  4/25/2022 - Ave NFl thickness stable at 79 OD and 76 OS, with tilting.     PVD (posterior vitreous detachment), bilateral  8/7/2019 - Posterior vitreous detachments most likely leading to flashes and floaters. No retinal holes or tears, no peripheral retinal pigmentary changes.  Also obtain OCT NFL thickness that was 79 OD and 79 OS with normal ganglion cell thickness, so no evidence of an optic neuropathy or retrograde axonal degeneration. Thus discussed the warning signs of retinal holes and tears. Recommended re-eval retina in 6 months or sooner if symptoms worsen.   2/10/2020 - Overall stable. Some minimal peripheral pigmentary changes, but I do not detect any tears of holes. Would like to re-eval in 1 years. Discussed that if symptoms worsen will refer to retina.  2/8/2021 - improved, no tears or holes  4/25/2022 - Stable, no tears or holes    Age-related nuclear cataract of both eyes  2/8/2021 - early ns cataracts. No visually signficant  4/25/2022 - slight progression    Dermatochalasis of left upper eyelid  2/8/2021 - mild left upper lid dermatochalasis leading to some ptosis. No obscuring visual axis.   4/25/2022 - gave name of Daniel De La Torre    Myopia of both eyes  4/25/2022 - No change in rx        Daniel Tatum M.D.

## 2022-04-27 ENCOUNTER — HOSPITAL ENCOUNTER (OUTPATIENT)
Dept: RADIOLOGY | Facility: MEDICAL CENTER | Age: 62
End: 2022-04-27
Attending: FAMILY MEDICINE
Payer: COMMERCIAL

## 2022-04-27 DIAGNOSIS — R92.2 DENSE BREAST: ICD-10-CM

## 2022-04-27 DIAGNOSIS — R92.30 DENSE BREAST: ICD-10-CM

## 2022-04-27 PROCEDURE — 76641 ULTRASOUND BREAST COMPLETE: CPT

## 2022-05-27 ENCOUNTER — PROCEDURE VISIT (OUTPATIENT)
Dept: ENDOCRINOLOGY | Facility: MEDICAL CENTER | Age: 62
End: 2022-05-27
Attending: INTERNAL MEDICINE
Payer: COMMERCIAL

## 2022-05-27 ENCOUNTER — HOSPITAL ENCOUNTER (OUTPATIENT)
Dept: LAB | Facility: MEDICAL CENTER | Age: 62
End: 2022-05-27
Attending: INTERNAL MEDICINE
Payer: COMMERCIAL

## 2022-05-27 DIAGNOSIS — M85.80 OSTEOPENIA, UNSPECIFIED LOCATION: ICD-10-CM

## 2022-05-27 DIAGNOSIS — E04.2 MULTIPLE THYROID NODULES: ICD-10-CM

## 2022-05-27 DIAGNOSIS — E55.9 VITAMIN D DEFICIENCY: ICD-10-CM

## 2022-05-27 LAB
25(OH)D3 SERPL-MCNC: 76 NG/ML (ref 30–100)
ALBUMIN SERPL BCP-MCNC: 5 G/DL (ref 3.2–4.9)
ALBUMIN/GLOB SERPL: 1.6 G/DL
ALP SERPL-CCNC: 76 U/L (ref 30–99)
ALT SERPL-CCNC: 24 U/L (ref 2–50)
ANION GAP SERPL CALC-SCNC: 10 MMOL/L (ref 7–16)
AST SERPL-CCNC: 29 U/L (ref 12–45)
BILIRUB SERPL-MCNC: 0.5 MG/DL (ref 0.1–1.5)
BUN SERPL-MCNC: 20 MG/DL (ref 8–22)
CALCIUM SERPL-MCNC: 10 MG/DL (ref 8.4–10.2)
CHLORIDE SERPL-SCNC: 103 MMOL/L (ref 96–112)
CO2 SERPL-SCNC: 29 MMOL/L (ref 20–33)
CREAT SERPL-MCNC: 0.7 MG/DL (ref 0.5–1.4)
GFR SERPLBLD CREATININE-BSD FMLA CKD-EPI: 96 ML/MIN/1.73 M 2
GLOBULIN SER CALC-MCNC: 3.1 G/DL (ref 1.9–3.5)
GLUCOSE SERPL-MCNC: 76 MG/DL (ref 65–99)
PHOSPHATE SERPL-MCNC: 3.4 MG/DL (ref 2.5–4.5)
POTASSIUM SERPL-SCNC: 4.1 MMOL/L (ref 3.6–5.5)
PROT SERPL-MCNC: 8.1 G/DL (ref 6–8.2)
PTH-INTACT SERPL-MCNC: 26.8 PG/ML (ref 14–72)
SODIUM SERPL-SCNC: 142 MMOL/L (ref 135–145)
T4 FREE SERPL-MCNC: 1.18 NG/DL (ref 0.93–1.7)
TSH SERPL DL<=0.005 MIU/L-ACNC: 1.21 UIU/ML (ref 0.38–5.33)

## 2022-05-27 PROCEDURE — 36415 COLL VENOUS BLD VENIPUNCTURE: CPT

## 2022-05-27 PROCEDURE — 84439 ASSAY OF FREE THYROXINE: CPT

## 2022-05-27 PROCEDURE — 84165 PROTEIN E-PHORESIS SERUM: CPT

## 2022-05-27 PROCEDURE — 84155 ASSAY OF PROTEIN SERUM: CPT

## 2022-05-27 PROCEDURE — 84443 ASSAY THYROID STIM HORMONE: CPT

## 2022-05-27 PROCEDURE — 80053 COMPREHEN METABOLIC PANEL: CPT

## 2022-05-27 PROCEDURE — 83970 ASSAY OF PARATHORMONE: CPT

## 2022-05-27 PROCEDURE — 82306 VITAMIN D 25 HYDROXY: CPT

## 2022-05-27 PROCEDURE — 84100 ASSAY OF PHOSPHORUS: CPT

## 2022-05-27 PROCEDURE — 76536 US EXAM OF HEAD AND NECK: CPT | Performed by: INTERNAL MEDICINE

## 2022-06-01 ENCOUNTER — OFFICE VISIT (OUTPATIENT)
Dept: ENDOCRINOLOGY | Facility: MEDICAL CENTER | Age: 62
End: 2022-06-01
Attending: INTERNAL MEDICINE
Payer: COMMERCIAL

## 2022-06-01 VITALS
BODY MASS INDEX: 22.82 KG/M2 | DIASTOLIC BLOOD PRESSURE: 76 MMHG | HEIGHT: 66 IN | HEART RATE: 76 BPM | OXYGEN SATURATION: 98 % | SYSTOLIC BLOOD PRESSURE: 114 MMHG | WEIGHT: 142 LBS

## 2022-06-01 DIAGNOSIS — Z78.0 POST-MENOPAUSAL: ICD-10-CM

## 2022-06-01 DIAGNOSIS — M85.80 OSTEOPENIA, UNSPECIFIED LOCATION: ICD-10-CM

## 2022-06-01 DIAGNOSIS — E55.9 VITAMIN D DEFICIENCY: ICD-10-CM

## 2022-06-01 DIAGNOSIS — E04.2 MULTIPLE THYROID NODULES: ICD-10-CM

## 2022-06-01 LAB
ALBUMIN SERPL ELPH-MCNC: 4.82 G/DL (ref 3.75–5.01)
ALPHA1 GLOB SERPL ELPH-MCNC: 0.37 G/DL (ref 0.19–0.46)
ALPHA2 GLOB SERPL ELPH-MCNC: 0.82 G/DL (ref 0.48–1.05)
B-GLOBULIN SERPL ELPH-MCNC: 0.8 G/DL (ref 0.48–1.1)
GAMMA GLOB SERPL ELPH-MCNC: 1 G/DL (ref 0.62–1.51)
INTERPRETATION SERPL IFE-IMP: NORMAL
MONOCLON BAND OBS SERPL: NORMAL
PATHOLOGY STUDY: NORMAL
PROT SERPL-MCNC: 7.8 G/DL (ref 6.3–8.2)

## 2022-06-01 PROCEDURE — 99214 OFFICE O/P EST MOD 30 MIN: CPT | Performed by: INTERNAL MEDICINE

## 2022-06-01 PROCEDURE — 99211 OFF/OP EST MAY X REQ PHY/QHP: CPT | Performed by: INTERNAL MEDICINE

## 2022-06-01 ASSESSMENT — FIBROSIS 4 INDEX: FIB4 SCORE: 1.76

## 2022-06-02 NOTE — PROGRESS NOTES
Chief Complaint: Follow up of Thyroid Nodules    HPI:     Lily Sawant is a 62 y.o. female with history of Thyroid Nodules is here for follow up evaluation.     Her comorbid issues include celiac disease, hyperlipidemia, renal cyst and osteopenia with low fracture risk based on FRAX    She was incidentally found to have thyroid nodules on carotid ultrasound done in 2021.    She denies a family history of thyroid cancer and denies radiation exposure.    Formal ultrasound on May 2021 showed a dominant 2 cm isoechoic solid nodule on the right lower lobe which was biopsied and proven benign on June 1, 2021.   She also had a low risk 1.3 cm complex cyst with internal septations on the right upper lobe         On follow-up she is doing well  She denies dysphagia hoarseness and dyspnea  She denies interval falls and fractures      She recently had an ultrasound in the office on May 27, 2022 which showed stability of the previously detected nodules on the right upper lobe and right lower lobe      Her most recent TSH is normal at 1.2 with a free T4 1.18   on May 2022    Vitamin D is 76  Calcium was 10.0  Serum creatinine is 0.70      She does have osteopenia diagnosed on her bone density on April 28, 2021  She has parental history of hip fracture  She denies personal history of fragility fractures  Although she did sustain a hairline fracture of her shoulder     Her lowest T score was -1.2 for the left hip  When compared to her previous bone density there was a significant decrease in bone mineral density for the lumbar spine and left hip.  FRAX scores were not provided            Patient's medications, allergies, and social histories were reviewed and updated as appropriate.      ROS:      CONS:     No fever, no chills   EYES:     No diplopia, no blurry vision   CV:           No chest pain, no palpitations   PULM:     No SOB, no cough, no hemoptysis.   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  2016: Elevated Lp(a)  2016: Vitamin D deficiency  2016: Mixed hyperlipidemia  2016: Atherosclerosis of both carotid arteries, mild per 2016   Ultrasound  2016: Postmenopausal  2016: Renal cyst, left  2016: Right foot pain  2016: Onychomycosis of left great toe  2015: Right shoulder pain  2015: Chronic migraine  2015: Depression  2015: Celiac disease  2015: FH: renal cell carcinoma  2015: Family history of malignant melanoma  2015: Hyperlipidemia  Family history of ischemic heart disease (IHD)      Past Surgical History:  Past Surgical History:   Procedure Laterality Date   • DILATION AND EVACUATION   &    • TONSILLECTOMY          Allergies:  Gluten meal and Pineapple     Social History:  Social History     Tobacco Use   • Smoking status: Former Smoker     Packs/day: 0.25     Years: 3.00     Pack years: 0.75     Types: Cigarettes     Quit date: 1978     Years since quittin.3   • Smokeless tobacco: Never Used   Vaping Use   • Vaping Use: Never used   Substance Use Topics   • Alcohol use: Yes     Alcohol/week: 4.2 oz     Types: 7 Glasses of wine per week     Comment: 7   • Drug use: No        Family History:   family history includes Arthritis in her mother; Cancer in her brother, father, paternal grandfather, and sister; Glasses in her child, child, child, father, and mother; Glaucoma in her brother; Heart Attack (age of onset: 65) in her maternal uncle; Heart  "Disease in her maternal grandmother and mother; Lung Disease in her father; Other in her brother, child, mother, and sister.      PHYSICAL EXAM:   Vital signs: /76 (BP Location: Left arm, Patient Position: Sitting, BP Cuff Size: Adult)   Pulse 76   Ht 1.68 m (5' 6.14\")   Wt 64.4 kg (142 lb)   LMP 08/25/2008   SpO2 98%   BMI 22.82 kg/m²   GENERAL: Well-developed, well-nourished in no apparent distress.   EYE:  No ocular asymmetry, PERRLA  HENT: Pink, moist mucous membranes.    NECK: No thyromegaly.   CARDIOVASCULAR:  No murmurs  LUNGS: Clear breath sounds  ABDOMEN: Soft, nontender   EXTREMITIES: No clubbing, cyanosis, or edema.   NEUROLOGICAL: No gross focal motor abnormalities   LYMPH: No cervical adenopathy palpated.   SKIN: No rashes, lesions.       Labs:  Lab Results   Component Value Date/Time    WBC 4.1 03/17/2022 09:00 PM    WBC 4.6 (L) 04/29/2021 08:00 AM    RBC 4.18 03/17/2022 09:00 PM    RBC 4.74 04/29/2021 08:00 AM    HEMOGLOBIN 13.2 03/17/2022 09:00 PM    HEMOGLOBIN 14.8 04/29/2021 08:00 AM    MCV 93 03/17/2022 09:00 PM    MCV 96.2 04/29/2021 08:00 AM    MCH 31.6 03/17/2022 09:00 PM    MCH 31.2 04/29/2021 08:00 AM    MCHC 33.9 03/17/2022 09:00 PM    MCHC 32.5 (L) 04/29/2021 08:00 AM    RDW 11.8 03/17/2022 09:00 PM    RDW 42.2 04/29/2021 08:00 AM    MPV 11.7 04/29/2021 08:00 AM       Lab Results   Component Value Date/Time    SODIUM 142 05/27/2022 09:53 AM    POTASSIUM 4.1 05/27/2022 09:53 AM    CHLORIDE 103 05/27/2022 09:53 AM    CO2 29 05/27/2022 09:53 AM    ANION 10.0 05/27/2022 09:53 AM    GLUCOSE 76 05/27/2022 09:53 AM    BUN 20 05/27/2022 09:53 AM    CREATININE 0.70 05/27/2022 09:53 AM    CALCIUM 10.0 05/27/2022 09:53 AM    ASTSGOT 29 05/27/2022 09:53 AM    ALTSGPT 24 05/27/2022 09:53 AM    TBILIRUBIN 0.5 05/27/2022 09:53 AM    ALBUMIN 5.0 (H) 05/27/2022 09:53 AM    ALBUMIN 4.82 05/27/2022 09:53 AM    TOTPROTEIN 8.1 05/27/2022 09:53 AM    TOTPROTEIN 7.8 05/27/2022 09:53 AM    GLOBULIN 3.1 " 05/27/2022 09:53 AM    AGRATIO 1.6 05/27/2022 09:53 AM       Lab Results   Component Value Date/Time    TSHULTRASEN 2.420 11/02/2021 0800     Lab Results   Component Value Date/Time    FREET4 1.27 11/02/2021 0800     No results found for: FREET3  No results found for: THYSTIMIG      Imaging:      ASSESSMENT/PLAN:     1. Multiple thyroid nodules  Stable low risk nodules  Recent ultrasound findings were discussed and reviewed with the patient  She is euthyroid and has no compressive symptoms  I plan to see her again next year with repeat ultrasound in the office    2. Vitamin D deficiency  Controlled   continue vitamin D3 supplementation   Continue monitoring      3. Osteopenia, unspecified location  Stable without fracture  Continue calcium vitamin D supplementation  Continue regular weightbearing exercise  We will schedule for bone density and update her on the results      Return in about 1 year (around 6/1/2023).      Thank you kindly for allowing me to participate in the thyroid care plan for this patient.    Bernabe Mann MD, [facility], [facility]  11/03/21    CC:   Linh Al M.D.

## 2022-06-07 DIAGNOSIS — E04.2 MULTIPLE THYROID NODULES: ICD-10-CM

## 2022-06-16 ENCOUNTER — HOSPITAL ENCOUNTER (OUTPATIENT)
Dept: RADIOLOGY | Facility: MEDICAL CENTER | Age: 62
End: 2022-06-16
Attending: INTERNAL MEDICINE
Payer: COMMERCIAL

## 2022-06-16 DIAGNOSIS — M85.80 OSTEOPENIA, UNSPECIFIED LOCATION: ICD-10-CM

## 2022-06-16 DIAGNOSIS — Z78.0 POST-MENOPAUSAL: ICD-10-CM

## 2022-06-16 PROCEDURE — 77080 DXA BONE DENSITY AXIAL: CPT

## 2022-08-15 ENCOUNTER — APPOINTMENT (RX ONLY)
Dept: URBAN - METROPOLITAN AREA CLINIC 35 | Facility: CLINIC | Age: 62
Setting detail: DERMATOLOGY
End: 2022-08-15

## 2022-08-15 DIAGNOSIS — L81.4 OTHER MELANIN HYPERPIGMENTATION: ICD-10-CM

## 2022-08-15 DIAGNOSIS — Z71.89 OTHER SPECIFIED COUNSELING: ICD-10-CM

## 2022-08-15 DIAGNOSIS — L82.1 OTHER SEBORRHEIC KERATOSIS: ICD-10-CM

## 2022-08-15 DIAGNOSIS — D22 MELANOCYTIC NEVI: ICD-10-CM

## 2022-08-15 PROBLEM — D22.72 MELANOCYTIC NEVI OF LEFT LOWER LIMB, INCLUDING HIP: Status: ACTIVE | Noted: 2022-08-15

## 2022-08-15 PROBLEM — D22.5 MELANOCYTIC NEVI OF TRUNK: Status: ACTIVE | Noted: 2022-08-15

## 2022-08-15 PROCEDURE — ? OBSERVATION AND MEASURE

## 2022-08-15 PROCEDURE — ? COUNSELING

## 2022-08-15 PROCEDURE — 99213 OFFICE O/P EST LOW 20 MIN: CPT

## 2022-08-15 ASSESSMENT — LOCATION SIMPLE DESCRIPTION DERM
LOCATION SIMPLE: RIGHT FOREARM
LOCATION SIMPLE: UPPER BACK
LOCATION SIMPLE: LEFT PRETIBIAL REGION
LOCATION SIMPLE: LEFT SHOULDER
LOCATION SIMPLE: RIGHT PRETIBIAL REGION
LOCATION SIMPLE: LEFT UPPER BACK
LOCATION SIMPLE: RIGHT SHOULDER
LOCATION SIMPLE: LEFT 2ND TOE
LOCATION SIMPLE: ABDOMEN
LOCATION SIMPLE: LEFT FOREARM

## 2022-08-15 ASSESSMENT — LOCATION ZONE DERM
LOCATION ZONE: TRUNK
LOCATION ZONE: LEG
LOCATION ZONE: TOE
LOCATION ZONE: ARM

## 2022-08-15 ASSESSMENT — LOCATION DETAILED DESCRIPTION DERM
LOCATION DETAILED: LEFT DISTAL PRETIBIAL REGION
LOCATION DETAILED: LEFT MEDIAL UPPER BACK
LOCATION DETAILED: LEFT LATERAL 2ND TOE
LOCATION DETAILED: INFERIOR THORACIC SPINE
LOCATION DETAILED: RIGHT PROXIMAL DORSAL FOREARM
LOCATION DETAILED: RIGHT DISTAL PRETIBIAL REGION
LOCATION DETAILED: RIGHT POSTERIOR SHOULDER
LOCATION DETAILED: LEFT POSTERIOR SHOULDER
LOCATION DETAILED: LEFT DISTAL DORSAL FOREARM
LOCATION DETAILED: EPIGASTRIC SKIN

## 2022-09-12 ENCOUNTER — PATIENT MESSAGE (OUTPATIENT)
Dept: INTERNAL MEDICINE | Facility: IMAGING CENTER | Age: 62
End: 2022-09-12
Payer: COMMERCIAL

## 2022-09-12 DIAGNOSIS — S49.92XA SHOULDER INJURY, LEFT, INITIAL ENCOUNTER: ICD-10-CM

## 2022-12-12 ENCOUNTER — PHARMACY VISIT (OUTPATIENT)
Dept: PHARMACY | Facility: MEDICAL CENTER | Age: 62
End: 2022-12-12
Payer: COMMERCIAL

## 2022-12-12 PROCEDURE — RXMED WILLOW AMBULATORY MEDICATION CHARGE: Performed by: INTERNAL MEDICINE

## 2022-12-13 SDOH — ECONOMIC STABILITY: TRANSPORTATION INSECURITY
IN THE PAST 12 MONTHS, HAS LACK OF TRANSPORTATION KEPT YOU FROM MEETINGS, WORK, OR FROM GETTING THINGS NEEDED FOR DAILY LIVING?: NO

## 2022-12-13 SDOH — ECONOMIC STABILITY: HOUSING INSECURITY
IN THE LAST 12 MONTHS, WAS THERE A TIME WHEN YOU DID NOT HAVE A STEADY PLACE TO SLEEP OR SLEPT IN A SHELTER (INCLUDING NOW)?: NO

## 2022-12-13 SDOH — ECONOMIC STABILITY: FOOD INSECURITY: WITHIN THE PAST 12 MONTHS, YOU WORRIED THAT YOUR FOOD WOULD RUN OUT BEFORE YOU GOT MONEY TO BUY MORE.: NEVER TRUE

## 2022-12-13 SDOH — ECONOMIC STABILITY: INCOME INSECURITY: HOW HARD IS IT FOR YOU TO PAY FOR THE VERY BASICS LIKE FOOD, HOUSING, MEDICAL CARE, AND HEATING?: NOT HARD AT ALL

## 2022-12-13 SDOH — HEALTH STABILITY: PHYSICAL HEALTH: ON AVERAGE, HOW MANY MINUTES DO YOU ENGAGE IN EXERCISE AT THIS LEVEL?: 60 MIN

## 2022-12-13 SDOH — ECONOMIC STABILITY: TRANSPORTATION INSECURITY
IN THE PAST 12 MONTHS, HAS THE LACK OF TRANSPORTATION KEPT YOU FROM MEDICAL APPOINTMENTS OR FROM GETTING MEDICATIONS?: NO

## 2022-12-13 SDOH — ECONOMIC STABILITY: FOOD INSECURITY: WITHIN THE PAST 12 MONTHS, THE FOOD YOU BOUGHT JUST DIDN'T LAST AND YOU DIDN'T HAVE MONEY TO GET MORE.: NEVER TRUE

## 2022-12-13 SDOH — ECONOMIC STABILITY: HOUSING INSECURITY

## 2022-12-13 SDOH — ECONOMIC STABILITY: INCOME INSECURITY: IN THE LAST 12 MONTHS, WAS THERE A TIME WHEN YOU WERE NOT ABLE TO PAY THE MORTGAGE OR RENT ON TIME?: NO

## 2022-12-13 SDOH — ECONOMIC STABILITY: TRANSPORTATION INSECURITY
IN THE PAST 12 MONTHS, HAS LACK OF RELIABLE TRANSPORTATION KEPT YOU FROM MEDICAL APPOINTMENTS, MEETINGS, WORK OR FROM GETTING THINGS NEEDED FOR DAILY LIVING?: NO

## 2022-12-13 SDOH — HEALTH STABILITY: PHYSICAL HEALTH: ON AVERAGE, HOW MANY DAYS PER WEEK DO YOU ENGAGE IN MODERATE TO STRENUOUS EXERCISE (LIKE A BRISK WALK)?: 6 DAYS

## 2022-12-13 SDOH — HEALTH STABILITY: MENTAL HEALTH
STRESS IS WHEN SOMEONE FEELS TENSE, NERVOUS, ANXIOUS, OR CAN'T SLEEP AT NIGHT BECAUSE THEIR MIND IS TROUBLED. HOW STRESSED ARE YOU?: NOT AT ALL

## 2022-12-13 ASSESSMENT — SOCIAL DETERMINANTS OF HEALTH (SDOH)
IN A TYPICAL WEEK, HOW MANY TIMES DO YOU TALK ON THE PHONE WITH FAMILY, FRIENDS, OR NEIGHBORS?: MORE THAN THREE TIMES A WEEK
HOW OFTEN DO YOU ATTENT MEETINGS OF THE CLUB OR ORGANIZATION YOU BELONG TO?: MORE THAN 4 TIMES PER YEAR
HOW OFTEN DO YOU ATTEND CHURCH OR RELIGIOUS SERVICES?: MORE THAN 4 TIMES PER YEAR
HOW OFTEN DO YOU ATTEND CHURCH OR RELIGIOUS SERVICES?: MORE THAN 4 TIMES PER YEAR
HOW MANY DRINKS CONTAINING ALCOHOL DO YOU HAVE ON A TYPICAL DAY WHEN YOU ARE DRINKING: 1 OR 2
HOW OFTEN DO YOU GET TOGETHER WITH FRIENDS OR RELATIVES?: MORE THAN THREE TIMES A WEEK
HOW OFTEN DO YOU GET TOGETHER WITH FRIENDS OR RELATIVES?: MORE THAN THREE TIMES A WEEK
HOW HARD IS IT FOR YOU TO PAY FOR THE VERY BASICS LIKE FOOD, HOUSING, MEDICAL CARE, AND HEATING?: NOT HARD AT ALL
WITHIN THE PAST 12 MONTHS, YOU WORRIED THAT YOUR FOOD WOULD RUN OUT BEFORE YOU GOT THE MONEY TO BUY MORE: NEVER TRUE
HOW OFTEN DO YOU ATTENT MEETINGS OF THE CLUB OR ORGANIZATION YOU BELONG TO?: MORE THAN 4 TIMES PER YEAR
HOW OFTEN DO YOU HAVE SIX OR MORE DRINKS ON ONE OCCASION: NEVER
IN A TYPICAL WEEK, HOW MANY TIMES DO YOU TALK ON THE PHONE WITH FAMILY, FRIENDS, OR NEIGHBORS?: MORE THAN THREE TIMES A WEEK
HOW OFTEN DO YOU HAVE A DRINK CONTAINING ALCOHOL: 4 OR MORE TIMES A WEEK

## 2022-12-13 ASSESSMENT — LIFESTYLE VARIABLES
HOW OFTEN DO YOU HAVE A DRINK CONTAINING ALCOHOL: 4 OR MORE TIMES A WEEK
HOW OFTEN DO YOU HAVE SIX OR MORE DRINKS ON ONE OCCASION: NEVER
HOW MANY STANDARD DRINKS CONTAINING ALCOHOL DO YOU HAVE ON A TYPICAL DAY: 1 OR 2
SKIP TO QUESTIONS 9-10: 1
AUDIT-C TOTAL SCORE: 4

## 2022-12-15 ENCOUNTER — OFFICE VISIT (OUTPATIENT)
Dept: MEDICAL GROUP | Facility: MEDICAL CENTER | Age: 62
End: 2022-12-15
Payer: COMMERCIAL

## 2022-12-15 VITALS
BODY MASS INDEX: 22.66 KG/M2 | DIASTOLIC BLOOD PRESSURE: 70 MMHG | HEART RATE: 70 BPM | TEMPERATURE: 97.4 F | HEIGHT: 66 IN | WEIGHT: 141 LBS | OXYGEN SATURATION: 98 % | RESPIRATION RATE: 16 BRPM | SYSTOLIC BLOOD PRESSURE: 112 MMHG

## 2022-12-15 DIAGNOSIS — E55.9 VITAMIN D DEFICIENCY: Chronic | ICD-10-CM

## 2022-12-15 DIAGNOSIS — N28.1 RENAL CYST, LEFT: ICD-10-CM

## 2022-12-15 DIAGNOSIS — H43.813 PVD (POSTERIOR VITREOUS DETACHMENT), BILATERAL: ICD-10-CM

## 2022-12-15 DIAGNOSIS — E04.2 MULTIPLE THYROID NODULES: ICD-10-CM

## 2022-12-15 DIAGNOSIS — Z00.00 HEALTHCARE MAINTENANCE: ICD-10-CM

## 2022-12-15 DIAGNOSIS — G43.109 MIGRAINE WITH AURA AND WITHOUT STATUS MIGRAINOSUS, NOT INTRACTABLE: ICD-10-CM

## 2022-12-15 DIAGNOSIS — E78.2 MIXED HYPERLIPIDEMIA: Chronic | ICD-10-CM

## 2022-12-15 DIAGNOSIS — M19.079 ARTHRITIS OF GREAT TOE AT METATARSOPHALANGEAL JOINT: ICD-10-CM

## 2022-12-15 PROCEDURE — 99204 OFFICE O/P NEW MOD 45 MIN: CPT | Performed by: STUDENT IN AN ORGANIZED HEALTH CARE EDUCATION/TRAINING PROGRAM

## 2022-12-15 ASSESSMENT — FIBROSIS 4 INDEX: FIB4 SCORE: 1.76

## 2022-12-15 ASSESSMENT — ENCOUNTER SYMPTOMS
FEVER: 0
SHORTNESS OF BREATH: 0
CHILLS: 0

## 2022-12-15 NOTE — PROGRESS NOTES
Subjective:     CC: Transferring Care     HPI:   Lily presents today for the following;    Problem   Multiple Thyroid Nodules    Found incidently on carotid US, patient had US to further evaluate and eventually had a biopsy that reported the nodules were benign. Patient is following with Dr. Johnathan Colbert (Posterior Vitreous Detachment), Bilateral    She follows with Dr. 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 She has completed dna testing and she reports was normal. She has renal US every odd year in the Spring      Migraine With Aura and Without Status Migrainosus, Not Intractable    Patient has flashing lights prior to her migraines. She reports her migraines have improved greatly over the past years. She does not take any specific medications at this time. Her migraines may be associated to pressure changes in the weather         Current Outpatient Medications Ordered in Epic   Medication Sig Dispense Refill    COVID-19mRNA Bival Vac Moderna (MODERNA COVID-19 BIVALENT) 50 MCG/0.5ML Suspension injection Inject  into the shoulder, thigh, or buttocks. 0.5 mL 0    atorvastatin (LIPITOR) 20 MG Tab TAKE 1 TABLET BY MOUTH EVERYDAY AT BEDTIME 90 Tablet 3    Multiple Vitamins-Minerals (WOMENS DAILY FORMULA PO) Take  by mouth.      Cholecalciferol (VITAMIN D) 2000 units Cap Take 2,000 Units by mouth every day.      aspirin EC (ECOTRIN) 81 MG Tablet Delayed Response Take 81 mg by mouth every day.       No current Epic-ordered  "facility-administered medications on file.           ROS:  Review of Systems   Constitutional:  Negative for chills and fever.   Respiratory:  Negative for shortness of breath.    Cardiovascular:  Negative for chest pain.     Objective:     Exam:  /70 (BP Location: Left arm, Patient Position: Sitting, BP Cuff Size: Adult)   Pulse 70   Temp 36.3 °C (97.4 °F) (Temporal)   Resp 16   Ht 1.68 m (5' 6.14\")   Wt 64 kg (141 lb)   LMP 08/25/2008   SpO2 98%   BMI 22.66 kg/m²  Body mass index is 22.66 kg/m².    Physical Exam  Constitutional:       General: She is not in acute distress.     Appearance: Normal appearance. She is not toxic-appearing.   Pulmonary:      Effort: Pulmonary effort is normal.   Musculoskeletal:      Cervical back: Normal range of motion.   Neurological:      Mental Status: She is alert.   Psychiatric:         Mood and Affect: Mood normal.         Behavior: Behavior normal.         Thought Content: Thought content normal.         Judgment: Judgment normal.             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    Migraine with aura and without status migrainosus, not intractable     Chronic-stable         Multiple thyroid nodules     Chronic-stable         PVD (posterior vitreous detachment), bilateral    Renal cyst, left     Chronic-stable  -recheck with US         Relevant Orders    US-RENAL     Other Visit Diagnoses       Healthcare maintenance        Relevant Orders    HEMOGLOBIN A1C    CBC WITH DIFFERENTIAL    Comp Metabolic Panel    TSH WITH REFLEX TO FT4    Lipid Profile    VITAMIN D,25 HYDROXY (DEFICIENCY)                  Please note that this dictation was created using voice recognition software. I have made every reasonable attempt to " correct obvious errors, but I expect that there are errors of grammar and possibly content that I did not discover before finalizing the note.

## 2023-01-19 DIAGNOSIS — E78.2 MIXED HYPERLIPIDEMIA: Chronic | ICD-10-CM

## 2023-01-19 RX ORDER — ATORVASTATIN CALCIUM 20 MG/1
TABLET, FILM COATED ORAL
Qty: 90 TABLET | Refills: 3 | Status: SHIPPED | OUTPATIENT
Start: 2023-01-19 | End: 2024-03-23 | Stop reason: SDUPTHER

## 2023-01-24 ENCOUNTER — HOSPITAL ENCOUNTER (OUTPATIENT)
Dept: RADIOLOGY | Facility: MEDICAL CENTER | Age: 63
End: 2023-01-24
Attending: STUDENT IN AN ORGANIZED HEALTH CARE EDUCATION/TRAINING PROGRAM
Payer: COMMERCIAL

## 2023-01-24 DIAGNOSIS — N28.1 RENAL CYST, LEFT: ICD-10-CM

## 2023-01-24 PROCEDURE — 76775 US EXAM ABDO BACK WALL LIM: CPT

## 2023-02-16 ENCOUNTER — HOSPITAL ENCOUNTER (OUTPATIENT)
Dept: LAB | Facility: MEDICAL CENTER | Age: 63
End: 2023-02-16
Attending: STUDENT IN AN ORGANIZED HEALTH CARE EDUCATION/TRAINING PROGRAM
Payer: COMMERCIAL

## 2023-02-16 DIAGNOSIS — Z00.00 HEALTHCARE MAINTENANCE: ICD-10-CM

## 2023-02-16 LAB
25(OH)D3 SERPL-MCNC: 55 NG/ML (ref 30–100)
ALBUMIN SERPL BCP-MCNC: 4.4 G/DL (ref 3.2–4.9)
ALBUMIN/GLOB SERPL: 1.8 G/DL
ALP SERPL-CCNC: 66 U/L (ref 30–99)
ALT SERPL-CCNC: 24 U/L (ref 2–50)
ANION GAP SERPL CALC-SCNC: 10 MMOL/L (ref 7–16)
AST SERPL-CCNC: 23 U/L (ref 12–45)
BASOPHILS # BLD AUTO: 0.7 % (ref 0–1.8)
BASOPHILS # BLD: 0.03 K/UL (ref 0–0.12)
BILIRUB SERPL-MCNC: 0.4 MG/DL (ref 0.1–1.5)
BUN SERPL-MCNC: 17 MG/DL (ref 8–22)
CALCIUM ALBUM COR SERPL-MCNC: 9.1 MG/DL (ref 8.5–10.5)
CALCIUM SERPL-MCNC: 9.4 MG/DL (ref 8.5–10.5)
CHLORIDE SERPL-SCNC: 106 MMOL/L (ref 96–112)
CHOLEST SERPL-MCNC: 194 MG/DL (ref 100–199)
CO2 SERPL-SCNC: 26 MMOL/L (ref 20–33)
CREAT SERPL-MCNC: 0.78 MG/DL (ref 0.5–1.4)
EOSINOPHIL # BLD AUTO: 0.13 K/UL (ref 0–0.51)
EOSINOPHIL NFR BLD: 2.9 % (ref 0–6.9)
ERYTHROCYTE [DISTWIDTH] IN BLOOD BY AUTOMATED COUNT: 43.3 FL (ref 35.9–50)
EST. AVERAGE GLUCOSE BLD GHB EST-MCNC: 108 MG/DL
FASTING STATUS PATIENT QL REPORTED: NORMAL
GFR SERPLBLD CREATININE-BSD FMLA CKD-EPI: 84 ML/MIN/1.73 M 2
GLOBULIN SER CALC-MCNC: 2.5 G/DL (ref 1.9–3.5)
GLUCOSE SERPL-MCNC: 99 MG/DL (ref 65–99)
HBA1C MFR BLD: 5.4 % (ref 4–5.6)
HCT VFR BLD AUTO: 42.1 % (ref 37–47)
HDLC SERPL-MCNC: 95 MG/DL
HGB BLD-MCNC: 13.7 G/DL (ref 12–16)
IMM GRANULOCYTES # BLD AUTO: 0.01 K/UL (ref 0–0.11)
IMM GRANULOCYTES NFR BLD AUTO: 0.2 % (ref 0–0.9)
LDLC SERPL CALC-MCNC: 87 MG/DL
LYMPHOCYTES # BLD AUTO: 1.66 K/UL (ref 1–4.8)
LYMPHOCYTES NFR BLD: 36.5 % (ref 22–41)
MCH RBC QN AUTO: 31.6 PG (ref 27–33)
MCHC RBC AUTO-ENTMCNC: 32.5 G/DL (ref 33.6–35)
MCV RBC AUTO: 97.2 FL (ref 81.4–97.8)
MONOCYTES # BLD AUTO: 0.33 K/UL (ref 0–0.85)
MONOCYTES NFR BLD AUTO: 7.3 % (ref 0–13.4)
NEUTROPHILS # BLD AUTO: 2.39 K/UL (ref 2–7.15)
NEUTROPHILS NFR BLD: 52.4 % (ref 44–72)
NRBC # BLD AUTO: 0 K/UL
NRBC BLD-RTO: 0 /100 WBC
PLATELET # BLD AUTO: 192 K/UL (ref 164–446)
PMV BLD AUTO: 11.3 FL (ref 9–12.9)
POTASSIUM SERPL-SCNC: 4.5 MMOL/L (ref 3.6–5.5)
PROT SERPL-MCNC: 6.9 G/DL (ref 6–8.2)
RBC # BLD AUTO: 4.33 M/UL (ref 4.2–5.4)
SODIUM SERPL-SCNC: 142 MMOL/L (ref 135–145)
TRIGL SERPL-MCNC: 60 MG/DL (ref 0–149)
TSH SERPL DL<=0.005 MIU/L-ACNC: 2.18 UIU/ML (ref 0.38–5.33)
WBC # BLD AUTO: 4.6 K/UL (ref 4.8–10.8)

## 2023-02-16 PROCEDURE — 82306 VITAMIN D 25 HYDROXY: CPT

## 2023-02-16 PROCEDURE — 36415 COLL VENOUS BLD VENIPUNCTURE: CPT

## 2023-02-16 PROCEDURE — 80053 COMPREHEN METABOLIC PANEL: CPT

## 2023-02-16 PROCEDURE — 85025 COMPLETE CBC W/AUTO DIFF WBC: CPT

## 2023-02-16 PROCEDURE — 80061 LIPID PANEL: CPT

## 2023-02-16 PROCEDURE — 83036 HEMOGLOBIN GLYCOSYLATED A1C: CPT

## 2023-02-16 PROCEDURE — 84443 ASSAY THYROID STIM HORMONE: CPT

## 2023-02-17 ENCOUNTER — APPOINTMENT (OUTPATIENT)
Dept: RADIOLOGY | Facility: MEDICAL CENTER | Age: 63
End: 2023-02-17
Attending: STUDENT IN AN ORGANIZED HEALTH CARE EDUCATION/TRAINING PROGRAM
Payer: COMMERCIAL

## 2023-02-17 DIAGNOSIS — Z12.31 VISIT FOR SCREENING MAMMOGRAM: ICD-10-CM

## 2023-02-17 PROCEDURE — 77067 SCR MAMMO BI INCL CAD: CPT

## 2023-02-21 ENCOUNTER — OFFICE VISIT (OUTPATIENT)
Dept: MEDICAL GROUP | Facility: MEDICAL CENTER | Age: 63
End: 2023-02-21
Payer: COMMERCIAL

## 2023-02-21 VITALS
TEMPERATURE: 97 F | HEART RATE: 59 BPM | BODY MASS INDEX: 22.98 KG/M2 | SYSTOLIC BLOOD PRESSURE: 118 MMHG | WEIGHT: 143 LBS | DIASTOLIC BLOOD PRESSURE: 60 MMHG | OXYGEN SATURATION: 96 % | HEIGHT: 66 IN

## 2023-02-21 DIAGNOSIS — Z23 NEED FOR VACCINATION: ICD-10-CM

## 2023-02-21 DIAGNOSIS — E78.2 MIXED HYPERLIPIDEMIA: Chronic | ICD-10-CM

## 2023-02-21 DIAGNOSIS — N28.1 RENAL CYST, LEFT: ICD-10-CM

## 2023-02-21 PROCEDURE — 90746 HEPB VACCINE 3 DOSE ADULT IM: CPT | Performed by: STUDENT IN AN ORGANIZED HEALTH CARE EDUCATION/TRAINING PROGRAM

## 2023-02-21 PROCEDURE — 90471 IMMUNIZATION ADMIN: CPT | Performed by: STUDENT IN AN ORGANIZED HEALTH CARE EDUCATION/TRAINING PROGRAM

## 2023-02-21 PROCEDURE — 99214 OFFICE O/P EST MOD 30 MIN: CPT | Mod: 25 | Performed by: STUDENT IN AN ORGANIZED HEALTH CARE EDUCATION/TRAINING PROGRAM

## 2023-02-21 ASSESSMENT — ENCOUNTER SYMPTOMS
FEVER: 0
CHILLS: 0
SHORTNESS OF BREATH: 0

## 2023-02-21 ASSESSMENT — FIBROSIS 4 INDEX: FIB4 SCORE: 1.56

## 2023-02-21 NOTE — PROGRESS NOTES
"Subjective:     CC: labs     HPI:   Lily presents today for the following;    Problem   Renal Cyst, Left    Patient has a reported simple cyst on the left kidney but does have family history of kidney cancer in her father and grandfather. She has completed dna testing and she reports was normal. She has renal US every odd year in the Spring     2/21/2023  -January US was fairly unremarkable  -patient would like to continue some follow up         Current Outpatient Medications Ordered in Epic   Medication Sig Dispense Refill    atorvastatin (LIPITOR) 20 MG Tab TAKE 1 TABLET BY MOUTH ONCE DAILY AT BEDTIME 90 Tablet 3    COVID-19mRNA Bival Vac Moderna (MODERNA COVID-19 BIVALENT) 50 MCG/0.5ML Suspension injection Inject  into the shoulder, thigh, or buttocks. 0.5 mL 0    Multiple Vitamins-Minerals (WOMENS DAILY FORMULA PO) Take  by mouth.      Cholecalciferol (VITAMIN D) 2000 units Cap Take 2,000 Units by mouth every day.      aspirin EC (ECOTRIN) 81 MG Tablet Delayed Response Take 81 mg by mouth every day.       No current [facility]-ordered facility-administered medications on file.           ROS:  Review of Systems   Constitutional:  Negative for chills and fever.   Respiratory:  Negative for shortness of breath.    Cardiovascular:  Negative for chest pain.     Objective:     Exam:  /60 (BP Location: Left arm, Patient Position: Sitting, BP Cuff Size: Adult)   Pulse (!) 59   Temp 36.1 °C (97 °F) (Temporal)   Ht 1.68 m (5' 6.14\")   Wt 64.9 kg (143 lb)   LMP 08/25/2008   SpO2 96%   BMI 22.98 kg/m²  Body mass index is 22.98 kg/m².    Physical Exam  Constitutional:       General: She is not in acute distress.     Appearance: She is not ill-appearing.   Pulmonary:      Effort: Pulmonary effort is normal.   Neurological:      Mental Status: She is alert.   Psychiatric:         Mood and Affect: Mood normal.         Behavior: Behavior normal.         Thought Content: Thought content normal.         Judgment: Judgment " normal.             Assessment & Plan:     Problem List Items Addressed This Visit       Renal cyst, left     Chronic  -consider repeat kidney US in 2028          Other Visit Diagnoses       Need for vaccination        Relevant Orders    Hep B Adult 20+            3+ labs reviewed       Return in about 6 months (around 8/21/2023) for Annual.    Please note that this dictation was created using voice recognition software. I have made every reasonable attempt to correct obvious errors, but I expect that there are errors of grammar and possibly content that I did not discover before finalizing the note.

## 2023-04-24 ENCOUNTER — OFFICE VISIT (OUTPATIENT)
Dept: OPHTHALMOLOGY | Facility: MEDICAL CENTER | Age: 63
End: 2023-04-24
Payer: COMMERCIAL

## 2023-04-24 DIAGNOSIS — H25.13 AGE-RELATED NUCLEAR CATARACT OF BOTH EYES: ICD-10-CM

## 2023-04-24 DIAGNOSIS — H40.003 GLAUCOMA SUSPECT OF BOTH EYES: ICD-10-CM

## 2023-04-24 DIAGNOSIS — H02.834 DERMATOCHALASIS OF LEFT UPPER EYELID: ICD-10-CM

## 2023-04-24 DIAGNOSIS — H52.13 MYOPIA OF BOTH EYES: ICD-10-CM

## 2023-04-24 PROCEDURE — 99213 OFFICE O/P EST LOW 20 MIN: CPT | Performed by: OPHTHALMOLOGY

## 2023-04-24 ASSESSMENT — REFRACTION_MANIFEST
OD_AXIS: 084
OS_CYLINDER: +1.25
OS_AXIS: 099
OS_SPHERE: -1.75
OD_SPHERE: -1.50
METHOD_AUTOREFRACTION: 1
OD_CYLINDER: +2.00

## 2023-04-24 ASSESSMENT — REFRACTION_WEARINGRX
OD_AXIS: 087
OD_SPHERE: -2.00
OD_CYLINDER: +2.00
OD_SPHERE: -2.00
OD_AXIS: 085
OS_CYLINDER: +1.50
OS_ADD: +2.50
OD_CYLINDER: +2.00
OS_SPHERE: -2.75
OS_ADD: +2.25
OS_AXIS: 090
OD_ADD: +2.50
OD_ADD: +2.50
SPECS_TYPE: TRIFOCAL
OS_SPHERE: -2.25
SPECS_TYPE: TRIFOCAL
OS_CYLINDER: +1.75
OS_AXIS: 081

## 2023-04-24 ASSESSMENT — TONOMETRY
IOP_METHOD: APPLANATION
OS_IOP_MMHG: 16
OD_IOP_MMHG: 18
OD_IOP_MMHG: 16
OS_IOP_MMHG: 16

## 2023-04-24 ASSESSMENT — CONF VISUAL FIELD
OD_SUPERIOR_TEMPORAL_RESTRICTION: 0
OS_INFERIOR_NASAL_RESTRICTION: 0
OS_NORMAL: 1
OD_INFERIOR_TEMPORAL_RESTRICTION: 0
OD_INFERIOR_NASAL_RESTRICTION: 0
OS_INFERIOR_TEMPORAL_RESTRICTION: 0
OD_NORMAL: 1
OD_SUPERIOR_NASAL_RESTRICTION: 0
OS_SUPERIOR_TEMPORAL_RESTRICTION: 0
OS_SUPERIOR_NASAL_RESTRICTION: 0

## 2023-04-24 ASSESSMENT — PACHYMETRY
OD_CT(UM): 16
OS_CT(UM): 16

## 2023-04-24 ASSESSMENT — EXTERNAL EXAM - RIGHT EYE: OD_EXAM: NORMAL

## 2023-04-24 ASSESSMENT — SLIT LAMP EXAM - LIDS
COMMENTS: PTOSIS, DERMATOCHALASIS - UPPER LID
COMMENTS: NORMAL

## 2023-04-24 ASSESSMENT — VISUAL ACUITY
METHOD: SNELLEN - LINEAR
OD_CC: J1+
OS_CC: J1+
OS_CC: 20/25-1
CORRECTION_TYPE: GLASSES
OD_CC: 20/20-2

## 2023-04-24 ASSESSMENT — CUP TO DISC RATIO
OS_RATIO: 0.2
OD_RATIO: 0.2

## 2023-04-24 ASSESSMENT — EXTERNAL EXAM - LEFT EYE: OS_EXAM: NORMAL

## 2023-04-24 NOTE — ASSESSMENT & PLAN NOTE
2/8/2021 - early ns cataracts. No visually signficant  4/25/2022 - slight progression  4/24/2023 -no significant progression since last year

## 2023-04-24 NOTE — PROGRESS NOTES
Peds/Neuro Ophthalmology:   Daniel Tatum M.D.    Date & Time note created:    4/24/2023   10:11 AM     Referring MD / APRN:  Felix Campos D.O., No att. providers found    Patient ID:  Name:             Lily Sawant   YOB: 1960  Age:                 63 y.o.  female   MRN:               [id number]    Chief Complaint/Reason for Visit:     Other (Post vitreous detachment)      History of Present Illness:    Lily Sawant is a 63 y.o. female   Follow up 1 year for PVD with stable flashes and floaters.Patient has scratches on glasses and needs a new RX.      Review of Systems:  Review of Systems   Eyes:         PVD     Past Medical History:   Past Medical History:   Diagnosis Date    Celiac disease 2/13/2015    Family history of ischemic heart disease (IHD)     FH: renal cell carcinoma 2/13/2015    Hyperlipidemia 2/13/2015    Migraine headache 2/13/2015    Thyroid disease     nodules       Past Surgical History:  Past Surgical History:   Procedure Laterality Date    DILATION AND EVACUATION  1985 & 1987    TONSILLECTOMY         Current Outpatient Medications:  Current Outpatient Medications   Medication Sig Dispense Refill    atorvastatin (LIPITOR) 20 MG Tab TAKE 1 TABLET BY MOUTH ONCE DAILY AT BEDTIME 90 Tablet 3    COVID-19mRNA Bival Vac Moderna (MODERNA COVID-19 BIVALENT) 50 MCG/0.5ML Suspension injection Inject  into the shoulder, thigh, or buttocks. 0.5 mL 0    Multiple Vitamins-Minerals (WOMENS DAILY FORMULA PO) Take  by mouth.      Cholecalciferol (VITAMIN D) 2000 units Cap Take 2,000 Units by mouth every day.      aspirin EC (ECOTRIN) 81 MG Tablet Delayed Response Take 81 mg by mouth every day.       No current facility-administered medications for this visit.       Allergies:  Allergies   Allergen Reactions    Gluten Meal Diarrhea    Pineapple Diarrhea       Family History:  Family History   Problem Relation Age of Onset    Arthritis Mother     Glasses Mother     Other  Mother     Heart Disease Mother     Cancer Sister         Melanoma    Other Sister     Cancer Father         Renal & Lung    Lung Disease Father     Glasses Father     Cancer Brother         Melanoma - eye    Other Brother     Glaucoma Brother     Glasses Child     Other Child         SIDS    Glasses Child     Glasses Child     Cancer Paternal Grandfather         Renal    Heart Disease Maternal Grandmother     Heart Attack Maternal Uncle 65        MI       Social History:  Social History     Socioeconomic History    Marital status:      Spouse name: Not on file    Number of children: Not on file    Years of education: Not on file    Highest education level: Master's degree (e.g., MA, MS, [employer]efty, MEd, MSW, GARY)   Occupational History    Not on file   Tobacco Use    Smoking status: Former     Packs/day: 0.25     Years: 3.00     Pack years: 0.75     Types: Cigarettes     Quit date: 1978     Years since quittin.2    Smokeless tobacco: Never   Vaping Use    Vaping Use: Never used   Substance and Sexual Activity    Alcohol use: Yes     Alcohol/week: 2.4 oz     Types: 4 Glasses of wine per week    Drug use: No    Sexual activity: Yes     Partners: Male   Other Topics Concern    Not on file   Social History Narrative    , has 3 kids     works part time      Social Determinants of Health     Financial Resource Strain: Low Risk     Difficulty of Paying Living Expenses: Not hard at all   Food Insecurity: No Food Insecurity    Worried About Running Out of Food in the Last Year: Never true    Ran Out of Food in the Last Year: Never true   Transportation Needs: No Transportation Needs    Lack of Transportation (Medical): No    Lack of Transportation (Non-Medical): No   Physical Activity: Sufficiently Active    Days of Exercise per Week: 6 days    Minutes of Exercise per Session: 60 min   Stress: No Stress Concern Present    Feeling of Stress : Not at all   Social Connections: Socially Integrated    Frequency  of Communication with Friends and Family: More than three times a week    Frequency of Social Gatherings with Friends and Family: More than three times a week    Attends Yarsani Services: More than 4 times per year    Active Member of Clubs or Organizations: Not on file    Attends Club or Organization Meetings: More than 4 times per year    Marital Status:    Intimate Partner Violence: Not on file   Housing Stability: Unknown    Unable to Pay for Housing in the Last Year: No    Number of Places Lived in the Last Year: Not on file    Unstable Housing in the Last Year: No          Physical Exam:  Physical Exam    Oriented x 3  Weight/BMI: There is no height or weight on file to calculate BMI.  There were no vitals taken for this visit.    Base Eye Exam       Visual Acuity (Snellen - Linear)         Right Left    Dist cc 20/20-2 20/25-1    Near cc J1+ J1+      Correction: Glasses              Tonometry (i care, 8:41 AM)         Right Left    Pressure 18 16              Tonometry #2 (Applanation, 8:59 AM)         Right Left    Pressure 16 16              Pupils         Pupils    Right PERRL    Left PERRL              Visual Fields         Right Left     Full Full              Extraocular Movement         Right Left     Full, Ortho Full, Ortho              Neuro/Psych       Oriented x3: Yes    Mood/Affect: Normal                  Additional Tests       Color         Right Left    Ishihara 9/9 9/9              Stereo       Fly: +    Animals: 3/3    Circles: 9/9                  Slit Lamp and Fundus Exam       External Exam         Right Left    External Normal Normal              Slit Lamp Exam         Right Left    Lids/Lashes Normal Ptosis, Dermatochalasis - upper lid    Conjunctiva/Sclera White and quiet White and quiet    Cornea Clear Clear    Anterior Chamber Deep and quiet Deep and quiet    Iris Round and reactive Round and reactive    Lens Nuclear sclerosis Nuclear sclerosis    Vitreous Normal Normal               Fundus Exam         Right Left    Disc Normal Normal    C/D Ratio 0.2 0.2    Macula Normal Normal    Vessels Normal Normal    Periphery PVD, Pigmentation PVD, Pigmentation                  Refraction       Wearing Rx         Sphere Cylinder Axis Add    Right -2.00 +2.00 087 +2.50    Left -2.75 +1.75 081 +2.25      Age: 2yrs    Type: Trifocal              Wearing Rx #2         Sphere Cylinder Axis Add    Right -2.00 +2.00 085 +2.50    Left -2.25 +1.50 090 +2.50      Type: Trifocal              Manifest Refraction (Auto)         Sphere Cylinder Axis    Right -1.50 +2.00 084    Left -1.75 +1.25 099              Final Rx         Sphere Cylinder Axis Add    Right -2.00 +2.00 085 +2.75    Left -2.25 +1.50 090 +2.75      Type: Trifocal                    Pertinent Lab/Test/Imaging Review:      Assessment and Plan:     Age-related nuclear cataract of both eyes  2/8/2021 - early ns cataracts. No visually signficant  4/25/2022 - slight progression  4/24/2023 -no significant progression since last year    Glaucoma suspect of both eyes  2/8/2021 - OCT NFL thickness normal at 80 OD and 79 OS, stable  4/25/2022 - Ave NFl thickness stable at 79 OD and 76 OS, with tilting.   4/24/2023 -IOP normal    Myopia of both eyes  4/25/2022 - No change in rx  4/24/2023 -never got the new Rx.  No change.  Will give Rx.  Slight increase in bifocal.    Dermatochalasis of left upper eyelid  2/8/2021 - mild left upper lid dermatochalasis leading to some ptosis. No obscuring visual axis.   4/25/2022 - gave name of Daniel Wil  4/24/2023--overall stable        Daniel Tatum M.D.

## 2023-04-24 NOTE — ASSESSMENT & PLAN NOTE
2/8/2021 - OCT NFL thickness normal at 80 OD and 79 OS, stable  4/25/2022 - Ave NFl thickness stable at 79 OD and 76 OS, with tilting.   4/24/2023 -IOP normal

## 2023-04-24 NOTE — ASSESSMENT & PLAN NOTE
2/8/2021 - mild left upper lid dermatochalasis leading to some ptosis. No obscuring visual axis.   4/25/2022 - gave name of Daniel De La Torre  4/24/2023--overall stable

## 2023-04-24 NOTE — ASSESSMENT & PLAN NOTE
4/25/2022 - No change in rx  4/24/2023 -never got the new Rx.  No change.  Will give Rx.  Slight increase in bifocal.

## 2023-06-07 ENCOUNTER — OFFICE VISIT (OUTPATIENT)
Dept: MEDICAL GROUP | Facility: LAB | Age: 63
End: 2023-06-07
Payer: COMMERCIAL

## 2023-06-07 VITALS
OXYGEN SATURATION: 96 % | TEMPERATURE: 98.3 F | SYSTOLIC BLOOD PRESSURE: 112 MMHG | RESPIRATION RATE: 14 BRPM | HEIGHT: 66 IN | BODY MASS INDEX: 22.73 KG/M2 | WEIGHT: 141.4 LBS | HEART RATE: 68 BPM | DIASTOLIC BLOOD PRESSURE: 64 MMHG

## 2023-06-07 DIAGNOSIS — N93.9 VAGINAL BLEEDING: ICD-10-CM

## 2023-06-07 PROCEDURE — 99213 OFFICE O/P EST LOW 20 MIN: CPT | Performed by: STUDENT IN AN ORGANIZED HEALTH CARE EDUCATION/TRAINING PROGRAM

## 2023-06-07 PROCEDURE — 3078F DIAST BP <80 MM HG: CPT | Performed by: STUDENT IN AN ORGANIZED HEALTH CARE EDUCATION/TRAINING PROGRAM

## 2023-06-07 PROCEDURE — 3074F SYST BP LT 130 MM HG: CPT | Performed by: STUDENT IN AN ORGANIZED HEALTH CARE EDUCATION/TRAINING PROGRAM

## 2023-06-07 ASSESSMENT — ENCOUNTER SYMPTOMS
FEVER: 0
SHORTNESS OF BREATH: 0
CHILLS: 0

## 2023-06-07 ASSESSMENT — FIBROSIS 4 INDEX: FIB4 SCORE: 1.59

## 2023-06-07 NOTE — PROGRESS NOTES
"Subjective:     CC: vaginal spotting     HPI:   Lily presents today for the following;    Problem   Vaginal Bleeding    June 2, vaginal spotting. No fevers, weight loss. No other vaginal discharge. Has never occurred prior. Cervical cancer screening uptodate          Current Outpatient Medications Ordered in Epic   Medication Sig Dispense Refill    atorvastatin (LIPITOR) 20 MG Tab TAKE 1 TABLET BY MOUTH ONCE DAILY AT BEDTIME 90 Tablet 3    COVID-19mRNA Bival Vac Moderna (MODERNA COVID-19 BIVALENT) 50 MCG/0.5ML Suspension injection Inject  into the shoulder, thigh, or buttocks. 0.5 mL 0    Multiple Vitamins-Minerals (WOMENS DAILY FORMULA PO) Take  by mouth.      Cholecalciferol (VITAMIN D) 2000 units Cap Take 2,000 Units by mouth every day.      aspirin EC (ECOTRIN) 81 MG Tablet Delayed Response Take 81 mg by mouth every day.       No current [facility]-ordered facility-administered medications on file.           ROS:  Review of Systems   Constitutional:  Negative for chills and fever.   Respiratory:  Negative for shortness of breath.    Cardiovascular:  Negative for chest pain.       Objective:     Exam:  /64 (BP Location: Right arm, Patient Position: Sitting, BP Cuff Size: Adult)   Pulse 68   Temp 36.8 °C (98.3 °F)   Resp 14   Ht 1.68 m (5' 6.14\")   Wt 64.1 kg (141 lb 6.4 oz)   LMP 08/25/2008   SpO2 96%   BMI 22.73 kg/m²  Body mass index is 22.73 kg/m².    Physical Exam  Constitutional:       General: She is not in acute distress.     Appearance: She is not ill-appearing.   Pulmonary:      Effort: Pulmonary effort is normal.   Neurological:      Mental Status: She is alert.   Psychiatric:         Mood and Affect: Mood normal.         Behavior: Behavior normal.         Thought Content: Thought content normal.         Judgment: Judgment normal.               Assessment & Plan:     Problem List Items Addressed This Visit       Vaginal bleeding     Acute  -check TVUS         Relevant Orders    US-PELVIC " COMPLETE (TRANSABDOMINAL/TRANSVAGINAL) (COMBO)     Review previous pap         Please note that this dictation was created using voice recognition software. I have made every reasonable attempt to correct obvious errors, but I expect that there are errors of grammar and possibly content that I did not discover before finalizing the note.

## 2023-06-17 DIAGNOSIS — E55.9 VITAMIN D DEFICIENCY: ICD-10-CM

## 2023-06-17 DIAGNOSIS — E04.2 MULTIPLE THYROID NODULES: ICD-10-CM

## 2023-06-17 DIAGNOSIS — M85.80 OSTEOPENIA, UNSPECIFIED LOCATION: ICD-10-CM

## 2023-06-20 ENCOUNTER — TELEPHONE (OUTPATIENT)
Dept: ENDOCRINOLOGY | Facility: MEDICAL CENTER | Age: 63
End: 2023-06-20
Payer: COMMERCIAL

## 2023-06-20 NOTE — TELEPHONE ENCOUNTER
Left detailed voicemail in regards to outstanding labs that still needs to be completed prior to upcoming appointment.

## 2023-07-06 ENCOUNTER — HOSPITAL ENCOUNTER (OUTPATIENT)
Dept: RADIOLOGY | Facility: MEDICAL CENTER | Age: 63
End: 2023-07-06
Attending: STUDENT IN AN ORGANIZED HEALTH CARE EDUCATION/TRAINING PROGRAM
Payer: COMMERCIAL

## 2023-07-06 DIAGNOSIS — N93.9 VAGINAL BLEEDING: ICD-10-CM

## 2023-07-06 PROCEDURE — 76830 TRANSVAGINAL US NON-OB: CPT

## 2023-07-07 DIAGNOSIS — R93.89 ENDOMETRIAL THICKENING ON ULTRASOUND: ICD-10-CM

## 2023-07-14 ENCOUNTER — HOSPITAL ENCOUNTER (OUTPATIENT)
Dept: LAB | Facility: MEDICAL CENTER | Age: 63
End: 2023-07-14
Attending: INTERNAL MEDICINE
Payer: COMMERCIAL

## 2023-07-14 DIAGNOSIS — E55.9 VITAMIN D DEFICIENCY: ICD-10-CM

## 2023-07-14 DIAGNOSIS — E04.2 MULTIPLE THYROID NODULES: ICD-10-CM

## 2023-07-14 DIAGNOSIS — M85.80 OSTEOPENIA, UNSPECIFIED LOCATION: ICD-10-CM

## 2023-07-14 LAB
25(OH)D3 SERPL-MCNC: 60 NG/ML (ref 30–100)
ALBUMIN SERPL BCP-MCNC: 4.8 G/DL (ref 3.2–4.9)
ALBUMIN/GLOB SERPL: 2.1 G/DL
ALP SERPL-CCNC: 57 U/L (ref 30–99)
ALT SERPL-CCNC: 19 U/L (ref 2–50)
ANION GAP SERPL CALC-SCNC: 10 MMOL/L (ref 7–16)
AST SERPL-CCNC: 20 U/L (ref 12–45)
BILIRUB SERPL-MCNC: 0.7 MG/DL (ref 0.1–1.5)
BUN SERPL-MCNC: 16 MG/DL (ref 8–22)
CALCIUM ALBUM COR SERPL-MCNC: 9.3 MG/DL (ref 8.5–10.5)
CALCIUM SERPL-MCNC: 9.9 MG/DL (ref 8.5–10.5)
CHLORIDE SERPL-SCNC: 99 MMOL/L (ref 96–112)
CO2 SERPL-SCNC: 28 MMOL/L (ref 20–33)
CREAT SERPL-MCNC: 0.76 MG/DL (ref 0.5–1.4)
GFR SERPLBLD CREATININE-BSD FMLA CKD-EPI: 87 ML/MIN/1.73 M 2
GLOBULIN SER CALC-MCNC: 2.3 G/DL (ref 1.9–3.5)
GLUCOSE SERPL-MCNC: 99 MG/DL (ref 65–99)
PHOSPHATE SERPL-MCNC: 3.3 MG/DL (ref 2.5–4.5)
POTASSIUM SERPL-SCNC: 3.5 MMOL/L (ref 3.6–5.5)
PROT SERPL-MCNC: 7.1 G/DL (ref 6–8.2)
PTH-INTACT SERPL-MCNC: 22.9 PG/ML (ref 14–72)
SODIUM SERPL-SCNC: 137 MMOL/L (ref 135–145)
T4 FREE SERPL-MCNC: 1.26 NG/DL (ref 0.93–1.7)
TSH SERPL DL<=0.005 MIU/L-ACNC: 1.38 UIU/ML (ref 0.38–5.33)

## 2023-07-14 PROCEDURE — 36415 COLL VENOUS BLD VENIPUNCTURE: CPT

## 2023-07-14 PROCEDURE — 84443 ASSAY THYROID STIM HORMONE: CPT

## 2023-07-14 PROCEDURE — 84439 ASSAY OF FREE THYROXINE: CPT

## 2023-07-14 PROCEDURE — 84100 ASSAY OF PHOSPHORUS: CPT

## 2023-07-14 PROCEDURE — 82306 VITAMIN D 25 HYDROXY: CPT

## 2023-07-14 PROCEDURE — 83970 ASSAY OF PARATHORMONE: CPT

## 2023-07-14 PROCEDURE — 80053 COMPREHEN METABOLIC PANEL: CPT

## 2023-08-01 ENCOUNTER — OFFICE VISIT (OUTPATIENT)
Dept: ENDOCRINOLOGY | Facility: MEDICAL CENTER | Age: 63
End: 2023-08-01
Attending: INTERNAL MEDICINE
Payer: COMMERCIAL

## 2023-08-01 ENCOUNTER — GYNECOLOGY VISIT (OUTPATIENT)
Dept: OBGYN | Facility: CLINIC | Age: 63
End: 2023-08-01
Payer: COMMERCIAL

## 2023-08-01 VITALS
OXYGEN SATURATION: 100 % | BODY MASS INDEX: 22.68 KG/M2 | WEIGHT: 141.1 LBS | HEIGHT: 66 IN | HEART RATE: 70 BPM | SYSTOLIC BLOOD PRESSURE: 102 MMHG | RESPIRATION RATE: 20 BRPM | DIASTOLIC BLOOD PRESSURE: 60 MMHG

## 2023-08-01 VITALS — WEIGHT: 142 LBS | DIASTOLIC BLOOD PRESSURE: 75 MMHG | SYSTOLIC BLOOD PRESSURE: 125 MMHG | BODY MASS INDEX: 22.82 KG/M2

## 2023-08-01 DIAGNOSIS — E04.2 MULTIPLE THYROID NODULES: ICD-10-CM

## 2023-08-01 DIAGNOSIS — N84.0 ENDOMETRIAL POLYP: ICD-10-CM

## 2023-08-01 DIAGNOSIS — M85.80 OSTEOPENIA, UNSPECIFIED LOCATION: ICD-10-CM

## 2023-08-01 DIAGNOSIS — N95.0 POSTMENOPAUSAL BLEEDING: Primary | ICD-10-CM

## 2023-08-01 DIAGNOSIS — E55.9 VITAMIN D DEFICIENCY: ICD-10-CM

## 2023-08-01 PROCEDURE — 99214 OFFICE O/P EST MOD 30 MIN: CPT | Performed by: INTERNAL MEDICINE

## 2023-08-01 PROCEDURE — 99211 OFF/OP EST MAY X REQ PHY/QHP: CPT | Performed by: INTERNAL MEDICINE

## 2023-08-01 PROCEDURE — 99203 OFFICE O/P NEW LOW 30 MIN: CPT | Performed by: OBSTETRICS & GYNECOLOGY

## 2023-08-01 PROCEDURE — 3078F DIAST BP <80 MM HG: CPT | Performed by: OBSTETRICS & GYNECOLOGY

## 2023-08-01 PROCEDURE — 3074F SYST BP LT 130 MM HG: CPT | Performed by: INTERNAL MEDICINE

## 2023-08-01 PROCEDURE — 3078F DIAST BP <80 MM HG: CPT | Performed by: INTERNAL MEDICINE

## 2023-08-01 PROCEDURE — 3074F SYST BP LT 130 MM HG: CPT | Performed by: OBSTETRICS & GYNECOLOGY

## 2023-08-01 ASSESSMENT — FIBROSIS 4 INDEX
FIB4 SCORE: 1.55
FIB4 SCORE: 1.55

## 2023-08-01 NOTE — PROGRESS NOTES
Chief Complaint: Follow up of Thyroid Nodules    HPI:     Lily Sawant is a 63 y.o. female with history of Thyroid Nodules is here for follow up evaluation.     Her comorbid issues include celiac disease, hyperlipidemia, renal cyst and osteopenia with low fracture risk based on FRAX    She was incidentally found to have thyroid nodules on carotid ultrasound done in 2021.    She denies a family history of thyroid cancer and denies radiation exposure.    Formal ultrasound on May 2021 showed a dominant 2 cm isoechoic solid nodule on the right lower lobe which was biopsied and proven benign on June 1, 2021.   She also had a low risk 1.3 cm complex cyst with internal septations on the right upper lobe         On follow-up she is doing well  She denies dysphagia hoarseness and dyspnea  She denies interval falls and fractures      She recently had an ultrasound in the office on May 27, 2022 which showed stability of the previously detected nodules on the right upper lobe and right lower lobe      Her most recent thyroid labs are normal   Latest Reference Range & Units 07/14/23 12:05   TSH 0.380 - 5.330 uIU/mL 1.380   Free T-4 0.93 - 1.70 ng/dL 1.26         Vitamin D is 60  Calcium was 9.9  Serum creatinine is 0.70      She does have osteopenia diagnosed on her bone density on April 28, 2021 with lowest T score of -2.1 for left hip  Follow-up bone density on June 2, 2022 showed stable osteopenia with lowest T score of -0.9 for the left hip.  Her FRAX score was elevated.  She is currently not on antiresorptive agent therapy.  She is taking daily multivitamins.  She has parental history of hip fracture  She denies personal history of fragility fractures              Patient's medications, allergies, and social histories were reviewed and updated as appropriate.      ROS:      CONS:     No fever, no chills   EYES:     No diplopia, no blurry vision   CV:           No chest pain, no palpitations   PULM:     No SOB, no  cough, no hemoptysis.   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  2016: Elevated Lp(a)  2016: Vitamin D deficiency  2016: Mixed hyperlipidemia  2016: Atherosclerosis of both carotid arteries, mild per 2016   Ultrasound  2016: Postmenopausal  2016: Renal cyst, left  2016: Right foot pain  2016: Onychomycosis of left great toe  2015: Right shoulder pain  2015: Migraine with aura and without status migrainosus, not   intractable  2015: Depression  2015: Celiac disease  2015: FH: renal cell carcinoma  2015: Family history of malignant melanoma  2015: Hyperlipidemia  Family history of ischemic heart disease (IHD)      Past Surgical History:  Past Surgical History:   Procedure Laterality Date    DILATION AND EVACUATION   &     TONSILLECTOMY          Allergies:  Gluten meal and Pineapple     Social History:  Social History     Tobacco Use    Smoking status: Former     Packs/day: 0.25     Years: 3.00     Pack years: 0.75     Types: Cigarettes     Quit date: 1978     Years since quittin.4    Smokeless tobacco: Never   Vaping Use    Vaping Use: Never used   Substance Use Topics    Alcohol use: Yes     Alcohol/week: 2.4 oz     Types: 4 Glasses of wine per week    Drug use: No        Family History:   family history includes Arthritis in her mother; Cancer in her brother,  "father, paternal grandfather, and sister; Glasses in her child, child, child, father, and mother; Glaucoma in her brother; Heart Attack (age of onset: 65) in her maternal uncle; Heart Disease in her maternal grandmother and mother; Lung Disease in her father; Other in her brother, child, mother, and sister.      PHYSICAL EXAM:   Vital signs: /60 (BP Location: Left arm, Patient Position: Sitting, BP Cuff Size: Adult)   Pulse 70   Resp 20   Ht 1.68 m (5' 6.14\")   Wt 64 kg (141 lb 1.6 oz)   LMP 08/25/2008   SpO2 100%   BMI 22.68 kg/m²   GENERAL: Well-developed, well-nourished in no apparent distress.   EYE:  No ocular asymmetry, PERRLA  HENT: Pink, moist mucous membranes.    NECK: No thyromegaly.   CARDIOVASCULAR:  No murmurs  LUNGS: Clear breath sounds  ABDOMEN: Soft, nontender   EXTREMITIES: No clubbing, cyanosis, or edema.   NEUROLOGICAL: No gross focal motor abnormalities   LYMPH: No cervical adenopathy palpated.   SKIN: No rashes, lesions.       Labs:  Lab Results   Component Value Date/Time    WBC 4.6 (L) 02/16/2023 07:13 AM    RBC 4.33 02/16/2023 07:13 AM    HEMOGLOBIN 13.7 02/16/2023 07:13 AM    MCV 97.2 02/16/2023 07:13 AM    MCH 31.6 02/16/2023 07:13 AM    MCHC 32.5 (L) 02/16/2023 07:13 AM    RDW 43.3 02/16/2023 07:13 AM    MPV 11.3 02/16/2023 07:13 AM       Lab Results   Component Value Date/Time    SODIUM 137 07/14/2023 12:05 PM    POTASSIUM 3.5 (L) 07/14/2023 12:05 PM    CHLORIDE 99 07/14/2023 12:05 PM    CO2 28 07/14/2023 12:05 PM    ANION 10.0 07/14/2023 12:05 PM    GLUCOSE 99 07/14/2023 12:05 PM    BUN 16 07/14/2023 12:05 PM    CREATININE 0.76 07/14/2023 12:05 PM    CALCIUM 9.9 07/14/2023 12:05 PM    ASTSGOT 20 07/14/2023 12:05 PM    ALTSGPT 19 07/14/2023 12:05 PM    TBILIRUBIN 0.7 07/14/2023 12:05 PM    ALBUMIN 4.8 07/14/2023 12:05 PM    ALBUMIN 4.82 05/27/2022 09:53 AM    TOTPROTEIN 7.1 07/14/2023 12:05 PM    TOTPROTEIN 7.8 05/27/2022 09:53 AM    GLOBULIN 2.3 07/14/2023 12:05 PM    " AGRATIO 2.1 2023 12:05 PM       Lab Results   Component Value Date/Time    TSHULTRASEN 2.420 2021 0800     Lab Results   Component Value Date/Time    FREET4 1.27 2021 0800     No results found for: FREET3  No results found for: THYSTIMIG      Imagin2022 8:26 AM     HISTORY/REASON FOR EXAM:  Follow-up for thyroid nodules        TECHNIQUE/EXAM DESCRIPTION:  Ultrasound of the soft tissues of the head and neck.     COMPARISON:  Previous ultrasound on May 2021     FINDINGS:  The thyroid gland is homogeneous.  Vascularity is normal.     The right lobe of the thyroid gland measures 4.89 cm x 1.28 cm x 1.40 cm cm.  The left lobe of the thyroid gland measures 4.18 cm x 1.13 cm x 1.28 cm cm.  The isthmus measures 0.17 cm cm.     Nodules >= 1cm:        Nodule #1  There is a(n) hypoechoic wider than tall mixed nodule with smooth margins and no echogenic foci measuring 1.24 x 0.81 x 0.95 cm located on the right upper lobe.     Nodule #2  There is a(n) isoechoic wider than tall solid nodule with smooth margins and no echogenic foci measuring 1.44 x 0.78 x 1.09 cm located on the right lower lobe.     There are no suspicious lateral neck node seen     IMPRESSION:     Homogenous thyroid gland with dominant:  Stable Low suspicion complex nodule located on the right upper lobe  Stable, previously biopsied Low suspicion solid isoechoic nodule on the right lower lobe     ANALIA Recommendations  Observation - repeat US in 6 to 12 months in the office.  Schedule for biopsy in the office:  Low suspicion nodule if measuring >/= to 1.5 cm           US report completed and dictated by  Bernabe Mann MD, FACE, [facility]        ASSESSMENT/PLAN:     1. Multiple thyroid nodules  Stable low risk nodules  Recent ultrasound findings were discussed and reviewed with the patient  She is euthyroid and has no compressive symptoms  I am scheduling her for repeat ultrasound in the office      2. Vitamin D deficiency  Controlled    continue vitamin D3 supplementation   Continue monitoring      3. Osteopenia, unspecified location  Stable without fracture  Continue calcium vitamin D supplementation  Continue regular weightbearing exercise  Her bone density should be repeated on July 29, 2024      Return in about 6 months (around 2/1/2024).      Thank you kindly for allowing me to participate in the thyroid care plan for this patient.    Bernabe Mann MD, FACE, [facility]    CC:   Felix Campos D.O.

## 2023-08-01 NOTE — PROGRESS NOTES
New GYN Problem Note    CC:   Gynecologic Exam ( New patient )      HPI:   Patient is a 63 y.o.  who presents for consultation for postmenopausal bleeding.  It was a single episode of light red spotting.  It has not recurred. There was no associated cramping or pain and did not happen with any relation to intercourse. She is still sexually active and uses lubricant.   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  US showed mildly thickened stripe with small area suspicious for polyp.      GYNECOLOGIC HISTORY:   Current Sexual Activity: yes  History of sexually transmitted diseases? No  Abnormal discharge? No     Menstrual History  Patient's last menstrual period was Patient's last menstrual period was 2008.  Menopause age 49        Pap History  Last Pap: 2021 - all paps normal.  Will have 1 more pap then stop due to age 63  Hx Moderate or Severe Dysplasia : No     Sexually active:    Social History     Substance and Sexual Activity   Sexual Activity Yes    Partners: Male    Birth control/protection: None       OBSTETRIC HISTORY:  OB History    Para Term  AB Living   5 3 3   2 3   SAB IAB Ectopic Molar Multiple Live Births   2       1 3      # Outcome Date GA Lbr [place]ton/2nd Weight Sex Delivery Anes PTL Lv   5 Term     F Vag-Spont   TESFAYE   4A Term     M Vag-Spont   TESFAYE   4B Term     M       3 Term     M Vag-Spont   TESFAYE   2 SAB            1 SAB                MEDICAL HISTORY:  Past Medical History:   Diagnosis Date    Celiac disease 2015    Family history of ischemic heart disease (IHD)     FH: renal cell carcinoma 2015    Hyperlipidemia 2015    Migraine headache 2015    Thyroid disease     nodules       SURGICAL HISTORY:  Past Surgical History:   Procedure Laterality Date    DILATION AND EVACUATION   &     TONSILLECTOMY         FAMILY HISTORY:  Family History   Problem Relation Age of Onset    Arthritis Mother     Glasses Mother      Other Mother     Heart Disease Mother     Cancer Sister         Melanoma    Other Sister     Cancer Father         Renal & Lung    Lung Disease Father     Glasses Father     Cancer Brother         Melanoma - eye    Other Brother     Glaucoma Brother     Glasses Child     Other Child         SIDS    Glasses Child     Glasses Child     Cancer Paternal Grandfather         Renal    Heart Disease Maternal Grandmother     Heart Attack Maternal Uncle 65        MI       ALLERGIES / REACTIONS:  Allergies   Allergen Reactions    Gluten Meal Diarrhea    Pineapple Diarrhea       SOCIAL HISTORY:   reports that she quit smoking about 45 years ago. Her smoking use included cigarettes. She has a 0.75 pack-year smoking history. She has never used smokeless tobacco. She reports current alcohol use of about 2.4 oz of alcohol per week. She reports that she does not use drugs.    ROS:   Positive ROS: none  Gen: no fevers or chills, no significant weight loss or gain, excessive fatigue  Respiratory:  no cough or dyspnea  Cardiac:  no chest pain, no palpitations, no syncope  Breast: no breast discharge, pain, lump or skin changes  GI:  no heartburn, no abdominal pain, no nausea or vomiting  Urinary: no dysuria, urgency, frequency, incontinence   Psych: no depression or anxiety  Neuro: no migraines with aura, fainting spells, numbness or tingling  Extremities: no joint pain, persistently swollen ankles, recurrent leg cramps       PHYSICAL EXAMINATION:  Vital Signs:   Vitals:    08/01/23 1318   BP: 125/75   BP Location: Right arm   Patient Position: Sitting   BP Cuff Size: Small adult   Weight: 142 lb     Body mass index is 22.82 kg/m².  Constitutional: The patient is well developed and well nourished.  Psychiatric: Patient is oriented to time place and person.   Skin: No rash observed.  Neck: Neck appears symmetric.  Respiratory: normal effort  Abdomen: Soft, non-tender.  Extremeties: Legs are symmetric and without tenderness.     Pelvic  exam deferred until day of surgery    ASSESSMENT AND PLAN:  63 y.o.     1. Postmenopausal bleeding        2. Endometrial polyp             - discussed that her relatively thin endometrium is overall reassuring that this does not represent a concern for malignancy however we recommend endometrial biopsy for any stripe >4mm.     - In additional, due to focally thickened area, possible polyp, office biopsy is deferred today as the findings do not represent a global process and can be missed on office biopsy   - recommend hysteroscopy with D&C/polypectomy for endometrial sampling and removal of polyp which is likely (benign) source of episodic spotting   - reviewed surgical procedure, side effects and scheduling   - questions answered          Vivian De La Paz D.O.

## 2023-08-08 ENCOUNTER — APPOINTMENT (OUTPATIENT)
Dept: ADMISSIONS | Facility: MEDICAL CENTER | Age: 63
End: 2023-08-08
Attending: OBSTETRICS & GYNECOLOGY
Payer: COMMERCIAL

## 2023-08-10 ENCOUNTER — OFFICE VISIT (OUTPATIENT)
Dept: MEDICAL GROUP | Facility: LAB | Age: 63
End: 2023-08-10
Payer: COMMERCIAL

## 2023-08-10 VITALS
DIASTOLIC BLOOD PRESSURE: 68 MMHG | BODY MASS INDEX: 22.53 KG/M2 | OXYGEN SATURATION: 98 % | RESPIRATION RATE: 18 BRPM | HEIGHT: 66 IN | WEIGHT: 140.2 LBS | TEMPERATURE: 97.5 F | SYSTOLIC BLOOD PRESSURE: 128 MMHG | HEART RATE: 68 BPM

## 2023-08-10 DIAGNOSIS — Z12.31 ENCOUNTER FOR SCREENING MAMMOGRAM FOR MALIGNANT NEOPLASM OF BREAST: ICD-10-CM

## 2023-08-10 DIAGNOSIS — Z23 NEED FOR VACCINATION: ICD-10-CM

## 2023-08-10 DIAGNOSIS — Z00.00 HEALTHCARE MAINTENANCE: ICD-10-CM

## 2023-08-10 DIAGNOSIS — N93.9 VAGINAL BLEEDING: ICD-10-CM

## 2023-08-10 PROCEDURE — 99397 PER PM REEVAL EST PAT 65+ YR: CPT | Mod: 25 | Performed by: STUDENT IN AN ORGANIZED HEALTH CARE EDUCATION/TRAINING PROGRAM

## 2023-08-10 PROCEDURE — 3078F DIAST BP <80 MM HG: CPT | Performed by: STUDENT IN AN ORGANIZED HEALTH CARE EDUCATION/TRAINING PROGRAM

## 2023-08-10 PROCEDURE — 90471 IMMUNIZATION ADMIN: CPT | Performed by: STUDENT IN AN ORGANIZED HEALTH CARE EDUCATION/TRAINING PROGRAM

## 2023-08-10 PROCEDURE — 3074F SYST BP LT 130 MM HG: CPT | Performed by: STUDENT IN AN ORGANIZED HEALTH CARE EDUCATION/TRAINING PROGRAM

## 2023-08-10 PROCEDURE — 90677 PCV20 VACCINE IM: CPT | Performed by: STUDENT IN AN ORGANIZED HEALTH CARE EDUCATION/TRAINING PROGRAM

## 2023-08-10 ASSESSMENT — ENCOUNTER SYMPTOMS
CHILLS: 0
BLOOD IN STOOL: 0
ABDOMINAL PAIN: 0
FEVER: 0
VOMITING: 0
PALPITATIONS: 0
COUGH: 0
SPUTUM PRODUCTION: 0

## 2023-08-10 ASSESSMENT — FIBROSIS 4 INDEX: FIB4 SCORE: 1.55

## 2023-08-10 ASSESSMENT — PATIENT HEALTH QUESTIONNAIRE - PHQ9: CLINICAL INTERPRETATION OF PHQ2 SCORE: 0

## 2023-08-10 NOTE — PROGRESS NOTES
Subjective:     CC:   Chief Complaint   Patient presents with    Annual Exam       HPI:   Lily Sawant is a 63 y.o. female who presents for annual exam    Patient has GYN provider: Yes  Last Pap Smear:   H/O Abnormal Pap: No  Last Mammogram:   Last Bone Density Test:   Last Colorectal Cancer Screenin  Last Tdap:   Received HPV series: Aged out    Exercise: everyday exercise   Diet: gluten free diet     Patient's last menstrual period was 2008.  She has not utilized hormone replacement therapy.  Denies any menopausal symptoms.  No significant bloating/fluid retention, pelvic pain, or dyspareunia. No abnormal vaginal discharge.   No breast tenderness, mass, nipple discharge or changes in size or contour.    OB History    Para Term  AB Living   5 3 3 0 2 3   SAB IAB Ectopic Molar Multiple Live Births   2 0 0 0 1 3      She  reports being sexually active and has had partner(s) who are male. She reports using the following method of birth control/protection: None.    She  has a past medical history of Celiac disease (2015), Family history of ischemic heart disease (IHD), FH: renal cell carcinoma (2015), Hyperlipidemia (2015), Migraine headache (2015), and Thyroid disease.    She has no past medical history of History of melanoma or Personal history of renal cancer.  She  has a past surgical history that includes tonsillectomy and dilation and evacuation ( & ).    Family History   Problem Relation Age of Onset    Arthritis Mother     Glasses Mother     Other Mother     Heart Disease Mother     Cancer Sister         Melanoma    Other Sister     Cancer Father         Renal & Lung    Lung Disease Father     Glasses Father     Cancer Brother         Melanoma - eye    Other Brother     Glaucoma Brother     Glasses Child     Other Child         SIDS    Glasses Child     Glasses Child     Cancer Paternal Grandfather         Renal    Heart Disease  Maternal Grandmother     Heart Attack Maternal Uncle 65        MI     Social History     Tobacco Use    Smoking status: Former     Packs/day: 0.25     Years: 3.00     Pack years: 0.75     Types: Cigarettes     Quit date: 1978     Years since quittin.5    Smokeless tobacco: Never   Vaping Use    Vaping Use: Never used   Substance Use Topics    Alcohol use: Yes     Alcohol/week: 2.4 oz     Types: 4 Glasses of wine per week    Drug use: No       Patient Active Problem List    Diagnosis Date Noted    Postmenopausal bleeding 2023    Endometrial polyp 2023    Vaginal bleeding 2023    Myopia of both eyes 2022    Multiple thyroid nodules 2021    Dermatochalasis of left upper eyelid 2021    Glaucoma suspect of both eyes 2021    Age-related nuclear cataract of both eyes 2021    PVD (posterior vitreous detachment), bilateral 2019    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 10/23/2017    Vitamin D deficiency 2016    Mixed hyperlipidemia 2016    Family history of ischemic heart disease (IHD)     Atherosclerosis of both carotid arteries, mild per 2016 Ultrasound 2016    Postmenopausal 2016    Renal cyst, left 2016    Migraine with aura and without status migrainosus, not intractable 2015    Celiac disease 2015    FH: renal cell carcinoma 2015    Family history of malignant melanoma 2015     Current Outpatient Medications   Medication Sig Dispense Refill    atorvastatin (LIPITOR) 20 MG Tab TAKE 1 TABLET BY MOUTH ONCE DAILY AT BEDTIME 90 Tablet 3    Multiple Vitamins-Minerals (WOMENS DAILY FORMULA PO) Take  by mouth.      Cholecalciferol (VITAMIN D) 2000 units Cap Take 2,000 Units by mouth every day.      aspirin EC (ECOTRIN) 81 MG Tablet Delayed Response Take 81 mg by mouth every day.       No current facility-administered medications for this visit.     Allergies   Allergen Reactions    Gluten Meal  "Diarrhea    Pineapple Diarrhea       Review of Systems   Review of Systems   Constitutional:  Negative for chills and fever.   Respiratory:  Negative for cough and sputum production.    Cardiovascular:  Negative for chest pain and palpitations.   Gastrointestinal:  Negative for abdominal pain, blood in stool and vomiting.         Objective:   /68 (BP Location: Right arm, Patient Position: Sitting, BP Cuff Size: Adult long)   Pulse 68   Temp 36.4 °C (97.5 °F)   Resp 18   Ht 1.68 m (5' 6.14\")   Wt 63.6 kg (140 lb 3.2 oz)   LMP 08/25/2008   SpO2 98%   BMI 22.53 kg/m²     Wt Readings from Last 4 Encounters:   08/10/23 63.6 kg (140 lb 3.2 oz)   08/01/23 64 kg (141 lb 1.6 oz)   08/01/23 64.4 kg (142 lb)   06/07/23 64.1 kg (141 lb 6.4 oz)         Physical Exam:  Physical Exam  Constitutional:       Appearance: Normal appearance.   HENT:      Head: Normocephalic and atraumatic.      Right Ear: Tympanic membrane and ear canal normal.      Left Ear: Tympanic membrane and ear canal normal.      Mouth/Throat:      Mouth: Mucous membranes are moist.      Pharynx: Oropharynx is clear.   Eyes:      Extraocular Movements: Extraocular movements intact.      Pupils: Pupils are equal, round, and reactive to light.   Neck:      Thyroid: No thyromegaly.   Cardiovascular:      Rate and Rhythm: Normal rate and regular rhythm.      Heart sounds: Normal heart sounds.   Pulmonary:      Effort: Pulmonary effort is normal.      Breath sounds: Normal breath sounds.   Abdominal:      General: Abdomen is flat. Bowel sounds are normal.      Palpations: Abdomen is soft. There is no mass.      Tenderness: There is no abdominal tenderness. There is no guarding.   Musculoskeletal:      Cervical back: Normal range of motion and neck supple.      Right lower leg: No edema.      Left lower leg: No edema.   Lymphadenopathy:      Cervical: No cervical adenopathy.   Skin:     General: Skin is warm and dry.   Neurological:      General: No " focal deficit present.      Mental Status: She is alert.           Assessment and Plan:     1. Vaginal bleeding    2. Encounter for screening mammogram for malignant neoplasm of breast  - MA-SCREENING MAMMO BILAT W/TOMOSYNTHESIS W/CAD; Future    3. Need for vaccination  - Pneumococcal Conjugate Vaccine 20-Valent (19 yrs+)    4. Healthcare maintenance  - VITAMIN D,25 HYDROXY (DEFICIENCY); Future  - HEMOGLOBIN A1C; Future  - CBC WITH DIFFERENTIAL; Future  - Comp Metabolic Panel; Future  - TSH WITH REFLEX TO FT4; Future  - Lipid Profile; Future      Health maintenance:    Labs per orders  Immunizations per orders  Patient counseled about skin care, diet, supplements, and exercise.  Discussed  diet and exercise  Anticipatory guidance: patient reminded to wear seat when driving to prevent injuries.     Follow-up: 1 year annual

## 2023-08-14 ENCOUNTER — PRE-ADMISSION TESTING (OUTPATIENT)
Dept: ADMISSIONS | Facility: MEDICAL CENTER | Age: 63
End: 2023-08-14
Attending: OBSTETRICS & GYNECOLOGY
Payer: COMMERCIAL

## 2023-08-14 DIAGNOSIS — Z01.810 PRE-OPERATIVE CARDIOVASCULAR EXAMINATION: ICD-10-CM

## 2023-08-14 DIAGNOSIS — Z01.812 PRE-OPERATIVE LABORATORY EXAMINATION: ICD-10-CM

## 2023-08-15 ENCOUNTER — PRE-ADMISSION TESTING (OUTPATIENT)
Dept: ADMISSIONS | Facility: MEDICAL CENTER | Age: 63
End: 2023-08-15
Attending: OBSTETRICS & GYNECOLOGY
Payer: COMMERCIAL

## 2023-08-15 DIAGNOSIS — Z01.812 PRE-OPERATIVE LABORATORY EXAMINATION: ICD-10-CM

## 2023-08-15 DIAGNOSIS — Z00.00 HEALTHCARE MAINTENANCE: ICD-10-CM

## 2023-08-15 DIAGNOSIS — Z01.810 PRE-OPERATIVE CARDIOVASCULAR EXAMINATION: ICD-10-CM

## 2023-08-15 LAB
ABO GROUP BLD: NORMAL
BASOPHILS # BLD AUTO: 0.6 % (ref 0–1.8)
BASOPHILS # BLD: 0.03 K/UL (ref 0–0.12)
BLD GP AB SCN SERPL QL: NORMAL
EKG IMPRESSION: NORMAL
EOSINOPHIL # BLD AUTO: 0.07 K/UL (ref 0–0.51)
EOSINOPHIL NFR BLD: 1.4 % (ref 0–6.9)
ERYTHROCYTE [DISTWIDTH] IN BLOOD BY AUTOMATED COUNT: 42.5 FL (ref 35.9–50)
HCT VFR BLD AUTO: 41.4 % (ref 37–47)
HGB BLD-MCNC: 13.5 G/DL (ref 12–16)
IMM GRANULOCYTES # BLD AUTO: 0.01 K/UL (ref 0–0.11)
IMM GRANULOCYTES NFR BLD AUTO: 0.2 % (ref 0–0.9)
LYMPHOCYTES # BLD AUTO: 1.33 K/UL (ref 1–4.8)
LYMPHOCYTES NFR BLD: 27.2 % (ref 22–41)
MCH RBC QN AUTO: 31.3 PG (ref 27–33)
MCHC RBC AUTO-ENTMCNC: 32.6 G/DL (ref 32.2–35.5)
MCV RBC AUTO: 95.8 FL (ref 81.4–97.8)
MONOCYTES # BLD AUTO: 0.46 K/UL (ref 0–0.85)
MONOCYTES NFR BLD AUTO: 9.4 % (ref 0–13.4)
NEUTROPHILS # BLD AUTO: 2.99 K/UL (ref 1.82–7.42)
NEUTROPHILS NFR BLD: 61.2 % (ref 44–72)
NRBC # BLD AUTO: 0 K/UL
NRBC BLD-RTO: 0 /100 WBC (ref 0–0.2)
PLATELET # BLD AUTO: 204 K/UL (ref 164–446)
PMV BLD AUTO: 11 FL (ref 9–12.9)
RBC # BLD AUTO: 4.32 M/UL (ref 4.2–5.4)
RH BLD: NORMAL
WBC # BLD AUTO: 4.9 K/UL (ref 4.8–10.8)

## 2023-08-15 PROCEDURE — 86850 RBC ANTIBODY SCREEN: CPT

## 2023-08-15 PROCEDURE — 93005 ELECTROCARDIOGRAM TRACING: CPT

## 2023-08-15 PROCEDURE — 86901 BLOOD TYPING SEROLOGIC RH(D): CPT

## 2023-08-15 PROCEDURE — 85025 COMPLETE CBC W/AUTO DIFF WBC: CPT

## 2023-08-15 PROCEDURE — 93010 ELECTROCARDIOGRAM REPORT: CPT | Performed by: INTERNAL MEDICINE

## 2023-08-15 PROCEDURE — 36415 COLL VENOUS BLD VENIPUNCTURE: CPT

## 2023-08-15 PROCEDURE — 86900 BLOOD TYPING SEROLOGIC ABO: CPT

## 2023-08-22 ENCOUNTER — ANESTHESIA EVENT (OUTPATIENT)
Dept: SURGERY | Facility: MEDICAL CENTER | Age: 63
End: 2023-08-22
Payer: COMMERCIAL

## 2023-08-23 ENCOUNTER — HOSPITAL ENCOUNTER (OUTPATIENT)
Facility: MEDICAL CENTER | Age: 63
End: 2023-08-23
Attending: OBSTETRICS & GYNECOLOGY | Admitting: OBSTETRICS & GYNECOLOGY
Payer: COMMERCIAL

## 2023-08-23 ENCOUNTER — ANESTHESIA (OUTPATIENT)
Dept: SURGERY | Facility: MEDICAL CENTER | Age: 63
End: 2023-08-23
Payer: COMMERCIAL

## 2023-08-23 VITALS
SYSTOLIC BLOOD PRESSURE: 125 MMHG | BODY MASS INDEX: 22.18 KG/M2 | TEMPERATURE: 97 F | DIASTOLIC BLOOD PRESSURE: 66 MMHG | WEIGHT: 138.01 LBS | RESPIRATION RATE: 14 BRPM | HEIGHT: 66 IN | HEART RATE: 59 BPM | OXYGEN SATURATION: 93 %

## 2023-08-23 LAB
ABO + RH BLD: NORMAL
PATHOLOGY CONSULT NOTE: NORMAL

## 2023-08-23 PROCEDURE — 160048 HCHG OR STATISTICAL LEVEL 1-5: Performed by: OBSTETRICS & GYNECOLOGY

## 2023-08-23 PROCEDURE — 160035 HCHG PACU - 1ST 60 MINS PHASE I: Performed by: OBSTETRICS & GYNECOLOGY

## 2023-08-23 PROCEDURE — 58558 HYSTEROSCOPY BIOPSY: CPT | Performed by: OBSTETRICS & GYNECOLOGY

## 2023-08-23 PROCEDURE — 700102 HCHG RX REV CODE 250 W/ 637 OVERRIDE(OP): Performed by: ANESTHESIOLOGY

## 2023-08-23 PROCEDURE — 36415 COLL VENOUS BLD VENIPUNCTURE: CPT

## 2023-08-23 PROCEDURE — 700105 HCHG RX REV CODE 258: Mod: JZ | Performed by: OBSTETRICS & GYNECOLOGY

## 2023-08-23 PROCEDURE — 160029 HCHG SURGERY MINUTES - 1ST 30 MINS LEVEL 4: Performed by: OBSTETRICS & GYNECOLOGY

## 2023-08-23 PROCEDURE — 88305 TISSUE EXAM BY PATHOLOGIST: CPT

## 2023-08-23 PROCEDURE — 160009 HCHG ANES TIME/MIN: Performed by: OBSTETRICS & GYNECOLOGY

## 2023-08-23 PROCEDURE — 160041 HCHG SURGERY MINUTES - EA ADDL 1 MIN LEVEL 4: Performed by: OBSTETRICS & GYNECOLOGY

## 2023-08-23 PROCEDURE — A9270 NON-COVERED ITEM OR SERVICE: HCPCS | Performed by: ANESTHESIOLOGY

## 2023-08-23 PROCEDURE — 700101 HCHG RX REV CODE 250: Performed by: ANESTHESIOLOGY

## 2023-08-23 PROCEDURE — 160046 HCHG PACU - 1ST 60 MINS PHASE II: Performed by: OBSTETRICS & GYNECOLOGY

## 2023-08-23 PROCEDURE — 700111 HCHG RX REV CODE 636 W/ 250 OVERRIDE (IP): Performed by: ANESTHESIOLOGY

## 2023-08-23 PROCEDURE — 160025 RECOVERY II MINUTES (STATS): Performed by: OBSTETRICS & GYNECOLOGY

## 2023-08-23 PROCEDURE — 160002 HCHG RECOVERY MINUTES (STAT): Performed by: OBSTETRICS & GYNECOLOGY

## 2023-08-23 RX ORDER — SODIUM CHLORIDE, SODIUM LACTATE, POTASSIUM CHLORIDE, CALCIUM CHLORIDE 600; 310; 30; 20 MG/100ML; MG/100ML; MG/100ML; MG/100ML
INJECTION, SOLUTION INTRAVENOUS CONTINUOUS
Status: DISCONTINUED | OUTPATIENT
Start: 2023-08-23 | End: 2023-08-23 | Stop reason: HOSPADM

## 2023-08-23 RX ORDER — HYDROMORPHONE HYDROCHLORIDE 1 MG/ML
0.2 INJECTION, SOLUTION INTRAMUSCULAR; INTRAVENOUS; SUBCUTANEOUS
Status: DISCONTINUED | OUTPATIENT
Start: 2023-08-23 | End: 2023-08-23 | Stop reason: HOSPADM

## 2023-08-23 RX ORDER — OXYCODONE HCL 5 MG/5 ML
5 SOLUTION, ORAL ORAL
Status: DISCONTINUED | OUTPATIENT
Start: 2023-08-23 | End: 2023-08-23 | Stop reason: HOSPADM

## 2023-08-23 RX ORDER — ONDANSETRON 2 MG/ML
INJECTION INTRAMUSCULAR; INTRAVENOUS PRN
Status: DISCONTINUED | OUTPATIENT
Start: 2023-08-23 | End: 2023-08-23 | Stop reason: SURG

## 2023-08-23 RX ORDER — KETOROLAC TROMETHAMINE 30 MG/ML
INJECTION, SOLUTION INTRAMUSCULAR; INTRAVENOUS PRN
Status: DISCONTINUED | OUTPATIENT
Start: 2023-08-23 | End: 2023-08-23 | Stop reason: SURG

## 2023-08-23 RX ORDER — ONDANSETRON 2 MG/ML
4 INJECTION INTRAMUSCULAR; INTRAVENOUS
Status: DISCONTINUED | OUTPATIENT
Start: 2023-08-23 | End: 2023-08-23 | Stop reason: HOSPADM

## 2023-08-23 RX ORDER — LABETALOL HYDROCHLORIDE 5 MG/ML
5 INJECTION, SOLUTION INTRAVENOUS
Status: DISCONTINUED | OUTPATIENT
Start: 2023-08-23 | End: 2023-08-23 | Stop reason: HOSPADM

## 2023-08-23 RX ORDER — HYDRALAZINE HYDROCHLORIDE 20 MG/ML
5 INJECTION INTRAMUSCULAR; INTRAVENOUS
Status: DISCONTINUED | OUTPATIENT
Start: 2023-08-23 | End: 2023-08-23 | Stop reason: HOSPADM

## 2023-08-23 RX ORDER — OXYCODONE HCL 5 MG/5 ML
10 SOLUTION, ORAL ORAL
Status: DISCONTINUED | OUTPATIENT
Start: 2023-08-23 | End: 2023-08-23 | Stop reason: HOSPADM

## 2023-08-23 RX ORDER — ACETAMINOPHEN 500 MG
1000 TABLET ORAL ONCE
Status: COMPLETED | OUTPATIENT
Start: 2023-08-23 | End: 2023-08-23

## 2023-08-23 RX ORDER — HALOPERIDOL 5 MG/ML
1 INJECTION INTRAMUSCULAR
Status: DISCONTINUED | OUTPATIENT
Start: 2023-08-23 | End: 2023-08-23 | Stop reason: HOSPADM

## 2023-08-23 RX ORDER — DEXAMETHASONE SODIUM PHOSPHATE 4 MG/ML
INJECTION, SOLUTION INTRA-ARTICULAR; INTRALESIONAL; INTRAMUSCULAR; INTRAVENOUS; SOFT TISSUE PRN
Status: DISCONTINUED | OUTPATIENT
Start: 2023-08-23 | End: 2023-08-23 | Stop reason: SURG

## 2023-08-23 RX ORDER — EPHEDRINE SULFATE 50 MG/ML
INJECTION, SOLUTION INTRAVENOUS PRN
Status: DISCONTINUED | OUTPATIENT
Start: 2023-08-23 | End: 2023-08-23 | Stop reason: SURG

## 2023-08-23 RX ORDER — HYDROMORPHONE HYDROCHLORIDE 1 MG/ML
0.4 INJECTION, SOLUTION INTRAMUSCULAR; INTRAVENOUS; SUBCUTANEOUS
Status: DISCONTINUED | OUTPATIENT
Start: 2023-08-23 | End: 2023-08-23 | Stop reason: HOSPADM

## 2023-08-23 RX ORDER — HYDROMORPHONE HYDROCHLORIDE 1 MG/ML
0.1 INJECTION, SOLUTION INTRAMUSCULAR; INTRAVENOUS; SUBCUTANEOUS
Status: DISCONTINUED | OUTPATIENT
Start: 2023-08-23 | End: 2023-08-23 | Stop reason: HOSPADM

## 2023-08-23 RX ORDER — MEPERIDINE HYDROCHLORIDE 25 MG/ML
6.25 INJECTION INTRAMUSCULAR; INTRAVENOUS; SUBCUTANEOUS
Status: DISCONTINUED | OUTPATIENT
Start: 2023-08-23 | End: 2023-08-23 | Stop reason: HOSPADM

## 2023-08-23 RX ORDER — MISOPROSTOL 200 UG/1
TABLET ORAL
Status: DISCONTINUED
Start: 2023-08-23 | End: 2023-08-23 | Stop reason: HOSPADM

## 2023-08-23 RX ADMIN — PROPOFOL 10 MG: 10 INJECTION, EMULSION INTRAVENOUS at 12:41

## 2023-08-23 RX ADMIN — KETOROLAC TROMETHAMINE 15 MG: 30 INJECTION, SOLUTION INTRAMUSCULAR; INTRAVENOUS at 12:38

## 2023-08-23 RX ADMIN — EPHEDRINE SULFATE 5 MG: 50 INJECTION, SOLUTION INTRAVENOUS at 12:26

## 2023-08-23 RX ADMIN — ACETAMINOPHEN 1000 MG: 500 TABLET, FILM COATED ORAL at 10:57

## 2023-08-23 RX ADMIN — FENTANYL CITRATE 25 MCG: 50 INJECTION, SOLUTION INTRAMUSCULAR; INTRAVENOUS at 12:37

## 2023-08-23 RX ADMIN — PROPOFOL 10 MG: 10 INJECTION, EMULSION INTRAVENOUS at 12:39

## 2023-08-23 RX ADMIN — ONDANSETRON 4 MG: 2 INJECTION INTRAMUSCULAR; INTRAVENOUS at 12:11

## 2023-08-23 RX ADMIN — SODIUM CHLORIDE, POTASSIUM CHLORIDE, SODIUM LACTATE AND CALCIUM CHLORIDE: 600; 310; 30; 20 INJECTION, SOLUTION INTRAVENOUS at 12:08

## 2023-08-23 RX ADMIN — DEXAMETHASONE SODIUM PHOSPHATE 8 MG: 4 INJECTION INTRA-ARTICULAR; INTRALESIONAL; INTRAMUSCULAR; INTRAVENOUS; SOFT TISSUE at 12:16

## 2023-08-23 RX ADMIN — PROPOFOL 100 MG: 10 INJECTION, EMULSION INTRAVENOUS at 12:12

## 2023-08-23 RX ADMIN — FENTANYL CITRATE 25 MCG: 50 INJECTION, SOLUTION INTRAMUSCULAR; INTRAVENOUS at 12:22

## 2023-08-23 RX ADMIN — FENTANYL CITRATE 50 MCG: 50 INJECTION, SOLUTION INTRAMUSCULAR; INTRAVENOUS at 12:09

## 2023-08-23 ASSESSMENT — PAIN DESCRIPTION - PAIN TYPE
TYPE: SURGICAL PAIN

## 2023-08-23 ASSESSMENT — PAIN SCALES - GENERAL: PAIN_LEVEL: 0

## 2023-08-23 ASSESSMENT — FIBROSIS 4 INDEX: FIB4 SCORE: 1.46

## 2023-08-23 NOTE — INTERVAL H&P NOTE
H&P reviewed. The patient was examined and there are no changes to the H&P    Vivian De La Paz D.O.

## 2023-08-23 NOTE — ANESTHESIA PROCEDURE NOTES
Airway    Date/Time: 8/23/2023 12:13 PM    Performed by: Pat Dacosta M.D.  Authorized by: Pat Dacosta M.D.    Location:  OR  Urgency:  Elective  Difficult Airway: No    Indications for Airway Management:  Anesthesia      Spontaneous Ventilation: absent    Sedation Level:  Deep  Preoxygenated: Yes    Mask Difficulty Assessment:  1 - vent by mask  Final Airway Type:  Supraglottic airway  Final Supraglottic Airway:  Standard LMA    SGA Size:  4  Number of Attempts at Approach:  1

## 2023-08-23 NOTE — OR NURSING
1326 Assumed care, patient meets phase II critera.     1337 Patient getting dressed without assistance    1345 Able to void,  on the way    1400 Escorted out to  with all belongings, discharge instructions reviewed with patient and .

## 2023-08-23 NOTE — OR NURSING
*This is a Running Note*    1247 Pt to PACU 05 from OR. Bedside report from anesthesiologist and RN.  Attached to monitoring, VSS, breathing is calm and unlabored, Patient is asleep currently. Pt has a Per-pad on and CDI. Remains on 6 L oxygen via mask with an Oral airway in place. Pt denies pain or nausea at this time.     1302 Pt Now on RA, and has had some water, tolerating well.  updated.    1326 Handoff care to Chayo VEGA.

## 2023-08-23 NOTE — ANESTHESIA TIME REPORT
Anesthesia Start and Stop Event Times     Date Time Event    8/23/2023 1139 Ready for Procedure     1208 Anesthesia Start     1248 Anesthesia Stop        Responsible Staff  08/23/23    Name Role Begin End    Pat Dacosta M.D. Anesth 1208 1248        Overtime Reason:  no overtime (within assigned shift)    Comments:

## 2023-08-23 NOTE — DISCHARGE INSTRUCTIONS
If any questions arise, call your provider.  If your provider is not available, please feel free to call the [facility] at [phone].    MEDICATIONS: Resume taking daily medication.  Take prescribed pain medication with food.  If no medication is prescribed, you may take non-aspirin pain medication if needed.  PAIN MEDICATION CAN BE VERY CONSTIPATING.  Take a stool softener or laxative such as senokot, pericolace, or milk of magnesia if needed.    Last pain medication given at:    Tylenol 1,000 mg at 11:00 am, next dose after 5:00 pm   Toradol (Anti-inflammatory like Ibuprofen/Motrin) given at 12:30 pm, next dose of Ibuprofen/Motrin after 6:30 pm      What to Expect Post Anesthesia    Rest and take it easy for the first 24 hours.  A responsible adult is recommended to remain with you during that time.  It is normal to feel sleepy.  We encourage you to not do anything that requires balance, judgment or coordination.    FOR 24 HOURS DO NOT:  Drive, operate machinery or run household appliances.  Drink beer or alcoholic beverages.  Make important decisions or sign legal documents.    To avoid nausea, slowly advance diet as tolerated, avoiding spicy or greasy foods for the first day.  Add more substantial food to your diet according to your provider's instructions.  Babies can be fed formula or breast milk as soon as they are hungry.  INCREASE FLUIDS AND FIBER TO AVOID CONSTIPATION.    MILD FLU-LIKE SYMPTOMS ARE NORMAL.  YOU MAY EXPERIENCE GENERALIZED MUSCLE ACHES, THROAT IRRITATION, HEADACHE AND/OR SOME NAUSEA.

## 2023-08-23 NOTE — OP REPORT
Operative Report - Operative Hysteroscopy    Patient: Lily Sawant  MRN: [id number]     YOB: 1960   Age: 63 y.o.   Sex:female  Unit: [facility]  Room/Bed: R OR POOL/NONE  Location: [facility]            DATE OF OPERATION: 2023     Pre-operative Diagnosis: post-menopausal bleeding  Post-operative Diagnosis: same   Procedure: Hysteroscopy/D&C/polypectomy  Anesthesia: General   Antibiotics: none  Surgeon(s):Brain  Assistant(s): CHRISTOPHE Peters  Findings:   1. normal appearing uterine cavity with 2-3 endometrial polyps  Complications: none   Specimens: none   EBL: Minimal   Hysteroscopic Fluid Deficit:  100mL deficit of NS    Indications:   Patient is a 63 y.o.  with post-menopausal bleeding episode x1. Success rate, risks, and benefits of the procedure were discussed with patient at length.  The patient is aware of risk of infection, bleeding, fluid overload, blood clot in the leg or lungs, perforation of the uterus, anesthesia complications, and possible conversion to laparotomy.  All patient's questions were thoroughly answered prior to the procedure and she agreed to the procedure.    Procedure:   The patient was taken to the operating room and placed under general anesthesia. She was then prepped and draped in the normal sterile fashion in the dorsal lithotomy position. Two vaughn retractors were placed into the vagina and the anterior lip of the cervix was grasped with a(n) Allis clamp and pulled into view. The cervix was dilated with Hanks dilators to accommodate the scope.  Next, the hysteroscope was placed into the cervical os and advanced slowly into the uterine cavity under direct visualization.    An inspection of the uterine cavity revealed the findings listed above. The myosure device was then advanced into the scope and used to remove the 2 endometrial polyps and an additional area of polypoid tissue.  Additionally a 360 degree  circumferential sampling of the endometrium was obtained under direct visualization.    All instruments were removed from the uterus, cervix and vagina, and hemostasis of the cervix was observed. At the end of the procedure, total fluid deficit was 100mL  .   Sponge and instrument counts were correct at the end of the procedure and the patient was taken to recovery in good condition      Vivian De La Paz D.O.

## 2023-08-23 NOTE — ANESTHESIA PREPROCEDURE EVALUATION
Case: [id number] Date/Time: 08/23/23 1245    Procedures:       HYSTEROSCOPY DILATION AND CURETTAGE, POLYPECTOMY      DILATION AND CURETTAGE    Pre-op diagnosis: POSTMENOPAUSAL BLEEDING, ENDOMETRIAL POLYP    Location: [facility] ROOM 25 / SURGERY SAME DAY [facility]    Surgeons: Vivian De La Paz D.O.        No current CP/SOB/N/V symptoms.     NPO  Relevant Problems   ANESTHESIA   (negative) History of anesthesia complications      NEURO   (positive) Migraine with aura and without status migrainosus, not intractable      CARDIAC   (positive) Atherosclerosis of both carotid arteries, mild per 2016 Ultrasound   (positive) Migraine with aura and without status migrainosus, not intractable         (positive) Renal cyst, left      Other   (positive) 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       Physical Exam    Airway   Mallampati: II  TM distance: >3 FB  Neck ROM: full       Cardiovascular - normal exam  Rhythm: regular  Rate: normal  (-) murmur     Dental - normal exam           Pulmonary - normal exam  Breath sounds clear to auscultation     Abdominal    Neurological - normal exam                 Anesthesia Plan    ASA 2       Plan - general       Airway plan will be LMA          Induction: intravenous    Postoperative Plan: Postoperative administration of opioids is intended.    Pertinent diagnostic labs and testing reviewed    Informed Consent:    Anesthetic plan and risks discussed with patient.    Use of blood products discussed with: patient whom consented to blood products.

## 2023-08-23 NOTE — ANESTHESIA POSTPROCEDURE EVALUATION
Patient: Lily Sawant    Procedure Summary     Date: 08/23/23 Room / Location: [facility] ROOM 25 / SURGERY SAME DAY [facility]    Anesthesia Start:  Anesthesia Stop:     Procedures:       HYSTEROSCOPY DILATION AND CURETTAGE, POLYPECTOMY (Uterus)      DILATION AND CURETTAGE (Uterus) Diagnosis: (POSTMENOPAUSAL BLEEDING, ENDOMETRIAL POLYP)    Surgeons: Vivian De La Paz D.O. Responsible Provider:     Anesthesia Type: general ASA Status: 2          Final Anesthesia Type: general  Last vitals  BP   130/58   Temp   36.1 °C (97 °F)    Pulse   61   Resp   18    SpO2   100 %      Anesthesia Post Evaluation    Patient location during evaluation: PACU  Patient participation: complete - patient participated  Level of consciousness: awake and alert  Pain score: 0    Airway patency: patent  Anesthetic complications: no  Cardiovascular status: hemodynamically stable  Respiratory status: acceptable  Hydration status: euvolemic    PONV: none          No notable events documented.     Nurse Pain Score: 0 (NPRS)        
rt/yes
no

## 2023-08-28 ENCOUNTER — APPOINTMENT (RX ONLY)
Dept: URBAN - METROPOLITAN AREA CLINIC 35 | Facility: CLINIC | Age: 63
Setting detail: DERMATOLOGY
End: 2023-08-28

## 2023-08-28 DIAGNOSIS — D22 MELANOCYTIC NEVI: ICD-10-CM

## 2023-08-28 DIAGNOSIS — Z71.89 OTHER SPECIFIED COUNSELING: ICD-10-CM

## 2023-08-28 DIAGNOSIS — L81.4 OTHER MELANIN HYPERPIGMENTATION: ICD-10-CM

## 2023-08-28 DIAGNOSIS — L82.1 OTHER SEBORRHEIC KERATOSIS: ICD-10-CM

## 2023-08-28 DIAGNOSIS — L57.0 ACTINIC KERATOSIS: ICD-10-CM

## 2023-08-28 PROBLEM — D22.5 MELANOCYTIC NEVI OF TRUNK: Status: ACTIVE | Noted: 2023-08-28

## 2023-08-28 PROBLEM — D22.72 MELANOCYTIC NEVI OF LEFT LOWER LIMB, INCLUDING HIP: Status: ACTIVE | Noted: 2023-08-28

## 2023-08-28 PROCEDURE — 17000 DESTRUCT PREMALG LESION: CPT

## 2023-08-28 PROCEDURE — 99213 OFFICE O/P EST LOW 20 MIN: CPT | Mod: 25

## 2023-08-28 PROCEDURE — ? LIQUID NITROGEN

## 2023-08-28 PROCEDURE — ? OBSERVATION AND MEASURE

## 2023-08-28 PROCEDURE — ? COUNSELING

## 2023-08-28 PROCEDURE — 17003 DESTRUCT PREMALG LES 2-14: CPT

## 2023-08-28 ASSESSMENT — LOCATION SIMPLE DESCRIPTION DERM
LOCATION SIMPLE: ABDOMEN
LOCATION SIMPLE: LEFT SHOULDER
LOCATION SIMPLE: RIGHT PRETIBIAL REGION
LOCATION SIMPLE: LEFT PRETIBIAL REGION
LOCATION SIMPLE: RIGHT SHOULDER
LOCATION SIMPLE: LEFT UPPER BACK
LOCATION SIMPLE: LEFT 2ND TOE
LOCATION SIMPLE: RIGHT FOREARM
LOCATION SIMPLE: UPPER BACK
LOCATION SIMPLE: LEFT FOREARM

## 2023-08-28 ASSESSMENT — LOCATION DETAILED DESCRIPTION DERM
LOCATION DETAILED: RIGHT DISTAL PRETIBIAL REGION
LOCATION DETAILED: INFERIOR THORACIC SPINE
LOCATION DETAILED: RIGHT POSTERIOR SHOULDER
LOCATION DETAILED: RIGHT DISTAL DORSAL FOREARM
LOCATION DETAILED: EPIGASTRIC SKIN
LOCATION DETAILED: LEFT DISTAL DORSAL FOREARM
LOCATION DETAILED: LEFT LATERAL 2ND TOE
LOCATION DETAILED: LEFT DISTAL PRETIBIAL REGION
LOCATION DETAILED: RIGHT PROXIMAL DORSAL FOREARM
LOCATION DETAILED: LEFT POSTERIOR SHOULDER
LOCATION DETAILED: LEFT MEDIAL UPPER BACK

## 2023-08-28 ASSESSMENT — LOCATION ZONE DERM
LOCATION ZONE: TOE
LOCATION ZONE: LEG
LOCATION ZONE: ARM
LOCATION ZONE: TRUNK

## 2023-08-28 NOTE — PROCEDURE: LIQUID NITROGEN
Render Post-Care Instructions In Note?: no
Duration Of Freeze Thaw-Cycle (Seconds): 10
Post-Care Instructions: I reviewed with the patient in detail post-care instructions. Patient is to wear sunprotection, and avoid picking at any of the treated lesions. Pt may apply Vaseline to crusted or scabbing areas.
Application Tool (Optional): Cry-AC
Show Applicator Variable?: Yes
Detail Level: Detailed
Number Of Freeze-Thaw Cycles: 2 freeze-thaw cycles
Consent: The patient's consent was obtained including but not limited to risks of crusting, scabbing, blistering, scarring, darker or lighter pigmentary change, recurrence, incomplete removal and infection.

## 2023-09-06 ENCOUNTER — GYNECOLOGY VISIT (OUTPATIENT)
Dept: OBGYN | Facility: CLINIC | Age: 63
End: 2023-09-06
Payer: COMMERCIAL

## 2023-09-06 VITALS — BODY MASS INDEX: 22.76 KG/M2 | WEIGHT: 141 LBS | DIASTOLIC BLOOD PRESSURE: 68 MMHG | SYSTOLIC BLOOD PRESSURE: 125 MMHG

## 2023-09-06 DIAGNOSIS — Z09 POSTOP CHECK: Primary | ICD-10-CM

## 2023-09-06 PROCEDURE — 3074F SYST BP LT 130 MM HG: CPT | Performed by: OBSTETRICS & GYNECOLOGY

## 2023-09-06 PROCEDURE — 3078F DIAST BP <80 MM HG: CPT | Performed by: OBSTETRICS & GYNECOLOGY

## 2023-09-06 PROCEDURE — 99024 POSTOP FOLLOW-UP VISIT: CPT | Performed by: OBSTETRICS & GYNECOLOGY

## 2023-09-06 ASSESSMENT — FIBROSIS 4 INDEX: FIB4 SCORE: 1.46

## 2023-09-06 NOTE — PROGRESS NOTES
CC: Post-operative check    Date of Surgery: 23    HPI: Patient is a 63 y.o. year old  who presents for follow up after hysteroscopy/D&C/polypectomy. She is doing well and anxious to get back to swimming. Reports spotting after surgery stopped >1wk ago.    ROS:   General: Fever: no  GI: Abdominal pain: no  : Vaginal bleeding: no    PFSH:  I personally reviewed the past medical and surgical histories.     PHYSICAL EXAMINATION:  Vital Signs:   Vitals:    23 1032   BP: 125/68   BP Location: Right arm   Patient Position: Sitting   BP Cuff Size: Adult   Weight: 141 lb     Appearance/Psychiatric: in no apparent distress and well developed and well nourished  Heart: She does not have edema.  Lungs:Respiratory effort is normal.    Pelvic: deferred    Pathology:  A. Endometrial polyps:          Fragments of benign endometrial polyp(s) admixed with fragments           of endometrium demonstrating cystic atrophy.          Fragments of benign superficial myometrium also present.          No atypical hyperplasia or malignancy identified.     ASSESSMENT AND PLAN:  63 y.o.    Lily was seen today for post-op.    Diagnoses and all orders for this visit:    Postop check   - reviewed benign path and no long-term concerns   - had pap in 2021.  Did not include HPV testing  Is now 63   - recommend pap/cotest at next annual in April and then stop      Vivian De La Paz D.O.  2023

## 2023-10-09 ENCOUNTER — PROCEDURE VISIT (OUTPATIENT)
Dept: ENDOCRINOLOGY | Facility: MEDICAL CENTER | Age: 63
End: 2023-10-09
Attending: INTERNAL MEDICINE
Payer: COMMERCIAL

## 2023-10-09 DIAGNOSIS — E04.2 MULTIPLE THYROID NODULES: ICD-10-CM

## 2023-10-09 PROCEDURE — 76536 US EXAM OF HEAD AND NECK: CPT | Performed by: INTERNAL MEDICINE

## 2023-10-09 NOTE — PROGRESS NOTES
Thyroid US done on this procedure visit  Her RUL complex cyst has echogenic foice and irregular margins - will schedule for FNA  She has a new benign cyst on the RML lateral  She has a stable previously biopsied isoechoic solid nodule on the RLL    Please see dictated POC US report completed by endocrinologist  Patient advised to follow up as planned with labs prior    Bernabe Mann M.D.

## 2023-12-27 ENCOUNTER — TELEPHONE (OUTPATIENT)
Dept: ENDOCRINOLOGY | Facility: MEDICAL CENTER | Age: 63
End: 2023-12-27
Payer: COMMERCIAL

## 2023-12-27 NOTE — TELEPHONE ENCOUNTER
Left voicemail in regards to appointment scheduled on 01/22 originally at 1:00pm was moved down to 2:20 due to procedure template.

## 2024-01-22 ENCOUNTER — HOSPITAL ENCOUNTER (OUTPATIENT)
Facility: MEDICAL CENTER | Age: 64
End: 2024-01-22
Attending: INTERNAL MEDICINE
Payer: COMMERCIAL

## 2024-01-22 ENCOUNTER — PROCEDURE VISIT (OUTPATIENT)
Dept: ENDOCRINOLOGY | Facility: MEDICAL CENTER | Age: 64
End: 2024-01-22
Attending: INTERNAL MEDICINE
Payer: COMMERCIAL

## 2024-01-22 DIAGNOSIS — E55.9 VITAMIN D DEFICIENCY: ICD-10-CM

## 2024-01-22 DIAGNOSIS — M85.80 OSTEOPENIA, UNSPECIFIED LOCATION: ICD-10-CM

## 2024-01-22 DIAGNOSIS — E04.2 MULTIPLE THYROID NODULES: ICD-10-CM

## 2024-01-22 LAB — PATHOLOGY CONSULT NOTE: NORMAL

## 2024-01-22 PROCEDURE — 10005 FNA BX W/US GDN 1ST LES: CPT | Performed by: INTERNAL MEDICINE

## 2024-01-22 PROCEDURE — 10005 FNA BX W/US GDN 1ST LES: CPT | Mod: 26 | Performed by: INTERNAL MEDICINE

## 2024-01-22 PROCEDURE — 88173 CYTOPATH EVAL FNA REPORT: CPT

## 2024-01-22 NOTE — PROGRESS NOTES
POC US guided FNA procedure was completed today for the 1.3 cm complex nodule on the RUL with irregular margins and echogenic foci   Please see endocrinologist procedure note  Patient tolerated procedure well without complaints.  Patient was advised that she will be contacted regarding the results and next plan  Patient was advised to follow up as planned with labs prior     Bernabe Mann M.D.

## 2024-03-04 ENCOUNTER — APPOINTMENT (OUTPATIENT)
Dept: RADIOLOGY | Facility: MEDICAL CENTER | Age: 64
End: 2024-03-04
Attending: STUDENT IN AN ORGANIZED HEALTH CARE EDUCATION/TRAINING PROGRAM
Payer: COMMERCIAL

## 2024-03-04 DIAGNOSIS — Z12.31 ENCOUNTER FOR SCREENING MAMMOGRAM FOR MALIGNANT NEOPLASM OF BREAST: ICD-10-CM

## 2024-03-04 PROCEDURE — 77067 SCR MAMMO BI INCL CAD: CPT

## 2024-03-23 DIAGNOSIS — E78.2 MIXED HYPERLIPIDEMIA: Chronic | ICD-10-CM

## 2024-03-26 RX ORDER — ATORVASTATIN CALCIUM 20 MG/1
TABLET, FILM COATED ORAL
Qty: 90 TABLET | Refills: 3 | Status: SHIPPED | OUTPATIENT
Start: 2024-03-26

## 2024-06-10 ENCOUNTER — OFFICE VISIT (OUTPATIENT)
Dept: OPHTHALMOLOGY | Facility: MEDICAL CENTER | Age: 64
End: 2024-06-10
Payer: COMMERCIAL

## 2024-06-10 DIAGNOSIS — H40.003 GLAUCOMA SUSPECT OF BOTH EYES: ICD-10-CM

## 2024-06-10 DIAGNOSIS — H43.813 PVD (POSTERIOR VITREOUS DETACHMENT), BILATERAL: ICD-10-CM

## 2024-06-10 DIAGNOSIS — H35.54 RETINAL PIGMENT EPITHELIAL ATROPHY: ICD-10-CM

## 2024-06-10 DIAGNOSIS — H25.13 AGE-RELATED NUCLEAR CATARACT OF BOTH EYES: ICD-10-CM

## 2024-06-10 DIAGNOSIS — H02.834 DERMATOCHALASIS OF LEFT UPPER EYELID: ICD-10-CM

## 2024-06-10 DIAGNOSIS — H52.13 MYOPIA OF BOTH EYES: ICD-10-CM

## 2024-06-10 PROCEDURE — 92250 FUNDUS PHOTOGRAPHY W/I&R: CPT | Performed by: OPHTHALMOLOGY

## 2024-06-10 PROCEDURE — 99213 OFFICE O/P EST LOW 20 MIN: CPT | Mod: 25 | Performed by: OPHTHALMOLOGY

## 2024-06-10 ASSESSMENT — REFRACTION_WEARINGRX
OS_CYLINDER: +1.50
OD_AXIS: 085
OS_SPHERE: -2.25
OS_AXIS: 090
OD_ADD: +2.75
SPECS_TYPE: TRIFOCAL
OD_CYLINDER: +2.00
OD_SPHERE: -2.00
OS_ADD: +2.75

## 2024-06-10 ASSESSMENT — CONF VISUAL FIELD
OD_INFERIOR_TEMPORAL_RESTRICTION: 0
OS_INFERIOR_TEMPORAL_RESTRICTION: 0
OS_INFERIOR_NASAL_RESTRICTION: 0
OD_SUPERIOR_NASAL_RESTRICTION: 0
OS_SUPERIOR_TEMPORAL_RESTRICTION: 0
OD_SUPERIOR_TEMPORAL_RESTRICTION: 0
OS_NORMAL: 1
OD_NORMAL: 1
OD_INFERIOR_NASAL_RESTRICTION: 0
OS_SUPERIOR_NASAL_RESTRICTION: 0

## 2024-06-10 ASSESSMENT — VISUAL ACUITY
OS_CC: J1+
OD_CC: 20/20
OS_CC: 20/20-2
METHOD: SNELLEN - LINEAR
OD_CC: J1+

## 2024-06-10 ASSESSMENT — PACHYMETRY
OS_CT(UM): 16
OD_CT(UM): 16

## 2024-06-10 ASSESSMENT — CUP TO DISC RATIO
OS_RATIO: 0.2
OD_RATIO: 0.2

## 2024-06-10 ASSESSMENT — REFRACTION_MANIFEST
OD_CYLINDER: +2.25
OD_AXIS: 086
METHOD_AUTOREFRACTION: 1
OS_SPHERE: -1.50
OS_CYLINDER: +1.50
OD_SPHERE: -1.25
OS_AXIS: 100

## 2024-06-10 ASSESSMENT — ENCOUNTER SYMPTOMS: BLURRED VISION: 1

## 2024-06-10 ASSESSMENT — EXTERNAL EXAM - LEFT EYE: OS_EXAM: NORMAL

## 2024-06-10 ASSESSMENT — SLIT LAMP EXAM - LIDS
COMMENTS: PTOSIS, DERMATOCHALASIS - UPPER LID
COMMENTS: NORMAL

## 2024-06-10 ASSESSMENT — EXTERNAL EXAM - RIGHT EYE: OD_EXAM: NORMAL

## 2024-06-10 NOTE — ASSESSMENT & PLAN NOTE
2/8/2021 - early ns cataracts. No visually signficant  4/25/2022 - slight progression  4/24/2023 -no significant progression since last year  6/10/2024 -overall stable.  No significant progression.  Still 20/20 minus with glasses Rx

## 2024-06-10 NOTE — ASSESSMENT & PLAN NOTE
4/25/2022 - No change in rx  4/24/2023 -never got the new Rx.  No change.  Will give Rx.  Slight increase in bifocal.  6/10/2024 -continue current Rx

## 2024-06-10 NOTE — PROGRESS NOTES
Peds/Neuro Ophthalmology:   Daniel Tatum M.D.    Date & Time note created:    6/10/2024   2:36 PM     Referring MD / APRN:  Felix Campos D.O., No att. providers found    Patient ID:  Name:             Lily aSwant   YOB: 1960  Age:                 64 y.o.  female   MRN:               [id number]    Chief Complaint/Reason for Visit:     Other (Cataracts)      History of Present Illness:    Lily Sawant is a 64 y.o. female   Follow up visit today.Patient states vision not as crisp for distance.+ floaters both eyes but not any worse.Dry eyes being treated with art tears.        Review of Systems:  Review of Systems   Eyes:  Positive for blurred vision.        Dry eyes   All other systems reviewed and are negative.      Past Medical History:   Past Medical History:   Diagnosis Date    Celiac disease 02/13/2015    Family history of ischemic heart disease (IHD)     FH: renal cell carcinoma 02/13/2015    High cholesterol     Hyperlipidemia 02/13/2015    Hypertension     Migraine headache 02/13/2015    Thyroid disease     nodules       Past Surgical History:  Past Surgical History:   Procedure Laterality Date    HYSTEROSCOPY WITH MYOSURE N/A 8/23/2023    Procedure: HYSTEROSCOPY DILATION AND CURETTAGE, POLYPECTOMY;  Surgeon: Vivian De La Paz D.O.;  Location: SURGERY SAME DAY [facility];  Service: Obstetrics    DILATION AND CURETTAGE N/A 8/23/2023    Procedure: DILATION AND CURETTAGE;  Surgeon: Vivian De La Paz D.O.;  Location: SURGERY SAME DAY [facility];  Service: Obstetrics    DILATION AND EVACUATION  1985 & 1987    TONSILLECTOMY         Current Outpatient Medications:  Current Outpatient Medications   Medication Sig Dispense Refill    atorvastatin (LIPITOR) 20 MG Tab TAKE 1 TABLET BY MOUTH ONCE DAILY AT BEDTIME 90 Tablet 3    Multiple Vitamins-Minerals (WOMENS DAILY FORMULA PO) Take  by mouth.      Cholecalciferol (VITAMIN D) 2000 units Cap Take 2,000 Units by mouth every day.       aspirin EC (ECOTRIN) 81 MG Tablet Delayed Response Take 81 mg by mouth every day.       No current facility-administered medications for this visit.       Allergies:  Allergies   Allergen Reactions    Gluten Meal Diarrhea    Pineapple Diarrhea       Family History:  Family History   Problem Relation Age of Onset    Arthritis Mother     Glasses Mother     Other Mother     Heart Disease Mother     Cancer Sister         Melanoma    Other Sister     Cancer Father         Renal & Lung    Lung Disease Father     Glasses Father     Cancer Brother         Melanoma - eye    Other Brother     Glaucoma Brother     Glasses Child     Other Child         SIDS    Glasses Child     Glasses Child     Cancer Paternal Grandfather         Renal    Heart Disease Maternal Grandmother     Heart Attack Maternal Uncle 65        MI       Social History:  Social History     Socioeconomic History    Marital status:      Spouse name: Not on file    Number of children: Not on file    Years of education: Not on file    Highest education level: Master's degree (e.g., MA, MS, Lefty, MEd, MSW, GARY)   Occupational History    Not on file   Tobacco Use    Smoking status: Former     Current packs/day: 0.00     Average packs/day: 0.3 packs/day for 3.0 years (0.8 ttl pk-yrs)     Types: Cigarettes     Start date: 1975     Quit date: 1978     Years since quittin.3    Smokeless tobacco: Never   Vaping Use    Vaping status: Never Used   Substance and Sexual Activity    Alcohol use: Yes     Alcohol/week: 3.0 oz     Types: 5 Glasses of wine per week     Comment: weekly    Drug use: No    Sexual activity: Yes     Partners: Male     Birth control/protection: None   Other Topics Concern    Not on file   Social History Narrative    , has 3 kids     works part time      Social Determinants of Health     Financial Resource Strain: Low Risk  (2022)    Overall Financial Resource Strain (CARDIA)     Difficulty of Paying Living  Expenses: Not hard at all   Food Insecurity: No Food Insecurity (12/13/2022)    Hunger Vital Sign     Worried About Running Out of Food in the Last Year: Never true     Ran Out of Food in the Last Year: Never true   Transportation Needs: No Transportation Needs (12/13/2022)    PRAPARE - Transportation     Lack of Transportation (Medical): No     Lack of Transportation (Non-Medical): No   Physical Activity: Sufficiently Active (12/13/2022)    Exercise Vital Sign     Days of Exercise per Week: 6 days     Minutes of Exercise per Session: 60 min   Stress: No Stress Concern Present (12/13/2022)    [place] [employer] - Occupational Stress Questionnaire     Feeling of Stress : Not at all   Social Connections: Socially Integrated (12/13/2022)    Social Connection and Isolation Panel [NHANES]     Frequency of Communication with Friends and Family: More than three times a week     Frequency of Social Gatherings with Friends and Family: More than three times a week     Attends Orthodoxy Services: More than 4 times per year     Active Member of Clubs or Organizations: Not on file     Attends Club or Organization Meetings: More than 4 times per year     Marital Status:    Intimate Partner Violence: Not on file   Housing Stability: Unknown (12/13/2022)    Housing Stability Vital Sign     Unable to Pay for Housing in the Last Year: No     Number of Places Lived in the Last Year: Not on file     Unstable Housing in the Last Year: No          Physical Exam:  Physical Exam    Oriented x 3  Weight/BMI: There is no height or weight on file to calculate BMI.  There were no vitals taken for this visit.    Base Eye Exam       Visual Acuity (Snellen - Linear)         Right Left    Dist cc 20/20 20/20-2    Near cc J1+ J1+              Pupils         Pupils    Right PERRL    Left PERRL              Visual Fields         Right Left     Full Full              Extraocular Movement         Right Left     Full, Ortho  Full, Ortho              Neuro/Psych       Oriented x3: Yes    Mood/Affect: Normal                  Additional Tests       Color         Right Left    Levi 9/9 9/9              Stereo       Fly: +    Animals: 3/3    Circles: 9/9                  Slit Lamp and Fundus Exam       External Exam         Right Left    External Normal Normal              Slit Lamp Exam         Right Left    Lids/Lashes Normal Ptosis, Dermatochalasis - upper lid    Conjunctiva/Sclera White and quiet White and quiet    Cornea Clear Clear    Anterior Chamber Deep and quiet Deep and quiet    Iris Round and reactive Round and reactive    Lens Nuclear sclerosis Nuclear sclerosis    Vitreous Normal Normal              Fundus Exam         Right Left    Disc Normal Normal    C/D Ratio 0.2 0.2    Macula Normal Normal    Vessels Normal Normal    Periphery PVD, Pigmentation PVD, Pigmentation                  Refraction       Wearing Rx         Sphere Cylinder Axis Add    Right -2.00 +2.00 085 +2.75    Left -2.25 +1.50 090 +2.75      Type: Trifocal              Manifest Refraction (Auto)         Sphere Cylinder Axis    Right -1.25 +2.25 086    Left -1.50 +1.50 100                    Pertinent Lab/Test/Imaging Review:      Assessment and Plan:     Glaucoma suspect of both eyes  2/8/2021 - OCT NFL thickness normal at 80 OD and 79 OS, stable  4/25/2022 - Ave NFl thickness stable at 79 OD and 76 OS, with tilting.   4/24/2023 -IOP normal  6/10/2024 -IOP stable no change in OCT neurofibrillary thickness.  80 OD 75 OS.  Stable tilted nerve especially on the left with peripapillary crescent    Myopia of both eyes  4/25/2022 - No change in rx  4/24/2023 -never got the new Rx.  No change.  Will give Rx.  Slight increase in bifocal.  6/10/2024 -continue current Rx    Age-related nuclear cataract of both eyes  2/8/2021 - early ns cataracts. No visually signficant  4/25/2022 - slight progression  4/24/2023 -no significant progression since last year  6/10/2024  -overall stable.  No significant progression.  Still 20/20 minus with glasses Rx    PVD (posterior vitreous detachment), bilateral  8/7/2019 - Posterior vitreous detachments most likely leading to flashes and floaters. No retinal holes or tears, no peripheral retinal pigmentary changes. Also obtain OCT NFL thickness that was 79 OD and 79 OS with normal ganglion cell thickness, so no evidence of an optic neuropathy or retrograde axonal degeneration. Thus discussed the warning signs of retinal holes and tears. Recommended re-eval retina in 6 months or sooner if symptoms worsen.   2/10/2020 - Overall stable. Some minimal peripheral pigmentary changes, but I do not detect any tears of holes. Would like to re-eval in 1 years. Discussed that if symptoms worsen will refer to retina.  2/8/2021 - improved, no tears or holes  4/25/2022 - Stable, no tears or holes  6/10/2024 -no progression.    Dermatochalasis of left upper eyelid  2/8/2021 - mild left upper lid dermatochalasis leading to some ptosis. No obscuring visual axis.   4/25/2022 - gave name of Daniel Wil  4/24/2023--overall stable  6/10/2024-no progression    Retinal pigment epithelial atrophy  6/10/2024-stable retinal pigment hypoplasia outside of posterior pole.  No central RPE changes        Daniel Tatum M.D.

## 2024-06-10 NOTE — ASSESSMENT & PLAN NOTE
2/8/2021 - OCT NFL thickness normal at 80 OD and 79 OS, stable  4/25/2022 - Ave NFl thickness stable at 79 OD and 76 OS, with tilting.   4/24/2023 -IOP normal  6/10/2024 -IOP stable no change in OCT neurofibrillary thickness.  80 OD 75 OS.  Stable tilted nerve especially on the left with peripapillary crescent

## 2024-06-10 NOTE — ASSESSMENT & PLAN NOTE
8/7/2019 - Posterior vitreous detachments most likely leading to flashes and floaters. No retinal holes or tears, no peripheral retinal pigmentary changes. Also obtain OCT NFL thickness that was 79 OD and 79 OS with normal ganglion cell thickness, so no evidence of an optic neuropathy or retrograde axonal degeneration. Thus discussed the warning signs of retinal holes and tears. Recommended re-eval retina in 6 months or sooner if symptoms worsen.   2/10/2020 - Overall stable. Some minimal peripheral pigmentary changes, but I do not detect any tears of holes. Would like to re-eval in 1 years. Discussed that if symptoms worsen will refer to retina.  2/8/2021 - improved, no tears or holes  4/25/2022 - Stable, no tears or holes  6/10/2024 -no progression.

## 2024-06-10 NOTE — PROCEDURES
Stable peripheral pigmentary changes both eyes.  Tilted optic nerve heads with peripapillary crescent left eye stable

## 2024-06-10 NOTE — ASSESSMENT & PLAN NOTE
2/8/2021 - mild left upper lid dermatochalasis leading to some ptosis. No obscuring visual axis.   4/25/2022 - gave name of Daniel De La Torre  4/24/2023--overall stable  6/10/2024-no progression

## 2024-06-12 ENCOUNTER — HOSPITAL ENCOUNTER (OUTPATIENT)
Facility: MEDICAL CENTER | Age: 64
End: 2024-06-12
Attending: OBSTETRICS & GYNECOLOGY
Payer: COMMERCIAL

## 2024-06-12 ENCOUNTER — APPOINTMENT (OUTPATIENT)
Dept: OBGYN | Facility: CLINIC | Age: 64
End: 2024-06-12
Payer: COMMERCIAL

## 2024-06-12 VITALS — BODY MASS INDEX: 22.27 KG/M2 | DIASTOLIC BLOOD PRESSURE: 66 MMHG | SYSTOLIC BLOOD PRESSURE: 131 MMHG | WEIGHT: 138 LBS

## 2024-06-12 DIAGNOSIS — N95.2 VAGINAL ATROPHY: ICD-10-CM

## 2024-06-12 DIAGNOSIS — Z01.419 WOMEN'S ANNUAL ROUTINE GYNECOLOGICAL EXAMINATION: Primary | ICD-10-CM

## 2024-06-12 DIAGNOSIS — Z12.4 SCREENING FOR MALIGNANT NEOPLASM OF CERVIX: ICD-10-CM

## 2024-06-12 PROBLEM — N93.9 VAGINAL BLEEDING: Status: RESOLVED | Noted: 2023-06-07 | Resolved: 2024-06-12

## 2024-06-12 PROBLEM — N84.0 ENDOMETRIAL POLYP: Status: RESOLVED | Noted: 2023-08-01 | Resolved: 2024-06-12

## 2024-06-12 PROBLEM — N95.0 POSTMENOPAUSAL BLEEDING: Status: RESOLVED | Noted: 2023-08-01 | Resolved: 2024-06-12

## 2024-06-12 PROCEDURE — 3078F DIAST BP <80 MM HG: CPT | Performed by: OBSTETRICS & GYNECOLOGY

## 2024-06-12 PROCEDURE — 3075F SYST BP GE 130 - 139MM HG: CPT | Performed by: OBSTETRICS & GYNECOLOGY

## 2024-06-12 PROCEDURE — 87624 HPV HI-RISK TYP POOLED RSLT: CPT

## 2024-06-12 PROCEDURE — 88175 CYTOPATH C/V AUTO FLUID REDO: CPT

## 2024-06-12 PROCEDURE — 99397 PER PM REEVAL EST PAT 65+ YR: CPT | Performed by: OBSTETRICS & GYNECOLOGY

## 2024-06-12 RX ORDER — ESTRADIOL 0.1 MG/G
CREAM VAGINAL
Qty: 42.5 G | Refills: 3 | Status: SHIPPED | OUTPATIENT
Start: 2024-06-12

## 2024-06-12 ASSESSMENT — FIBROSIS 4 INDEX: FIB4 SCORE: 1.48

## 2024-06-12 NOTE — PROGRESS NOTES
ANNUAL GYNECOLOGY VISIT    HPI:  Patient is a 64 y.o.  who presents for her annual exam. She has no complaints.  She has not had any further vaginal bleeding after hysteroscopy/polypectomy in August last year.  She remains sexually active without much concern.  They use lubricant and that seems sufficient.        PREVENTIVE CARE:  Immunization History   Administered Date(s) Administered    Covid-19 Mrna (Spikevax) Moderna 12+ Years 10/03/2023    Hepatitis B Vaccine (Adol/Adult) 10/14/2010, 2023    Influenza Vaccine Quad Inj (Pf) 2016, 10/11/2017, 2018, 10/24/2019, 10/15/2020, 10/19/2021    Influenza Vaccine Quad Inj (Preserved) 2016    Influenza Vaccine Quad Recombinant 2022    MODERNA BIVALENT BOOSTER SARS-COV-2 VACCINE (6+) 2022    PFIZER PURPLE CAP SARS-COV-2 VACCINATION (12+) 01/15/2021, 2021    Pneumococcal Conjugate Vaccine (PCV20) 08/10/2023    Tdap Vaccine 2015    Tetanus Vaccine - HISTORICAL DATA 2004    Zoster Vaccine Live (ZVL) (Zostavax) - HISTORICAL DATA 10/21/2016    Zoster Vaccine Recombinant (RZV) (SHINGRIX) 2019, 2019     Last Mammogram: 3/2024  Taking Calcium/Vit D Supp: yes    Personal Hx of fracture as an adult: No  Hx of fracture in first-degree relative: No   race: Yes  Dementia: No  Poor health/frailty: No  Recurrent falls: No  Tobacco use: No  Low body weight (<127 lbs): No  Early menopause (age <45) or BSO: No  Alcoholism:No  Inadequate physical activity: No  Last DEXA: 2022; has rpt scheduled  According to the World Health Organization classification, bone mineral density of this patient is normal in the left femur and lumbar spine with no significant interval change.     10-year Probability of Fracture:  Major Osteoporotic     26.3%  Hip     1.6%  Population      [place] ()      CANCER RISK ASSESSMENT:  Family history of:   - Breast cancer: no   - Ovarian cancer: no   - Uterine cancer: no   - Colon  cancer: no    GYN HX and ROS:   Last Pap: 2021 - pap wnl; no cotest  Failed to redirect to the Timeline version of the REVFS SmartLink.  Hx Mod or Severe Dysplasia : No  Sexually Active: yes    Postmenopausal bleeding: No  Sexual problems: No  Significant pelvic pain: No  Bothersome menopausal symptoms: No      ROS:    Positive ROS: none  General: She denies excessive fatigue, weakness, unexplained weight loss or gain, abnormal thirst, unexplained fever/chills.  Neurologic: She denies fainting spells, convulsions, dizzy spells, frequent severe headaches, depression or anxiety or vision changes.  HEENT: She denies unusual skin moles, persistent swollen glands, pain or stiff neck, goiter or lump in her neck.  Heart / Lung: She denies persistent cough, shortness of breath, severe chest pain or recurrent heart flutters.  Breast: She denies breast discharge, pain, lump or lump under her arm.  Gastrointestinal: She denies bloody stools, loss of appetite, change in bowel habits, constipation, diarrhea, nausea, vomiting or persistent abdominal pain.  Urinary: She denies dysuria, urgency, frequency, incontinence, hematuria, kidney or bladder infections.  Extremeties: She denies joint pain, persistently swollen ankles or recurrent leg cramps.        OBSTETRIC HISTORY:  OB History    Para Term  AB Living   5 3 2 1 2 3   SAB IAB Ectopic Molar Multiple Live Births   2       1 4      # Outcome Date GA Lbr Elton/2nd Weight Sex Delivery Anes PTL Lv   5 Term 89 40w0d  9 lb 8 oz F Vag-Spont  N TESFAYE   4A  89 36w0d  6 lb M Vag-Spont  Y TESFAYE   4B  89 36w0d  5 lb M Vag-Spont  N ND   3 Term 84 40w0d  10 lb M Vag-Spont  N TESFAYE      Complications: Fetal Intolerance   2 SAB            1 SAB                MEDICAL HISTORY:  Past Medical History:   Diagnosis Date    Celiac disease 2015    Family history of ischemic heart disease (IHD)     FH: renal cell carcinoma 2015    High  cholesterol     Hyperlipidemia 02/13/2015    Hypertension     Migraine headache 02/13/2015    Thyroid disease     nodules       MEDICATIONS:  Current Outpatient Medications on File Prior to Visit   Medication Sig Dispense Refill    atorvastatin (LIPITOR) 20 MG Tab TAKE 1 TABLET BY MOUTH ONCE DAILY AT BEDTIME 90 Tablet 3    Multiple Vitamins-Minerals (WOMENS DAILY FORMULA PO) Take  by mouth.      Cholecalciferol (VITAMIN D) 2000 units Cap Take 2,000 Units by mouth every day.      aspirin EC (ECOTRIN) 81 MG Tablet Delayed Response Take 81 mg by mouth every day.       No current facility-administered medications on file prior to visit.       ALLERGIES / REACTIONS:  Allergies   Allergen Reactions    Gluten Meal Diarrhea    Pineapple Diarrhea       FAMILY HISTORY:  Family History   Problem Relation Age of Onset    Arthritis Mother     Glasses Mother     Other Mother     Heart Disease Mother     Cancer Sister         Melanoma    Other Sister     Cancer Father         Renal & Lung    Lung Disease Father     Glasses Father     Cancer Brother         Melanoma - eye    Other Brother     Glaucoma Brother     Glasses Child     Other Child         SIDS    Glasses Child     Glasses Child     Cancer Paternal Grandfather         Renal    Heart Disease Maternal Grandmother     Heart Attack Maternal Uncle 65        MI       SURGICAL HISTORY:  Past Surgical History:   Procedure Laterality Date    HYSTEROSCOPY WITH MYOSURE N/A 8/23/2023    Procedure: HYSTEROSCOPY DILATION AND CURETTAGE, POLYPECTOMY;  Surgeon: Vivian De La Paz D.O.;  Location: SURGERY SAME DAY [facility];  Service: Obstetrics    DILATION AND CURETTAGE N/A 8/23/2023    Procedure: DILATION AND CURETTAGE;  Surgeon: Vivian De La Paz D.O.;  Location: SURGERY SAME DAY [facility];  Service: Obstetrics    DILATION AND EVACUATION  1985 & 1987    TONSILLECTOMY         SOCIAL HISTORY:  Social History     Socioeconomic History    Marital status:      Spouse name: Not on file     Number of children: Not on file    Years of education: Not on file    Highest education level: Master's degree (e.g., MA, MS, Lefty, MEd, MSW, GARY)   Occupational History    Not on file   Tobacco Use    Smoking status: Former     Current packs/day: 0.00     Average packs/day: 0.3 packs/day for 3.0 years (0.8 ttl pk-yrs)     Types: Cigarettes     Start date: 1975     Quit date: 1978     Years since quittin.3    Smokeless tobacco: Never   Vaping Use    Vaping status: Never Used   Substance and Sexual Activity    Alcohol use: Yes     Alcohol/week: 3.0 oz     Types: 5 Glasses of wine per week     Comment: weekly    Drug use: No    Sexual activity: Yes     Partners: Male     Birth control/protection: None   Other Topics Concern    Not on file   Social History Narrative    , has 3 kids     works part time      Social Determinants of Health     Financial Resource Strain: Low Risk  (2022)    Overall Financial Resource Strain (CARDIA)     Difficulty of Paying Living Expenses: Not hard at all   Food Insecurity: No Food Insecurity (2022)    Hunger Vital Sign     Worried About Running Out of Food in the Last Year: Never true     Ran Out of Food in the Last Year: Never true   Transportation Needs: No Transportation Needs (2022)    PRAPARE - Transportation     Lack of Transportation (Medical): No     Lack of Transportation (Non-Medical): No   Physical Activity: Sufficiently Active (2022)    Exercise Vital Sign     Days of Exercise per Week: 6 days     Minutes of Exercise per Session: 60 min   Stress: No Stress Concern Present (2022)    [place] [employer] - Occupational Stress Questionnaire     Feeling of Stress : Not at all   Social Connections: Socially Integrated (2022)    Social Connection and Isolation Panel [NHANES]     Frequency of Communication with Friends and Family: More than three times a week     Frequency of Social Gatherings with  Friends and Family: More than three times a week     Attends Sikh Services: More than 4 times per year     Active Member of Clubs or Organizations: Not on file     Attends Club or Organization Meetings: More than 4 times per year     Marital Status:    Intimate Partner Violence: Not on file   Housing Stability: Unknown (2022)    Housing Stability Vital Sign     Unable to Pay for Housing in the Last Year: No     Number of Places Lived in the Last Year: Not on file     Unstable Housing in the Last Year: No         PHYSICAL EXAMINATION:  Vital Signs:   Vitals:    24 0818   BP: 131/66   BP Location: Right arm   Patient Position: Sitting   BP Cuff Size: Adult   Weight: 138 lb     Body mass index is 22.27 kg/m².  Constitutional: The patient is well developed and well nourished.  Psychiatric: Patient is oriented to time place and person.   Skin: No rash observed.  Neck: Appears symmetric. There are no masses or adenopathy present.  Cardiovascular: Regular rate and rhythm without murmur.  Lungs: Clear to ausculation.  Breast: Inspection reveals no asymetry or nipple discharge, no skin thickening, dimpling or erythema.  Palpation demonstrates no masses.  Abdomen: Soft, non-tender.  Pelvic Exam:     Vulva: External female genitalia within normal limits. No lesions; moderate vulvar atrophy with very pale skin    Urethra - no lesions, no erythema; atrophy    Vagina: moist, pale pink, decreased ruggae;     Cervix: pink, smooth, no lesions, no CMT    Uterus - non-tender, normal size, shape, contour, mobile, anteverted    Ovaries: non-tender, no appreciable masses  Pap Smear Performed: {Yes  Extremeties: Legs are symmetric and without tenderness. There is no edema present.    LABS:  TSH   Date Value Ref Range Status   2022 2.190 0.450 - 4.500 uIU/mL Final     HDL (mg/dL)   Date Value   2023 95     LDL (mg/dL)   Date Value   2023 87       ASSESSMENT AND PLAN:  64 y.o. .here for  annual exam    1. Women's annual routine gynecological examination  Breast and pelvic exam completed  Pap: last pap done today with cotesting. If normal, may stop.  Pt did not have cotesting on last pap so pap was repeated today Neg cotest in 2017  Mammogram: up to date  Advised on breast self awareness  Advised on flu and/or covid vaccines in season  Advised on calcium and vit D intake      2. Screening for malignant neoplasm of cervix  - THINPREP PAP WITH HPV; Future    3. Vaginal atrophy   - relatively asymptomatic but given still sexually active and marked atrophy on exam, discussed that she might find benefit by trying vaginal estrogen   - reviewed use, low risks    - estradiol (ESTRACE) 0.1 MG/GM vaginal cream; Insert 0.5-1g nightly x 1 week then twice weekly thereafter for maintenance therapy  Dispense: 42.5 g; Refill: 3      Follow up: Annually    Vivian De LaP az D.O.

## 2024-06-12 NOTE — PROGRESS NOTES
Patient here for annual exam  Last pap done/result:4/1/2021 - normal   LMP: post menopausal   BCM:post menopausal   Last mammogram, if applicable:  Phone number: [phone]  Pharmacy verified

## 2024-06-13 DIAGNOSIS — Z12.4 SCREENING FOR MALIGNANT NEOPLASM OF CERVIX: ICD-10-CM

## 2024-06-17 LAB
CYTOLOGIST CVX/VAG CYTO: NORMAL
CYTOLOGY CVX/VAG DOC CYTO: NORMAL
CYTOLOGY CVX/VAG DOC THIN PREP: NORMAL
HPV I/H RISK 4 DNA CVX QL PROBE+SIG AMP: NEGATIVE
NOTE NL11727A: NORMAL
OTHER STN SPEC: NORMAL
STAT OF ADQ CVX/VAG CYTO-IMP: NORMAL

## 2024-08-02 ENCOUNTER — APPOINTMENT (OUTPATIENT)
Dept: LAB | Facility: MEDICAL CENTER | Age: 64
End: 2024-08-02
Payer: COMMERCIAL

## 2024-08-02 ENCOUNTER — HOSPITAL ENCOUNTER (OUTPATIENT)
Dept: LAB | Facility: MEDICAL CENTER | Age: 64
End: 2024-08-02
Attending: STUDENT IN AN ORGANIZED HEALTH CARE EDUCATION/TRAINING PROGRAM
Payer: COMMERCIAL

## 2024-08-02 DIAGNOSIS — M85.80 OSTEOPENIA, UNSPECIFIED LOCATION: ICD-10-CM

## 2024-08-02 DIAGNOSIS — E04.2 MULTIPLE THYROID NODULES: ICD-10-CM

## 2024-08-02 DIAGNOSIS — E55.9 VITAMIN D DEFICIENCY: ICD-10-CM

## 2024-08-02 DIAGNOSIS — Z00.00 HEALTHCARE MAINTENANCE: ICD-10-CM

## 2024-08-02 LAB
25(OH)D3 SERPL-MCNC: 60 NG/ML (ref 30–100)
25(OH)D3 SERPL-MCNC: 60 NG/ML (ref 30–100)
ALBUMIN SERPL BCP-MCNC: 4.4 G/DL (ref 3.2–4.9)
ALBUMIN SERPL BCP-MCNC: 4.4 G/DL (ref 3.2–4.9)
ALBUMIN/GLOB SERPL: 1.7 G/DL
ALBUMIN/GLOB SERPL: 1.7 G/DL
ALP SERPL-CCNC: 56 U/L (ref 30–99)
ALP SERPL-CCNC: 56 U/L (ref 30–99)
ALT SERPL-CCNC: 14 U/L (ref 2–50)
ALT SERPL-CCNC: 15 U/L (ref 2–50)
ANION GAP SERPL CALC-SCNC: 11 MMOL/L (ref 7–16)
ANION GAP SERPL CALC-SCNC: 11 MMOL/L (ref 7–16)
AST SERPL-CCNC: 25 U/L (ref 12–45)
AST SERPL-CCNC: 25 U/L (ref 12–45)
BASOPHILS # BLD AUTO: 0.8 % (ref 0–1.8)
BASOPHILS # BLD: 0.04 K/UL (ref 0–0.12)
BILIRUB SERPL-MCNC: 0.5 MG/DL (ref 0.1–1.5)
BILIRUB SERPL-MCNC: 0.5 MG/DL (ref 0.1–1.5)
BUN SERPL-MCNC: 16 MG/DL (ref 8–22)
BUN SERPL-MCNC: 16 MG/DL (ref 8–22)
CALCIUM ALBUM COR SERPL-MCNC: 9 MG/DL (ref 8.5–10.5)
CALCIUM ALBUM COR SERPL-MCNC: 9 MG/DL (ref 8.5–10.5)
CALCIUM SERPL-MCNC: 9.3 MG/DL (ref 8.5–10.5)
CALCIUM SERPL-MCNC: 9.3 MG/DL (ref 8.5–10.5)
CHLORIDE SERPL-SCNC: 101 MMOL/L (ref 96–112)
CHLORIDE SERPL-SCNC: 101 MMOL/L (ref 96–112)
CHOLEST SERPL-MCNC: 208 MG/DL (ref 100–199)
CO2 SERPL-SCNC: 24 MMOL/L (ref 20–33)
CO2 SERPL-SCNC: 24 MMOL/L (ref 20–33)
CREAT SERPL-MCNC: 0.83 MG/DL (ref 0.5–1.4)
CREAT SERPL-MCNC: 0.85 MG/DL (ref 0.5–1.4)
EOSINOPHIL # BLD AUTO: 0.34 K/UL (ref 0–0.51)
EOSINOPHIL NFR BLD: 6.7 % (ref 0–6.9)
ERYTHROCYTE [DISTWIDTH] IN BLOOD BY AUTOMATED COUNT: 43.9 FL (ref 35.9–50)
EST. AVERAGE GLUCOSE BLD GHB EST-MCNC: 111 MG/DL
FASTING STATUS PATIENT QL REPORTED: NORMAL
GFR SERPLBLD CREATININE-BSD FMLA CKD-EPI: 75 ML/MIN/1.73 M 2
GFR SERPLBLD CREATININE-BSD FMLA CKD-EPI: 78 ML/MIN/1.73 M 2
GLOBULIN SER CALC-MCNC: 2.6 G/DL (ref 1.9–3.5)
GLOBULIN SER CALC-MCNC: 2.6 G/DL (ref 1.9–3.5)
GLUCOSE SERPL-MCNC: 85 MG/DL (ref 65–99)
GLUCOSE SERPL-MCNC: 86 MG/DL (ref 65–99)
HBA1C MFR BLD: 5.5 % (ref 4–5.6)
HCT VFR BLD AUTO: 41.7 % (ref 37–47)
HDLC SERPL-MCNC: 98 MG/DL
HGB BLD-MCNC: 13.4 G/DL (ref 12–16)
IMM GRANULOCYTES # BLD AUTO: 0.01 K/UL (ref 0–0.11)
IMM GRANULOCYTES NFR BLD AUTO: 0.2 % (ref 0–0.9)
LDLC SERPL CALC-MCNC: 99 MG/DL
LYMPHOCYTES # BLD AUTO: 1.99 K/UL (ref 1–4.8)
LYMPHOCYTES NFR BLD: 39.2 % (ref 22–41)
MCH RBC QN AUTO: 31.2 PG (ref 27–33)
MCHC RBC AUTO-ENTMCNC: 32.1 G/DL (ref 32.2–35.5)
MCV RBC AUTO: 97 FL (ref 81.4–97.8)
MONOCYTES # BLD AUTO: 0.4 K/UL (ref 0–0.85)
MONOCYTES NFR BLD AUTO: 7.9 % (ref 0–13.4)
NEUTROPHILS # BLD AUTO: 2.3 K/UL (ref 1.82–7.42)
NEUTROPHILS NFR BLD: 45.2 % (ref 44–72)
NRBC # BLD AUTO: 0 K/UL
NRBC BLD-RTO: 0 /100 WBC (ref 0–0.2)
PLATELET # BLD AUTO: 234 K/UL (ref 164–446)
PMV BLD AUTO: 10.3 FL (ref 9–12.9)
POTASSIUM SERPL-SCNC: 4.1 MMOL/L (ref 3.6–5.5)
POTASSIUM SERPL-SCNC: 4.1 MMOL/L (ref 3.6–5.5)
PROT SERPL-MCNC: 7 G/DL (ref 6–8.2)
PROT SERPL-MCNC: 7 G/DL (ref 6–8.2)
PTH-INTACT SERPL-MCNC: 51.5 PG/ML (ref 14–72)
RBC # BLD AUTO: 4.3 M/UL (ref 4.2–5.4)
SODIUM SERPL-SCNC: 136 MMOL/L (ref 135–145)
SODIUM SERPL-SCNC: 136 MMOL/L (ref 135–145)
T4 FREE SERPL-MCNC: 1.25 NG/DL (ref 0.93–1.7)
TRIGL SERPL-MCNC: 56 MG/DL (ref 0–149)
TSH SERPL DL<=0.005 MIU/L-ACNC: 2.7 UIU/ML (ref 0.38–5.33)
TSH SERPL-ACNC: 2.58 UIU/ML (ref 0.35–5.5)
WBC # BLD AUTO: 5.1 K/UL (ref 4.8–10.8)

## 2024-08-02 PROCEDURE — 84443 ASSAY THYROID STIM HORMONE: CPT | Mod: 91

## 2024-08-02 PROCEDURE — 82306 VITAMIN D 25 HYDROXY: CPT | Mod: 91

## 2024-08-02 PROCEDURE — 36415 COLL VENOUS BLD VENIPUNCTURE: CPT

## 2024-08-02 PROCEDURE — 83970 ASSAY OF PARATHORMONE: CPT

## 2024-08-02 PROCEDURE — 80053 COMPREHEN METABOLIC PANEL: CPT

## 2024-08-02 PROCEDURE — 80061 LIPID PANEL: CPT

## 2024-08-02 PROCEDURE — 80053 COMPREHEN METABOLIC PANEL: CPT | Mod: 91

## 2024-08-02 PROCEDURE — 84439 ASSAY OF FREE THYROXINE: CPT

## 2024-08-02 PROCEDURE — 83036 HEMOGLOBIN GLYCOSYLATED A1C: CPT

## 2024-08-02 PROCEDURE — 82306 VITAMIN D 25 HYDROXY: CPT

## 2024-08-02 PROCEDURE — 85025 COMPLETE CBC W/AUTO DIFF WBC: CPT

## 2024-08-02 PROCEDURE — 84443 ASSAY THYROID STIM HORMONE: CPT

## 2024-08-08 SDOH — ECONOMIC STABILITY: FOOD INSECURITY: WITHIN THE PAST 12 MONTHS, YOU WORRIED THAT YOUR FOOD WOULD RUN OUT BEFORE YOU GOT MONEY TO BUY MORE.: NEVER TRUE

## 2024-08-08 SDOH — ECONOMIC STABILITY: FOOD INSECURITY: WITHIN THE PAST 12 MONTHS, THE FOOD YOU BOUGHT JUST DIDN'T LAST AND YOU DIDN'T HAVE MONEY TO GET MORE.: NEVER TRUE

## 2024-08-08 SDOH — HEALTH STABILITY: PHYSICAL HEALTH: ON AVERAGE, HOW MANY DAYS PER WEEK DO YOU ENGAGE IN MODERATE TO STRENUOUS EXERCISE (LIKE A BRISK WALK)?: 6 DAYS

## 2024-08-08 SDOH — ECONOMIC STABILITY: INCOME INSECURITY: IN THE LAST 12 MONTHS, WAS THERE A TIME WHEN YOU WERE NOT ABLE TO PAY THE MORTGAGE OR RENT ON TIME?: NO

## 2024-08-08 SDOH — HEALTH STABILITY: PHYSICAL HEALTH: ON AVERAGE, HOW MANY MINUTES DO YOU ENGAGE IN EXERCISE AT THIS LEVEL?: 60 MIN

## 2024-08-08 SDOH — ECONOMIC STABILITY: INCOME INSECURITY: HOW HARD IS IT FOR YOU TO PAY FOR THE VERY BASICS LIKE FOOD, HOUSING, MEDICAL CARE, AND HEATING?: NOT HARD AT ALL

## 2024-08-08 SDOH — ECONOMIC STABILITY: HOUSING INSECURITY

## 2024-08-08 ASSESSMENT — SOCIAL DETERMINANTS OF HEALTH (SDOH)
IN A TYPICAL WEEK, HOW MANY TIMES DO YOU TALK ON THE PHONE WITH FAMILY, FRIENDS, OR NEIGHBORS?: MORE THAN THREE TIMES A WEEK
HOW OFTEN DO YOU HAVE A DRINK CONTAINING ALCOHOL: 4 OR MORE TIMES A WEEK
HOW HARD IS IT FOR YOU TO PAY FOR THE VERY BASICS LIKE FOOD, HOUSING, MEDICAL CARE, AND HEATING?: NOT HARD AT ALL
HOW OFTEN DO YOU ATTENT MEETINGS OF THE CLUB OR ORGANIZATION YOU BELONG TO?: MORE THAN 4 TIMES PER YEAR
HOW OFTEN DO YOU ATTEND CHURCH OR RELIGIOUS SERVICES?: MORE THAN 4 TIMES PER YEAR
DO YOU BELONG TO ANY CLUBS OR ORGANIZATIONS SUCH AS CHURCH GROUPS UNIONS, FRATERNAL OR ATHLETIC GROUPS, OR SCHOOL GROUPS?: YES
DO YOU BELONG TO ANY CLUBS OR ORGANIZATIONS SUCH AS CHURCH GROUPS UNIONS, FRATERNAL OR ATHLETIC GROUPS, OR SCHOOL GROUPS?: YES
WITHIN THE PAST 12 MONTHS, YOU WORRIED THAT YOUR FOOD WOULD RUN OUT BEFORE YOU GOT THE MONEY TO BUY MORE: NEVER TRUE
HOW OFTEN DO YOU HAVE SIX OR MORE DRINKS ON ONE OCCASION: NEVER
IN THE PAST 12 MONTHS, HAS THE ELECTRIC, GAS, OIL, OR WATER COMPANY THREATENED TO SHUT OFF SERVICE IN YOUR HOME?: NO
HOW OFTEN DO YOU ATTEND CHURCH OR RELIGIOUS SERVICES?: MORE THAN 4 TIMES PER YEAR
HOW MANY DRINKS CONTAINING ALCOHOL DO YOU HAVE ON A TYPICAL DAY WHEN YOU ARE DRINKING: 1 OR 2
HOW OFTEN DO YOU GET TOGETHER WITH FRIENDS OR RELATIVES?: MORE THAN THREE TIMES A WEEK
HOW OFTEN DO YOU ATTENT MEETINGS OF THE CLUB OR ORGANIZATION YOU BELONG TO?: MORE THAN 4 TIMES PER YEAR
IN A TYPICAL WEEK, HOW MANY TIMES DO YOU TALK ON THE PHONE WITH FAMILY, FRIENDS, OR NEIGHBORS?: MORE THAN THREE TIMES A WEEK
HOW OFTEN DO YOU GET TOGETHER WITH FRIENDS OR RELATIVES?: MORE THAN THREE TIMES A WEEK

## 2024-08-08 ASSESSMENT — LIFESTYLE VARIABLES
HOW OFTEN DO YOU HAVE A DRINK CONTAINING ALCOHOL: 4 OR MORE TIMES A WEEK
AUDIT-C TOTAL SCORE: 4
HOW OFTEN DO YOU HAVE SIX OR MORE DRINKS ON ONE OCCASION: NEVER
HOW MANY STANDARD DRINKS CONTAINING ALCOHOL DO YOU HAVE ON A TYPICAL DAY: 1 OR 2
SKIP TO QUESTIONS 9-10: 1

## 2024-08-09 ENCOUNTER — APPOINTMENT (OUTPATIENT)
Dept: MEDICAL GROUP | Facility: LAB | Age: 64
End: 2024-08-09
Payer: COMMERCIAL

## 2024-08-09 VITALS
SYSTOLIC BLOOD PRESSURE: 102 MMHG | RESPIRATION RATE: 16 BRPM | OXYGEN SATURATION: 98 % | HEIGHT: 66 IN | HEART RATE: 72 BPM | BODY MASS INDEX: 22.53 KG/M2 | WEIGHT: 140.21 LBS | TEMPERATURE: 97.2 F | DIASTOLIC BLOOD PRESSURE: 60 MMHG

## 2024-08-09 DIAGNOSIS — Z00.00 ANNUAL PHYSICAL EXAM: ICD-10-CM

## 2024-08-09 DIAGNOSIS — F51.01 PRIMARY INSOMNIA: ICD-10-CM

## 2024-08-09 PROCEDURE — 99397 PER PM REEVAL EST PAT 65+ YR: CPT | Performed by: STUDENT IN AN ORGANIZED HEALTH CARE EDUCATION/TRAINING PROGRAM

## 2024-08-09 PROCEDURE — 3074F SYST BP LT 130 MM HG: CPT | Performed by: STUDENT IN AN ORGANIZED HEALTH CARE EDUCATION/TRAINING PROGRAM

## 2024-08-09 PROCEDURE — 3078F DIAST BP <80 MM HG: CPT | Performed by: STUDENT IN AN ORGANIZED HEALTH CARE EDUCATION/TRAINING PROGRAM

## 2024-08-09 ASSESSMENT — ENCOUNTER SYMPTOMS
FEVER: 0
GENERAL WELL-BEING: EXCELLENT
CHILLS: 0
SHORTNESS OF BREATH: 0

## 2024-08-09 ASSESSMENT — ACTIVITIES OF DAILY LIVING (ADL): BATHING_REQUIRES_ASSISTANCE: 0

## 2024-08-09 ASSESSMENT — PATIENT HEALTH QUESTIONNAIRE - PHQ9: CLINICAL INTERPRETATION OF PHQ2 SCORE: 0

## 2024-08-09 ASSESSMENT — FIBROSIS 4 INDEX: FIB4 SCORE: 1.88

## 2024-08-09 NOTE — PROGRESS NOTES
Subjective:     CC:   Chief Complaint   Patient presents with    Annual Wellness Visit       HPI:   Lily Sawant is a 64 y.o. female who presents for annual exam    Patient has GYN provider: Yes  Last Pap Smear:   H/O Abnormal Pap: No  Last Mammogram:   Last Bone Density Test: , scheduled for next week  Last Colorectal Cancer Screenin  Last Tdap:   Received HPV series: Aged out     Exercise: everyday exercise   Diet: gluten free diet     OB History    Para Term  AB Living   5 3 2 1 2 3   SAB IAB Ectopic Molar Multiple Live Births   2 0 0 0 1 4      She  reports being sexually active and has had partner(s) who are male. She reports using the following method of birth control/protection: None.    She  has a past medical history of Celiac disease (2015), Family history of ischemic heart disease (IHD), FH: renal cell carcinoma (2015), High cholesterol, Hyperlipidemia (2015), Hypertension, Migraine headache (2015), and Thyroid disease.    She has no past medical history of History of melanoma or Personal history of renal cancer.  She  has a past surgical history that includes tonsillectomy; dilation and evacuation ( & ); hysteroscopy with myosure (N/A, 2023); and dilation and curettage (N/A, 2023).    Family History   Problem Relation Age of Onset    Arthritis Mother     Glasses Mother     Other Mother     Heart Disease Mother     Cancer Sister         Melanoma    Other Sister     Cancer Father         Renal & Lung    Lung Disease Father     Glasses Father     Cancer Brother         Melanoma - eye    Other Brother     Glaucoma Brother     Glasses Child     Other Child         SIDS    Glasses Child     Glasses Child     Cancer Paternal Grandfather         Renal    Heart Disease Maternal Grandmother     Heart Attack Maternal Uncle 65        MI     Social History     Tobacco Use    Smoking status: Former     Current packs/day: 0.00      Average packs/day: 0.3 packs/day for 3.0 years (0.8 ttl pk-yrs)     Types: Cigarettes     Start date: 1975     Quit date: 1978     Years since quittin.5    Smokeless tobacco: Never   Vaping Use    Vaping status: Never Used   Substance Use Topics    Alcohol use: Yes     Alcohol/week: 3.0 oz     Types: 5 Glasses of wine per week     Comment: weekly    Drug use: No       Patient Active Problem List    Diagnosis Date Noted    Primary insomnia 2024    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    Myopia of both eyes 2022    Multiple thyroid nodules 2021    Dermatochalasis of left upper eyelid 2021    Glaucoma suspect of both eyes 2021    Age-related nuclear cataract of both eyes 2021    PVD (posterior vitreous detachment), bilateral 2019    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 10/23/2017    Vitamin D deficiency 2016    Mixed hyperlipidemia 2016    Family history of ischemic heart disease (IHD)     Atherosclerosis of both carotid arteries, mild per 2016 Ultrasound 2016    Postmenopausal 2016    Renal cyst, left 2016    Migraine with aura and without status migrainosus, not intractable 2015    Celiac disease 2015    FH: renal cell carcinoma 2015    Family history of malignant melanoma 2015     Current Outpatient Medications   Medication Sig Dispense Refill    estradiol (ESTRACE) 0.1 MG/GM vaginal cream Insert 0.5-1g nightly x 1 week then twice weekly thereafter for maintenance therapy 42.5 g 3    atorvastatin (LIPITOR) 20 MG Tab TAKE 1 TABLET BY MOUTH ONCE DAILY AT BEDTIME 90 Tablet 3    Multiple Vitamins-Minerals (WOMENS DAILY FORMULA PO) Take  by mouth.      Cholecalciferol (VITAMIN D) 2000 units Cap Take 2,000 Units by mouth every day.      aspirin EC (ECOTRIN) 81 MG Tablet Delayed Response Take 81 mg by mouth every day.       No current facility-administered medications for this visit.  "    Allergies   Allergen Reactions    Gluten Meal Diarrhea    Pineapple Diarrhea       Review of Systems   Review of Systems   Constitutional:  Negative for chills and fever.   Respiratory:  Negative for shortness of breath.    Cardiovascular:  Negative for chest pain.         Objective:   /60 (BP Location: Right arm, Patient Position: Sitting, BP Cuff Size: Adult)   Pulse 72   Temp 36.2 °C (97.2 °F) (Temporal)   Resp 16   Ht 1.676 m (5' 6\")   Wt 63.6 kg (140 lb 3.4 oz)   LMP 08/25/2008   SpO2 98%   BMI 22.63 kg/m²     Wt Readings from Last 4 Encounters:   08/09/24 63.6 kg (140 lb 3.4 oz)   06/12/24 62.6 kg (138 lb)   09/06/23 64 kg (141 lb)   08/23/23 62.6 kg (138 lb 0.1 oz)         Physical Exam:  Physical Exam  Constitutional:       General: She is not in acute distress.     Appearance: She is not ill-appearing.   Pulmonary:      Effort: Pulmonary effort is normal.   Neurological:      Mental Status: She is alert.   Psychiatric:         Mood and Affect: Mood normal.         Behavior: Behavior normal.         Thought Content: Thought content normal.         Judgment: Judgment normal.           Assessment and Plan:     1. Primary insomnia    2. Annual physical exam  - HEMOGLOBIN A1C; Future  - CBC WITH DIFFERENTIAL; Future  - Comp Metabolic Panel; Future  - TSH WITH REFLEX TO FT4; Future  - Lipid Profile; Future      Health maintenance:    Labs per orders  Immunizations per orders  Age-appropriate guidance discussed including diet and exercise  Discussed  diet and exercise     Follow-up: 1 year annual   "

## 2024-08-09 NOTE — PROGRESS NOTES
Chief Complaint   Patient presents with    Annual Wellness Visit       HPI:  Lily Sawant is a 64 y.o. 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    Myopia of both eyes 04/25/2022    Multiple thyroid nodules 04/30/2021    Dermatochalasis of left upper eyelid 02/08/2021    Glaucoma suspect of both eyes 02/08/2021    Age-related nuclear cataract of both eyes 02/08/2021    PVD (posterior vitreous detachment), bilateral 08/07/2019    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 10/23/2017    Vitamin D deficiency 11/14/2016    Mixed hyperlipidemia 09/27/2016    Family history of ischemic heart disease (IHD)     Atherosclerosis of both carotid arteries, mild per 2016 Ultrasound 09/16/2016    Postmenopausal 05/31/2016    Renal cyst, left 02/16/2016    Migraine with aura and without status migrainosus, not intractable 02/13/2015    Celiac disease 02/13/2015    FH: renal cell carcinoma 02/13/2015    Family history of malignant melanoma 02/13/2015       Current Outpatient Medications   Medication Sig Dispense Refill    estradiol (ESTRACE) 0.1 MG/GM vaginal cream Insert 0.5-1g nightly x 1 week then twice weekly thereafter for maintenance therapy 42.5 g 3    atorvastatin (LIPITOR) 20 MG Tab TAKE 1 TABLET BY MOUTH ONCE DAILY AT BEDTIME 90 Tablet 3    Multiple Vitamins-Minerals (WOMENS DAILY FORMULA PO) Take  by mouth.      Cholecalciferol (VITAMIN D) 2000 units Cap Take 2,000 Units by mouth every day.      aspirin EC (ECOTRIN) 81 MG Tablet Delayed Response Take 81 mg by mouth every day.       No current facility-administered medications for this visit.          Current supplements as per medication list.     Allergies: Gluten meal and Pineapple    Current social contact/activities: ***     She  reports that she quit smoking about 46 years ago. Her smoking use included cigarettes. She started smoking about 49 years  ago. She has a 0.8 pack-year smoking history. She has never used smokeless tobacco. She reports current alcohol use of about 3.0 oz of alcohol per week. She reports that she does not use drugs.  Counseling given: Not Answered      ROS:    Gait: Uses no assistive device  Ostomy: No  Other tubes: No  Amputations: No  Chronic oxygen use: No  Last eye exam: 6/2024  Wears hearing aids: No   : Denies any urinary leakage during the last 6 months    Screening:  ***  Depression Screening  Little interest or pleasure in doing things?  0 - not at all  Feeling down, depressed , or hopeless? 0 - not at all  Patient Health Questionnaire Score: 0     If depressive symptoms identified deferred to follow up visit unless specifically addressed in assessment and plan.    Interpretation of PHQ-9 Total Score   Score Severity   1-4 No Depression   5-9 Mild Depression   10-14 Moderate Depression   15-19 Moderately Severe Depression   20-27 Severe Depression    Screening for Cognitive Impairment  Do you or any of your friends or family members have any concern about your memory?    Three Minute Recall (Leader, Season, Table)  /3    Jacques clock face with all 12 numbers and set the hands to show 10 minutes after 11.       Cognitive concerns identified deferred for follow up unless specifically addressed in assessment and plan.    Fall Risk Assessment  Has the patient had two or more falls in the last year or any fall with injury in the last year?  No    Safety Assessment  Do you always wear your seatbelt?     Any changes to home needed to function safely?    Difficulty hearing.     Patient counseled about all safety risks that were identified.    Functional Assessment ADLs  Are there any barriers preventing you from cooking for yourself or meeting nutritional needs?   .    Are there any barriers preventing you from driving safely or obtaining transportation?   .    Are there any barriers preventing you from using a telephone or calling for  help?       Are there any barriers preventing you from shopping?   .    Are there any barriers preventing you from taking care of your own finances?       Are there any barriers preventing you from managing your medications?       Are there any barriers preventing you from showering, bathing or dressing yourself?      Are there any barriers preventing you from doing housework or laundry?    Are there any barriers preventing you from using the toilet?   Are you currently engaging in any exercise or physical activity?   .      Self-Assessment of Health  What is your perception of your health?      Do you sleep more than six hours a night?      In the past 7 days, how much did pain keep you from doing your normal work?      Do you spend quality time with family or friends (virtually or in person)?      Do you usually eat a heart healthy diet that constists of a variety of fruits, vegetables, whole grains and fiber?      Do you eat foods high in fat and/or Fast Food more than three times per week?      How concerned are you that your medical conditions are not being well managed?      Are you worried that in the next 2 months, you may not have stable housing that you own, rent, or stay in as part of a household?        Advance Care Planning  Do you have an Advance Directive, Living Will, Durable Power of , or POLST?                   Health Maintenance Summary            Overdue - Hepatitis B Vaccine (Hep B) (3 of 3 - 19+ 3-dose series) Overdue since 4/18/2023 02/21/2023  Imm Admin: Hepatitis B Vaccine (Adol/Adult)    10/14/2010  Imm Admin: Hepatitis B Vaccine (Adol/Adult)              Influenza Vaccine (1) Next due on 9/1/2024      10/14/2023  Outside Immunization: Fluzone High-Dose Quad    09/26/2022  Imm Admin: Influenza Vaccine Quad Recombinant    10/19/2021  Imm Admin: Influenza Vaccine Quad Inj (Pf)    10/15/2020  Imm Admin: Influenza Vaccine Quad Inj (Pf)    10/24/2019  Imm Admin: Influenza Vaccine  Quad Inj (Pf)    Only the first 5 history entries have been loaded, but more history exists.              IMM DTaP/Tdap/Td Vaccine (2 - Td or Tdap) Next due on 3/4/2025      03/04/2015  Imm Admin: Tdap Vaccine              Mammogram (Every 2 Years) Next due on 3/4/2026      03/04/2024  MA-SCREENING MAMMO BILAT W/TOMOSYNTHESIS W/CAD    02/17/2023  MA-SCREENING MAMMO BILAT W/TOMOSYNTHESIS W/CAD    02/15/2022  MA-SCREENING MAMMO BILAT W/TOMOSYNTHESIS W/CAD    02/12/2021  MA-SCREENING MAMMO BILAT W/TOMOSYNTHESIS W/CAD    01/27/2020  MA-SCREENING MAMMO BILAT W/TOMOSYNTHESIS W/CAD    Only the first 5 history entries have been loaded, but more history exists.              Scheduled - Bone Density Scan (Every 5 Years) Scheduled for 8/19/2024 06/16/2022  DS-BONE DENSITY STUDY (DEXA)    04/28/2021  DS-BONE DENSITY STUDY (DEXA)    04/04/2016  DS-BONE DENSITY STUDY (DEXA)              Colorectal Cancer Screening (Colonoscopy - Every 10 Years) Tentatively due on 8/3/2030      08/03/2020  REFERRAL TO GI FOR COLONOSCOPY    07/16/2010  AMB REFERRAL TO GI FOR COLONOSCOPY              Zoster (Shingles) Vaccines (Series Information) Completed      04/05/2019  Imm Admin: Zoster Vaccine Recombinant (RZV) (SHINGRIX)    01/22/2019  Imm Admin: Zoster Vaccine Recombinant (RZV) (SHINGRIX)    10/21/2016  Imm Admin: Zoster Vaccine Live (ZVL) (Zostavax) - HISTORICAL DATA              Hepatitis C Screening  Tentatively Complete      01/28/2020  Hepatitis C Antibody component of HEP C VIRUS ANTIBODY              Pneumococcal Vaccine: 65+ Years (Series Information) Completed      08/10/2023  Imm Admin: Pneumococcal Conjugate Vaccine (PCV20)              COVID-19 Vaccine (Series Information) Completed      10/03/2023  Imm Admin: Covid-19 Mrna (Spikevax) Moderna 12+ Years    12/12/2022  Imm Admin: MODERNA BIVALENT BOOSTER SARS-COV-2 VACCINE (6+)    08/24/2021  Outside Immunization: COVID-19 mRNA (PFR)    02/05/2021  Imm Admin: PFIZER PURPLE CAP  SARS-COV-2 VACCINATION (12+)    01/15/2021  Imm Admin: PFIZER PURPLE CAP SARS-COV-2 VACCINATION (12+)    Only the first 5 history entries have been loaded, but more history exists.              Hepatitis A Vaccine (Hep A) (Series Information) Aged Out      No completion history exists for this topic.              HPV Vaccines (Series Information) Aged Out      No completion history exists for this topic.              Polio Vaccine (Inactivated Polio) (Series Information) Aged Out      No completion history exists for this topic.              Meningococcal Immunization (Series Information) Aged Out      No completion history exists for this topic.              Discontinued - Cervical Cancer Screening  Discontinued        Frequency changed to Never automatically (Topic No Longer Applies)    2024  THINPREP PAP WITH HPV    2021  Pathology Gynecology Specimen    2021  THINPREP PAP, REFLEX HPV ON ASC-US AND ABOVE    10/23/2017  PATHOLOGY GYN SPECIMEN    Only the first 5 history entries have been loaded, but more history exists.                    Patient Care Team:  Felix Campos D.O. as PCP - General (Internal Medicine)        Social History     Tobacco Use    Smoking status: Former     Current packs/day: 0.00     Average packs/day: 0.3 packs/day for 3.0 years (0.8 ttl pk-yrs)     Types: Cigarettes     Start date: 1975     Quit date: 1978     Years since quittin.5    Smokeless tobacco: Never   Vaping Use    Vaping status: Never Used   Substance Use Topics    Alcohol use: Yes     Alcohol/week: 3.0 oz     Types: 5 Glasses of wine per week     Comment: weekly    Drug use: No     Family History   Problem Relation Age of Onset    Arthritis Mother     Glasses Mother     Other Mother     Heart Disease Mother     Cancer Sister         Melanoma    Other Sister     Cancer Father         Renal & Lung    Lung Disease Father     Glasses Father     Cancer Brother         Melanoma - eye    Other  Brother     Glaucoma Brother     Glasses Child     Other Child         SIDS    Glasses Child     Glasses Child     Cancer Paternal Grandfather         Renal    Heart Disease Maternal Grandmother     Heart Attack Maternal Uncle 65        MI     She  has a past medical history of Celiac disease (02/13/2015), Family history of ischemic heart disease (IHD), FH: renal cell carcinoma (02/13/2015), High cholesterol, Hyperlipidemia (02/13/2015), Hypertension, Migraine headache (02/13/2015), and Thyroid disease.    She has no past medical history of History of melanoma or Personal history of renal cancer.   Past Surgical History:   Procedure Laterality Date    HYSTEROSCOPY WITH MYOSURE N/A 8/23/2023    Procedure: HYSTEROSCOPY DILATION AND CURETTAGE, POLYPECTOMY;  Surgeon: Vivian De La Paz D.O.;  Location: SURGERY SAME DAY [facility];  Service: Obstetrics    DILATION AND CURETTAGE N/A 8/23/2023    Procedure: DILATION AND CURETTAGE;  Surgeon: Vivian De La Paz D.O.;  Location: SURGERY SAME DAY [facility];  Service: Obstetrics    DILATION AND EVACUATION  1985 & 1987    TONSILLECTOMY         Exam:   There were no vitals taken for this visit. There is no height or weight on file to calculate BMI.    Hearing {GOOD/FAIR/POOR/EXCELLENT:22619}.    Dentition {DENTITION:72184}  Alert, oriented in no acute distress.  Eye contact is good, speech goal directed, affect calm    Assessment and Plan. The following treatment and monitoring plan is recommended:  ***  There are no diagnoses linked to this encounter.    Services suggested: { AWV COORDINATION OF SERVICES:20405}  Health Care Screening: Age-appropriate preventive services recommended by USPTF and ACIP covered by Medicare were discussed today. Services ordered if indicated and agreed upon by the patient.  Referrals offered: Community-based lifestyle interventions to reduce health risks and promote self-management and wellness, fall prevention, nutrition, physical activity, tobacco-use  cessation, weight loss, and mental health services as per orders if indicated.    Discussion today about general wellness and lifestyle habits:    Prevent falls and reduce trip hazards; Cautioned about securing or removing rugs.  Have a working fire alarm and carbon monoxide detector;   Engage in regular physical activity and social activities     Follow-up: No follow-ups on file.

## 2024-08-19 ENCOUNTER — HOSPITAL ENCOUNTER (OUTPATIENT)
Dept: RADIOLOGY | Facility: MEDICAL CENTER | Age: 64
End: 2024-08-19
Attending: INTERNAL MEDICINE
Payer: COMMERCIAL

## 2024-08-19 DIAGNOSIS — M85.80 OSTEOPENIA, UNSPECIFIED LOCATION: ICD-10-CM

## 2024-08-19 PROCEDURE — 77080 DXA BONE DENSITY AXIAL: CPT

## 2024-08-27 ENCOUNTER — OFFICE VISIT (OUTPATIENT)
Dept: ENDOCRINOLOGY | Facility: MEDICAL CENTER | Age: 64
End: 2024-08-27
Attending: INTERNAL MEDICINE
Payer: COMMERCIAL

## 2024-08-27 VITALS
DIASTOLIC BLOOD PRESSURE: 64 MMHG | WEIGHT: 140 LBS | BODY MASS INDEX: 22.5 KG/M2 | HEIGHT: 66 IN | HEART RATE: 78 BPM | SYSTOLIC BLOOD PRESSURE: 114 MMHG | OXYGEN SATURATION: 98 %

## 2024-08-27 DIAGNOSIS — M85.80 OSTEOPENIA, UNSPECIFIED LOCATION: ICD-10-CM

## 2024-08-27 DIAGNOSIS — E55.9 VITAMIN D DEFICIENCY: ICD-10-CM

## 2024-08-27 DIAGNOSIS — E04.2 MULTIPLE THYROID NODULES: ICD-10-CM

## 2024-08-27 PROCEDURE — 3078F DIAST BP <80 MM HG: CPT | Performed by: INTERNAL MEDICINE

## 2024-08-27 PROCEDURE — 99211 OFF/OP EST MAY X REQ PHY/QHP: CPT | Performed by: INTERNAL MEDICINE

## 2024-08-27 PROCEDURE — 99214 OFFICE O/P EST MOD 30 MIN: CPT | Performed by: INTERNAL MEDICINE

## 2024-08-27 PROCEDURE — 3074F SYST BP LT 130 MM HG: CPT | Performed by: INTERNAL MEDICINE

## 2024-08-27 RX ORDER — RISEDRONATE SODIUM 35 MG/1
35 TABLET, FILM COATED ORAL
Qty: 4 TABLET | Refills: 11 | Status: ON HOLD | OUTPATIENT
Start: 2024-08-27

## 2024-08-27 ASSESSMENT — FIBROSIS 4 INDEX: FIB4 SCORE: 1.88

## 2024-08-28 ENCOUNTER — APPOINTMENT (RX ONLY)
Dept: URBAN - METROPOLITAN AREA CLINIC 35 | Facility: CLINIC | Age: 64
Setting detail: DERMATOLOGY
End: 2024-08-28

## 2024-08-28 DIAGNOSIS — D22 MELANOCYTIC NEVI: ICD-10-CM

## 2024-08-28 DIAGNOSIS — L81.4 OTHER MELANIN HYPERPIGMENTATION: ICD-10-CM

## 2024-08-28 DIAGNOSIS — L82.1 OTHER SEBORRHEIC KERATOSIS: ICD-10-CM

## 2024-08-28 DIAGNOSIS — Z71.89 OTHER SPECIFIED COUNSELING: ICD-10-CM

## 2024-08-28 PROBLEM — D22.72 MELANOCYTIC NEVI OF LEFT LOWER LIMB, INCLUDING HIP: Status: ACTIVE | Noted: 2024-08-28

## 2024-08-28 PROBLEM — D22.5 MELANOCYTIC NEVI OF TRUNK: Status: ACTIVE | Noted: 2024-08-28

## 2024-08-28 PROCEDURE — ? COUNSELING

## 2024-08-28 PROCEDURE — ? OBSERVATION AND MEASURE

## 2024-08-28 PROCEDURE — 99213 OFFICE O/P EST LOW 20 MIN: CPT

## 2024-08-28 ASSESSMENT — LOCATION ZONE DERM
LOCATION ZONE: ARM
LOCATION ZONE: TRUNK
LOCATION ZONE: LEG
LOCATION ZONE: TOE

## 2024-08-28 ASSESSMENT — LOCATION DETAILED DESCRIPTION DERM
LOCATION DETAILED: LEFT LATERAL 2ND TOE
LOCATION DETAILED: EPIGASTRIC SKIN
LOCATION DETAILED: LEFT DISTAL PRETIBIAL REGION
LOCATION DETAILED: LEFT MEDIAL UPPER BACK
LOCATION DETAILED: LEFT DISTAL DORSAL FOREARM
LOCATION DETAILED: RIGHT DISTAL PRETIBIAL REGION
LOCATION DETAILED: RIGHT POSTERIOR SHOULDER
LOCATION DETAILED: LEFT POSTERIOR SHOULDER
LOCATION DETAILED: INFERIOR THORACIC SPINE
LOCATION DETAILED: RIGHT PROXIMAL DORSAL FOREARM

## 2024-08-28 ASSESSMENT — LOCATION SIMPLE DESCRIPTION DERM
LOCATION SIMPLE: LEFT FOREARM
LOCATION SIMPLE: LEFT PRETIBIAL REGION
LOCATION SIMPLE: LEFT SHOULDER
LOCATION SIMPLE: ABDOMEN
LOCATION SIMPLE: LEFT UPPER BACK
LOCATION SIMPLE: LEFT 2ND TOE
LOCATION SIMPLE: RIGHT PRETIBIAL REGION
LOCATION SIMPLE: RIGHT SHOULDER
LOCATION SIMPLE: UPPER BACK
LOCATION SIMPLE: RIGHT FOREARM

## 2024-08-28 NOTE — PROGRESS NOTES
Chief Complaint: Follow up of Thyroid Nodules and osteopenia    HPI:     Lily Sawant is a 64 y.o. female with history of Thyroid Nodules is here for follow up evaluation.     Her comorbid issues include celiac disease, hyperlipidemia, renal cyst and osteopenia with low fracture risk based on FRAX    She was incidentally found to have thyroid nodules on carotid ultrasound done in 2021.      She denies a family history of thyroid cancer and denies radiation exposure.    Formal ultrasound on May 2021 showed a dominant 2 cm isoechoic solid nodule on the right lower lobe which was biopsied and proven benign on June 1, 2021.   She also had a low risk 1.3 cm complex cyst with internal septations on the right upper lobe     Ultrasound on May 27, 2022 showed stability of the previously detected nodules in the right upper lobe and right lower lobe    She then had a follow-up ultrasound in October 2023 which showed suspicious microcalcifications for the right upper lobe complex nodule measuring 1.3 cm  There was stability of the hypoechoic nodule on the right mid lobe measuring 1.16 cm  There was stability of the previously biopsied isoechoic solid nodule measuring 1.6 cm on the right lower lobe    Ultrasound-guided biopsy of the right upper lobe complex nodule on January 2024 came back benign        On follow-up she is doing well  She denies dysphagia hoarseness and dyspnea  She denies interval falls and fractures    She remains biochemically euthyroid  Her lipids are well-controlled while on atorvastatin     Latest Reference Range & Units 08/02/24 06:56   TSH 0.350 - 5.500 uIU/mL  0.380 - 5.330 uIU/mL 2.580  2.700   Free T-4 0.93 - 1.70 ng/dL 1.25      Latest Reference Range & Units 08/02/24 06:56   Cholesterol,Tot 100 - 199 mg/dL 208 (H)   Triglycerides 0 - 149 mg/dL 56   HDL >=40 mg/dL 98   LDL <100 mg/dL 99         She does have osteopenia diagnosed on her bone density on April 28, 2021 with lowest T score of  -2.1 for left hip FRAX scores were not significantly elevated    Follow-up bone density on June 2, 2022 showed stable osteopenia with lowest T score of -0.9 for the left hip.    Follow-up bone density on August 19, 2024 showed osteopenia with lowest T-score of -1.4 for the left hip with significant bone loss when compared to previous bone density.  There was 6.2% decrease in bone mineral density for the left hip.  In addition her FRAX score was significantly elevated and her 10-year risk for any major osteoporotic fracture was 25.8%      She is currently not on antiresorptive agent therapy.  She is taking daily multivitamins.  She has parental history of hip fracture  She denies personal history of fragility fractures  She exercises regularly and lifts weights    Her labs show normal vitamin D and intact PTH levels and calcium levels     Latest Reference Range & Units 08/02/24 06:56   Pth, Intact 14.0 - 72.0 pg/mL 51.5      Latest Reference Range & Units 08/02/24 06:56   25-Hydroxy   Vitamin D 25 30 - 100 ng/mL  30 - 100 ng/mL 60  60      Latest Reference Range & Units 08/02/24 06:56   Calcium 8.5 - 10.5 mg/dL  8.5 - 10.5 mg/dL 9.3  9.3   Correct Calcium 8.5 - 10.5 mg/dL  8.5 - 10.5 mg/dL 9.0  9.0         Patient's medications, allergies, and social histories were reviewed and updated as appropriate.      ROS:      CONS:     No fever, no chills   EYES:     No diplopia, no blurry vision   CV:           No chest pain, no palpitations   PULM:     No SOB, no cough, no hemoptysis.   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  2023-08: Postmenopausal bleeding  2023-08: Endometrial polyp  2023-06: Vaginal bleeding  2022-04: Myopia of both eyes  2021-04: Multiple thyroid nodules  2021-02: Dermatochalasis of  left upper eyelid  2021: Glaucoma suspect of both eyes  2021: Age-related nuclear cataract of both eyes  2019: PVD (posterior vitreous detachment), bilateral  2019: Acute conjunctivitis of left eye  2017-10: 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  2016: Elevated Lp(a)  2016: Vitamin D deficiency  2016: Mixed hyperlipidemia  2016: Atherosclerosis of both carotid arteries, mild per 2016   Ultrasound  2016: Postmenopausal  2016: Renal cyst, left  2016: Right foot pain  2016: Onychomycosis of left great toe  2015: Right shoulder pain  2015: Migraine with aura and without status migrainosus, not   intractable  2015: Depression  2015: Celiac disease  2015: FH: renal cell carcinoma  2015: Family history of malignant melanoma  2015: Hyperlipidemia  Family history of ischemic heart disease (IHD)      Past Surgical History:  Past Surgical History:   Procedure Laterality Date    HYSTEROSCOPY WITH MYOSURE N/A 2023    Procedure: HYSTEROSCOPY DILATION AND CURETTAGE, POLYPECTOMY;  Surgeon: Vivian De La Paz D.O.;  Location: SURGERY SAME DAY [facility];  Service: Obstetrics    DILATION AND CURETTAGE N/A 2023    Procedure: DILATION AND CURETTAGE;  Surgeon: Vivian De La Paz D.O.;  Location: SURGERY SAME DAY [facility];  Service: Obstetrics    DILATION AND EVACUATION   &     TONSILLECTOMY          Allergies:  Gluten meal and Pineapple     Social History:  Social History     Tobacco Use    Smoking status: Former     Current packs/day: 0.00     Average packs/day: 0.3 packs/day for 3.0 years (0.8 ttl pk-yrs)     Types: Cigarettes     Start date: 1975     Quit date: 1978     Years since quittin.5    Smokeless tobacco: Never   Vaping Use    Vaping status: Never Used   Substance Use Topics    Alcohol use: Yes     Alcohol/week: 3.0 oz     Types: 5 Glasses of wine per week     Comment: weekly    Drug use: No        Family History:   family  "history includes Arthritis in her mother; Cancer in her brother, father, paternal grandfather, and sister; Glasses in her child, child, child, father, and mother; Glaucoma in her brother; Heart Attack (age of onset: 65) in her maternal uncle; Heart Disease in her maternal grandmother and mother; Lung Disease in her father; Other in her brother, child, mother, and sister.      PHYSICAL EXAM:   Vital signs: /64 (BP Location: Right arm, Patient Position: Sitting, BP Cuff Size: Adult long)   Pulse 78   Ht 1.676 m (5' 6\")   Wt 63.5 kg (140 lb)   LMP 08/25/2008   SpO2 98%   BMI 22.60 kg/m²   GENERAL: Well-developed, well-nourished in no apparent distress.   EYE:  No ocular asymmetry, PERRLA  HENT: Pink, moist mucous membranes.    NECK: No thyromegaly.   CARDIOVASCULAR:  No murmurs  LUNGS: Clear breath sounds  ABDOMEN: Soft, nontender   EXTREMITIES: No clubbing, cyanosis, or edema.   NEUROLOGICAL: No gross focal motor abnormalities   LYMPH: No cervical adenopathy palpated.   SKIN: No rashes, lesions.       Labs:  Lab Results   Component Value Date/Time    WBC 5.1 08/02/2024 06:56 AM    RBC 4.30 08/02/2024 06:56 AM    HEMOGLOBIN 13.4 08/02/2024 06:56 AM    MCV 97.0 08/02/2024 06:56 AM    MCH 31.2 08/02/2024 06:56 AM    MCHC 32.1 (L) 08/02/2024 06:56 AM    RDW 43.9 08/02/2024 06:56 AM    MPV 10.3 08/02/2024 06:56 AM       Lab Results   Component Value Date/Time    SODIUM 136 08/02/2024 06:56 AM    SODIUM 136 08/02/2024 06:56 AM    POTASSIUM 4.1 08/02/2024 06:56 AM    POTASSIUM 4.1 08/02/2024 06:56 AM    CHLORIDE 101 08/02/2024 06:56 AM    CHLORIDE 101 08/02/2024 06:56 AM    CO2 24 08/02/2024 06:56 AM    CO2 24 08/02/2024 06:56 AM    ANION 11.0 08/02/2024 06:56 AM    ANION 11.0 08/02/2024 06:56 AM    GLUCOSE 85 08/02/2024 06:56 AM    GLUCOSE 86 08/02/2024 06:56 AM    BUN 16 08/02/2024 06:56 AM    BUN 16 08/02/2024 06:56 AM    CREATININE 0.83 08/02/2024 06:56 AM    CREATININE 0.85 08/02/2024 06:56 AM    CALCIUM 9.3 " 2024 06:56 AM    CALCIUM 9.3 2024 06:56 AM    ASTSGOT 25 2024 06:56 AM    ASTSGOT 25 2024 06:56 AM    ALTSGPT 15 2024 06:56 AM    ALTSGPT 14 2024 06:56 AM    TBILIRUBIN 0.5 2024 06:56 AM    TBILIRUBIN 0.5 2024 06:56 AM    ALBUMIN 4.4 2024 06:56 AM    ALBUMIN 4.4 2024 06:56 AM    ALBUMIN 4.82 2022 09:53 AM    TOTPROTEIN 7.0 2024 06:56 AM    TOTPROTEIN 7.0 2024 06:56 AM    TOTPROTEIN 7.8 2022 09:53 AM    GLOBULIN 2.6 2024 06:56 AM    GLOBULIN 2.6 2024 06:56 AM    AGRATIO 1.7 2024 06:56 AM    AGRATIO 1.7 2024 06:56 AM       Lab Results   Component Value Date/Time    TSHULTRASEN 2.420 2021 0800     Lab Results   Component Value Date/Time    FREET4 1.27 2021 0800     No results found for: FREET3  No results found for: THYSTIMIG      Imagin2022 8:26 AM     HISTORY/REASON FOR EXAM:  Follow-up for thyroid nodules        TECHNIQUE/EXAM DESCRIPTION:  Ultrasound of the soft tissues of the head and neck.     COMPARISON:  Previous ultrasound on May 2021     FINDINGS:  The thyroid gland is homogeneous.  Vascularity is normal.     The right lobe of the thyroid gland measures 4.89 cm x 1.28 cm x 1.40 cm cm.  The left lobe of the thyroid gland measures 4.18 cm x 1.13 cm x 1.28 cm cm.  The isthmus measures 0.17 cm cm.     Nodules >= 1cm:        Nodule #1  There is a(n) hypoechoic wider than tall mixed nodule with smooth margins and no echogenic foci measuring 1.24 x 0.81 x 0.95 cm located on the right upper lobe.     Nodule #2  There is a(n) isoechoic wider than tall solid nodule with smooth margins and no echogenic foci measuring 1.44 x 0.78 x 1.09 cm located on the right lower lobe.     There are no suspicious lateral neck node seen     IMPRESSION:     Homogenous thyroid gland with dominant:  Stable Low suspicion complex nodule located on the right upper lobe  Stable, previously biopsied Low suspicion  solid isoechoic nodule on the right lower lobe     ANALIA Recommendations  Observation - repeat US in 6 to 12 months in the office.  Schedule for biopsy in the office:  Low suspicion nodule if measuring >/= to 1.5 cm           US report completed and dictated by  REHAN Frederick MD, ECNU        10/9/2023 7:46 AM     HISTORY/REASON FOR EXAM:  Follow-up for thyroid nodules        TECHNIQUE/EXAM DESCRIPTION:  Ultrasound of the soft tissues of the head and neck.     COMPARISON:  Previous ultrasound in 2022     FINDINGS:  The thyroid gland is homogeneous.  Vascularity is normal.     The right lobe of the thyroid gland measures 5.42 cm x 1.23 cm x 1.46 cm cm.  The left lobe of the thyroid gland measures 4.64 cm x 0.73 cm x 0.97 cm cm.  The isthmus measures 0.12 cm cm.     Nodules >= 1cm:        Nodule #1  There is a(n) hypoechoic wider than tall mixed nodule with irregular margins and microcalcification measuring 1.31 x 0.93 x 1.10 cm located on the right upper lobe.     Nodule #2  There is a(n) hypoechoic wider than tall cystic nodule with smooth margins and no echogenic foci measuring 1.16 x 0.64 x 0.69 cm located on the right mid lobe.     Nodule #3  There is a(n) isoechoic wider than tall solid nodule with smooth margins and no echogenic foci measuring 1.61 x 0.89 x 1.31 cm located on the right lower lobe.              IMPRESSION:     Homogenous thyroid gland with dominant:  Low suspicion hypoechoic 1.3 cm complex nodule with suspicious echogenic foci seen on right upper lobe  Benign cyst on right lateral mid lobe measuring 1.0 cm  Stable low suspicion previously biopsied isoechoic solid nodule on right lower lobe measuring 1.6 cm     ANALIA Recommendations  Schedule for biopsy of right upper lobe complex nodule       US report completed and dictated by  REHAN Frederick MD, ECNU              8/19/2024 9:58 AM     HISTORY/REASON FOR EXAM:  Postmenopausal, adult bone fracture, history of parent with hip  fracture.     TECHNIQUE/EXAM DESCRIPTION AND NUMBER OF VIEWS:  Dual x-ray bone densitometry was performed from the L1 through L4 levels and from the proximal left femur utilizing the GE Prodigy unit.     COMPARISON: Bone densitometry scan 6/16/2022.     FINDINGS:  The lumbar spine has a mean bone mineral density of 1.102 g/cm2, with a T score of -0.6 and a Z score of 1.0.     The proximal left femur has a mean bone mineral density of 0.835 g/cm2, with a T score of -1.4 and a Z score of -0.1.     When compared with the most recent study dated 6/16/2022, there has been a 1.8% decrease in the bone mineral density of the lumbar spine and a 6.2% decrease in the bone mineral density of the proximal left femur.     IMPRESSION:     1.  According to the World Health Organization classification, bone mineral density of this patient is osteopenic in the left proximal femur and normal in the lumbar spine.     2.  Interval decrease in left proximal femur bone density. No significant change in lumbar spine bone density     10-year Probability of Fracture:  Major Osteoporotic     25.8%  Hip     2.4%  Population      [place] ()     Based on left femur neck BMD     INTERPRETING LOCATION: [address] [place] [address], [place], [address]        ASSESSMENT/PLAN:     1. Osteopenia, unspecified location  Unstable she has bone loss on her left hip with elevation of fracture risk course and her fracture risk is intermediate  As such I recommend initiation of antiresorptive therapy to prevent further bone loss  We reviewed the overview of antiresorptive therapy in the potential choices such as oral and IV bisphosphonates and the respective side effects reviewed the risk of osteonecrosis of the jaw which is a rare complication  We discussed the importance of adequate calcium and vitamin D supplementation  I will start her on risedronate 35 mg weekly.  She will notify me if she has side effects from this medication so we can replace with other  oral bisphosphonates or IV bisphosphonates if medically necessary  Her bone density should be repeated on August 19, 2025    2. Multiple thyroid nodules  Stable  She has previously biopsied complex nodule on the right upper lobe 1.3 cm  She has a stable cyst on the right mid lobe measuring 1.16 cm  She has a stable previously biopsied solid nodule measuring 1.6 cm on the right lower lobe  Recommend observation and repeat ultrasound next year    3. Vitamin D deficiency  Stable   Vitamin D labs were reviewed with patient  Continue current supplements  Continue monitoring levels       Return in about 6 months (around 2/27/2025).      Thank you kindly for allowing me to participate in the thyroid care plan for this patient.    Bernabe Mann MD, FACE, ECNU    CC:   Felix Campos D.O.

## 2024-08-28 NOTE — PROCEDURE: COUNSELING
Patient calls to office to request refill on valacyclovir to [facility], [place]   Detail Level: Generalized

## 2024-08-30 ENCOUNTER — ANESTHESIA EVENT (OUTPATIENT)
Dept: RADIOLOGY | Facility: MEDICAL CENTER | Age: 64
End: 2024-08-30
Payer: COMMERCIAL

## 2024-08-30 ENCOUNTER — APPOINTMENT (OUTPATIENT)
Dept: RADIOLOGY | Facility: MEDICAL CENTER | Age: 64
DRG: 022 | End: 2024-08-30
Payer: COMMERCIAL

## 2024-08-30 ENCOUNTER — APPOINTMENT (OUTPATIENT)
Dept: RADIOLOGY | Facility: MEDICAL CENTER | Age: 64
DRG: 022 | End: 2024-08-30
Attending: INTERNAL MEDICINE
Payer: COMMERCIAL

## 2024-08-30 ENCOUNTER — ANESTHESIA (OUTPATIENT)
Dept: RADIOLOGY | Facility: MEDICAL CENTER | Age: 64
End: 2024-08-30
Payer: COMMERCIAL

## 2024-08-30 ENCOUNTER — HOSPITAL ENCOUNTER (INPATIENT)
Facility: MEDICAL CENTER | Age: 64
End: 2024-08-30
Attending: EMERGENCY MEDICINE | Admitting: INTERNAL MEDICINE
Payer: COMMERCIAL

## 2024-08-30 ENCOUNTER — APPOINTMENT (OUTPATIENT)
Dept: RADIOLOGY | Facility: MEDICAL CENTER | Age: 64
DRG: 022 | End: 2024-08-30
Attending: EMERGENCY MEDICINE
Payer: COMMERCIAL

## 2024-08-30 DIAGNOSIS — I60.9 SAH (SUBARACHNOID HEMORRHAGE) (HCC): ICD-10-CM

## 2024-08-30 LAB
ALBUMIN SERPL BCP-MCNC: 4.1 G/DL (ref 3.2–4.9)
ALBUMIN/GLOB SERPL: 1.7 G/DL
ALP SERPL-CCNC: 57 U/L (ref 30–99)
ALT SERPL-CCNC: 12 U/L (ref 2–50)
ANION GAP SERPL CALC-SCNC: 13 MMOL/L (ref 7–16)
APTT PPP: 26.9 SEC (ref 24.7–36)
AST SERPL-CCNC: 19 U/L (ref 12–45)
BASOPHILS # BLD AUTO: 0.4 % (ref 0–1.8)
BASOPHILS # BLD: 0.02 K/UL (ref 0–0.12)
BILIRUB SERPL-MCNC: 0.5 MG/DL (ref 0.1–1.5)
BUN SERPL-MCNC: 7 MG/DL (ref 8–22)
CALCIUM ALBUM COR SERPL-MCNC: 8.6 MG/DL (ref 8.5–10.5)
CALCIUM SERPL-MCNC: 8.7 MG/DL (ref 8.5–10.5)
CFT BLD TEG: 5 MIN (ref 4.6–9.1)
CFT P HPASE BLD TEG: 5.3 MIN (ref 4.3–8.3)
CHLORIDE SERPL-SCNC: 100 MMOL/L (ref 96–112)
CHOLEST SERPL-MCNC: 187 MG/DL (ref 100–199)
CLOT ANGLE BLD TEG: 75.1 DEGREES (ref 63–78)
CO2 SERPL-SCNC: 21 MMOL/L (ref 20–33)
CREAT SERPL-MCNC: 0.71 MG/DL (ref 0.5–1.4)
CT.EXTRINSIC BLD ROTEM: 1.1 MIN (ref 0.8–2.1)
EKG IMPRESSION: NORMAL
EOSINOPHIL # BLD AUTO: 0.07 K/UL (ref 0–0.51)
EOSINOPHIL NFR BLD: 1.3 % (ref 0–6.9)
ERYTHROCYTE [DISTWIDTH] IN BLOOD BY AUTOMATED COUNT: 41.6 FL (ref 35.9–50)
GFR SERPLBLD CREATININE-BSD FMLA CKD-EPI: 93 ML/MIN/1.73 M 2
GLOBULIN SER CALC-MCNC: 2.4 G/DL (ref 1.9–3.5)
GLUCOSE BLD STRIP.AUTO-MCNC: 118 MG/DL (ref 65–99)
GLUCOSE BLD STRIP.AUTO-MCNC: 166 MG/DL (ref 65–99)
GLUCOSE SERPL-MCNC: 97 MG/DL (ref 65–99)
HCT VFR BLD AUTO: 37 % (ref 37–47)
HDLC SERPL-MCNC: 91 MG/DL
HGB BLD-MCNC: 12.6 G/DL (ref 12–16)
IMM GRANULOCYTES # BLD AUTO: 0.03 K/UL (ref 0–0.11)
IMM GRANULOCYTES NFR BLD AUTO: 0.6 % (ref 0–0.9)
INR PPP: 1.04 (ref 0.87–1.13)
LDLC SERPL CALC-MCNC: 82 MG/DL
LYMPHOCYTES # BLD AUTO: 1.28 K/UL (ref 1–4.8)
LYMPHOCYTES NFR BLD: 23.9 % (ref 22–41)
MCF BLD TEG: 60.2 MM (ref 52–69)
MCF.PLATELET INHIB BLD ROTEM: 20.6 MM (ref 15–32)
MCH RBC QN AUTO: 32 PG (ref 27–33)
MCHC RBC AUTO-ENTMCNC: 34.1 G/DL (ref 32.2–35.5)
MCV RBC AUTO: 93.9 FL (ref 81.4–97.8)
MONOCYTES # BLD AUTO: 0.42 K/UL (ref 0–0.85)
MONOCYTES NFR BLD AUTO: 7.8 % (ref 0–13.4)
NEUTROPHILS # BLD AUTO: 3.54 K/UL (ref 1.82–7.42)
NEUTROPHILS NFR BLD: 66 % (ref 44–72)
NRBC # BLD AUTO: 0 K/UL
NRBC BLD-RTO: 0 /100 WBC (ref 0–0.2)
PA AA BLD-ACNC: 3.9 % (ref 0–11)
PA ADP BLD-ACNC: 6.6 % (ref 0–17)
PLATELET # BLD AUTO: 176 K/UL (ref 164–446)
PMV BLD AUTO: 10.4 FL (ref 9–12.9)
POTASSIUM SERPL-SCNC: 3.5 MMOL/L (ref 3.6–5.5)
PROT SERPL-MCNC: 6.5 G/DL (ref 6–8.2)
PROTHROMBIN TIME: 13.7 SEC (ref 12–14.6)
RBC # BLD AUTO: 3.94 M/UL (ref 4.2–5.4)
SODIUM SERPL-SCNC: 134 MMOL/L (ref 135–145)
SODIUM SERPL-SCNC: 137 MMOL/L (ref 135–145)
SODIUM SERPL-SCNC: 138 MMOL/L (ref 135–145)
SODIUM SERPL-SCNC: 139 MMOL/L (ref 135–145)
TEG ALGORITHM TGALG: NORMAL
TRIGL SERPL-MCNC: 72 MG/DL (ref 0–149)
WBC # BLD AUTO: 5.4 K/UL (ref 4.8–10.8)

## 2024-08-30 PROCEDURE — 99223 1ST HOSP IP/OBS HIGH 75: CPT | Performed by: PSYCHIATRY & NEUROLOGY

## 2024-08-30 PROCEDURE — 75894 X-RAYS TRANSCATH THERAPY: CPT

## 2024-08-30 PROCEDURE — 70496 CT ANGIOGRAPHY HEAD: CPT

## 2024-08-30 PROCEDURE — 700105 HCHG RX REV CODE 258: Performed by: INTERNAL MEDICINE

## 2024-08-30 PROCEDURE — 700102 HCHG RX REV CODE 250 W/ 637 OVERRIDE(OP): Performed by: NURSE PRACTITIONER

## 2024-08-30 PROCEDURE — 160035 HCHG PACU - 1ST 60 MINS PHASE I

## 2024-08-30 PROCEDURE — 99291 CRITICAL CARE FIRST HOUR: CPT

## 2024-08-30 PROCEDURE — 36227 PLACE CATH XTRNL CAROTID: CPT

## 2024-08-30 PROCEDURE — A9270 NON-COVERED ITEM OR SERVICE: HCPCS | Performed by: NURSE PRACTITIONER

## 2024-08-30 PROCEDURE — 93005 ELECTROCARDIOGRAM TRACING: CPT | Performed by: INTERNAL MEDICINE

## 2024-08-30 PROCEDURE — 700101 HCHG RX REV CODE 250: Performed by: INTERNAL MEDICINE

## 2024-08-30 PROCEDURE — 80061 LIPID PANEL: CPT

## 2024-08-30 PROCEDURE — 92610 EVALUATE SWALLOWING FUNCTION: CPT

## 2024-08-30 PROCEDURE — 85730 THROMBOPLASTIN TIME PARTIAL: CPT

## 2024-08-30 PROCEDURE — 85610 PROTHROMBIN TIME: CPT

## 2024-08-30 PROCEDURE — 700102 HCHG RX REV CODE 250 W/ 637 OVERRIDE(OP): Performed by: INTERNAL MEDICINE

## 2024-08-30 PROCEDURE — 85347 COAGULATION TIME ACTIVATED: CPT

## 2024-08-30 PROCEDURE — 700105 HCHG RX REV CODE 258: Performed by: ANESTHESIOLOGY

## 2024-08-30 PROCEDURE — 75898 FOLLOW-UP ANGIOGRAPHY: CPT

## 2024-08-30 PROCEDURE — 85576 BLOOD PLATELET AGGREGATION: CPT | Mod: 91

## 2024-08-30 PROCEDURE — 700111 HCHG RX REV CODE 636 W/ 250 OVERRIDE (IP): Mod: JZ | Performed by: INTERNAL MEDICINE

## 2024-08-30 PROCEDURE — 93010 ELECTROCARDIOGRAM REPORT: CPT | Performed by: INTERNAL MEDICINE

## 2024-08-30 PROCEDURE — 99291 CRITICAL CARE FIRST HOUR: CPT | Performed by: INTERNAL MEDICINE

## 2024-08-30 PROCEDURE — A9579 GAD-BASE MR CONTRAST NOS,1ML: HCPCS | Mod: JZ

## 2024-08-30 PROCEDURE — 03VG3DZ RESTRICTION OF INTRACRANIAL ARTERY WITH INTRALUMINAL DEVICE, PERCUTANEOUS APPROACH: ICD-10-PCS | Performed by: RADIOLOGY

## 2024-08-30 PROCEDURE — 36415 COLL VENOUS BLD VENIPUNCTURE: CPT

## 2024-08-30 PROCEDURE — 700111 HCHG RX REV CODE 636 W/ 250 OVERRIDE (IP): Mod: JZ | Performed by: EMERGENCY MEDICINE

## 2024-08-30 PROCEDURE — 96375 TX/PRO/DX INJ NEW DRUG ADDON: CPT

## 2024-08-30 PROCEDURE — 700111 HCHG RX REV CODE 636 W/ 250 OVERRIDE (IP): Mod: JZ | Performed by: NURSE PRACTITIONER

## 2024-08-30 PROCEDURE — 700117 HCHG RX CONTRAST REV CODE 255: Performed by: EMERGENCY MEDICINE

## 2024-08-30 PROCEDURE — 36224 PLACE CATH CAROTD ART: CPT

## 2024-08-30 PROCEDURE — C1760 CLOSURE DEV, VASC: HCPCS

## 2024-08-30 PROCEDURE — 700117 HCHG RX CONTRAST REV CODE 255: Performed by: RADIOLOGY

## 2024-08-30 PROCEDURE — 700111 HCHG RX REV CODE 636 W/ 250 OVERRIDE (IP): Performed by: ANESTHESIOLOGY

## 2024-08-30 PROCEDURE — 80053 COMPREHEN METABOLIC PANEL: CPT

## 2024-08-30 PROCEDURE — 36226 PLACE CATH VERTEBRAL ART: CPT

## 2024-08-30 PROCEDURE — 700101 HCHG RX REV CODE 250: Performed by: ANESTHESIOLOGY

## 2024-08-30 PROCEDURE — 70553 MRI BRAIN STEM W/O & W/DYE: CPT

## 2024-08-30 PROCEDURE — 96374 THER/PROPH/DIAG INJ IV PUSH: CPT

## 2024-08-30 PROCEDURE — 85025 COMPLETE CBC W/AUTO DIFF WBC: CPT

## 2024-08-30 PROCEDURE — 700117 HCHG RX CONTRAST REV CODE 255: Mod: JZ

## 2024-08-30 PROCEDURE — 770022 HCHG ROOM/CARE - ICU (200)

## 2024-08-30 PROCEDURE — B3151ZZ FLUOROSCOPY OF BILATERAL COMMON CAROTID ARTERIES USING LOW OSMOLAR CONTRAST: ICD-10-PCS | Performed by: RADIOLOGY

## 2024-08-30 PROCEDURE — 160002 HCHG RECOVERY MINUTES (STAT)

## 2024-08-30 PROCEDURE — 82962 GLUCOSE BLOOD TEST: CPT

## 2024-08-30 PROCEDURE — 85384 FIBRINOGEN ACTIVITY: CPT | Mod: 91

## 2024-08-30 PROCEDURE — A9270 NON-COVERED ITEM OR SERVICE: HCPCS | Performed by: INTERNAL MEDICINE

## 2024-08-30 PROCEDURE — 84295 ASSAY OF SERUM SODIUM: CPT

## 2024-08-30 RX ORDER — POLYETHYLENE GLYCOL 3350 17 G/17G
1 POWDER, FOR SOLUTION ORAL
Status: DISCONTINUED | OUTPATIENT
Start: 2024-08-30 | End: 2024-09-01 | Stop reason: HOSPADM

## 2024-08-30 RX ORDER — LABETALOL HYDROCHLORIDE 5 MG/ML
5 INJECTION, SOLUTION INTRAVENOUS
Status: DISCONTINUED | OUTPATIENT
Start: 2024-08-30 | End: 2024-08-30 | Stop reason: HOSPADM

## 2024-08-30 RX ORDER — SODIUM CHLORIDE 9 MG/ML
INJECTION, SOLUTION INTRAVENOUS CONTINUOUS
Status: DISCONTINUED | OUTPATIENT
Start: 2024-08-30 | End: 2024-08-31

## 2024-08-30 RX ORDER — ACETAMINOPHEN 325 MG/1
650 TABLET ORAL EVERY 4 HOURS PRN
Status: DISCONTINUED | OUTPATIENT
Start: 2024-08-30 | End: 2024-08-30 | Stop reason: SDUPTHER

## 2024-08-30 RX ORDER — OXYCODONE HCL 5 MG/5 ML
5 SOLUTION, ORAL ORAL
Status: DISCONTINUED | OUTPATIENT
Start: 2024-08-30 | End: 2024-08-30 | Stop reason: HOSPADM

## 2024-08-30 RX ORDER — DEXAMETHASONE SODIUM PHOSPHATE 4 MG/ML
4 INJECTION, SOLUTION INTRA-ARTICULAR; INTRALESIONAL; INTRAMUSCULAR; INTRAVENOUS; SOFT TISSUE EVERY 6 HOURS
Status: DISCONTINUED | OUTPATIENT
Start: 2024-08-30 | End: 2024-09-01

## 2024-08-30 RX ORDER — ACETAMINOPHEN 500 MG
500-1000 TABLET ORAL EVERY 6 HOURS PRN
COMMUNITY

## 2024-08-30 RX ORDER — ACETAMINOPHEN 650 MG/1
650 SUPPOSITORY RECTAL EVERY 4 HOURS PRN
Status: DISCONTINUED | OUTPATIENT
Start: 2024-08-30 | End: 2024-08-30 | Stop reason: SDUPTHER

## 2024-08-30 RX ORDER — HYDROCODONE BITARTRATE AND ACETAMINOPHEN 5; 325 MG/1; MG/1
1 TABLET ORAL EVERY 6 HOURS PRN
Status: DISCONTINUED | OUTPATIENT
Start: 2024-08-30 | End: 2024-08-30

## 2024-08-30 RX ORDER — HALOPERIDOL 5 MG/ML
1 INJECTION INTRAMUSCULAR
Status: DISCONTINUED | OUTPATIENT
Start: 2024-08-30 | End: 2024-08-30 | Stop reason: HOSPADM

## 2024-08-30 RX ORDER — ONDANSETRON 2 MG/ML
4 INJECTION INTRAMUSCULAR; INTRAVENOUS EVERY 4 HOURS PRN
Status: DISCONTINUED | OUTPATIENT
Start: 2024-08-30 | End: 2024-09-01 | Stop reason: HOSPADM

## 2024-08-30 RX ORDER — HEPARIN SODIUM,PORCINE 1000/ML
VIAL (ML) INJECTION PRN
Status: DISCONTINUED | OUTPATIENT
Start: 2024-08-30 | End: 2024-08-30 | Stop reason: SURG

## 2024-08-30 RX ORDER — AMOXICILLIN 250 MG
2 CAPSULE ORAL EVERY EVENING
Status: DISCONTINUED | OUTPATIENT
Start: 2024-08-30 | End: 2024-09-01 | Stop reason: HOSPADM

## 2024-08-30 RX ORDER — HYDROMORPHONE HYDROCHLORIDE 1 MG/ML
0.2 INJECTION, SOLUTION INTRAMUSCULAR; INTRAVENOUS; SUBCUTANEOUS
Status: DISCONTINUED | OUTPATIENT
Start: 2024-08-30 | End: 2024-08-30 | Stop reason: HOSPADM

## 2024-08-30 RX ORDER — HYDROMORPHONE HYDROCHLORIDE 1 MG/ML
0.4 INJECTION, SOLUTION INTRAMUSCULAR; INTRAVENOUS; SUBCUTANEOUS
Status: DISCONTINUED | OUTPATIENT
Start: 2024-08-30 | End: 2024-08-30 | Stop reason: HOSPADM

## 2024-08-30 RX ORDER — ACETAMINOPHEN 500 MG
1000 TABLET ORAL EVERY 6 HOURS PRN
Status: DISCONTINUED | OUTPATIENT
Start: 2024-08-30 | End: 2024-08-30

## 2024-08-30 RX ORDER — OXYCODONE HCL 5 MG/5 ML
10 SOLUTION, ORAL ORAL
Status: DISCONTINUED | OUTPATIENT
Start: 2024-08-30 | End: 2024-08-30 | Stop reason: HOSPADM

## 2024-08-30 RX ORDER — HEPARIN SODIUM 1000 [USP'U]/ML
INJECTION, SOLUTION INTRAVENOUS; SUBCUTANEOUS
Status: COMPLETED
Start: 2024-08-30 | End: 2024-08-30

## 2024-08-30 RX ORDER — PHENYLEPHRINE HYDROCHLORIDE 10 MG/ML
INJECTION, SOLUTION INTRAMUSCULAR; INTRAVENOUS; SUBCUTANEOUS PRN
Status: DISCONTINUED | OUTPATIENT
Start: 2024-08-30 | End: 2024-08-30 | Stop reason: SURG

## 2024-08-30 RX ORDER — HYDROMORPHONE HYDROCHLORIDE 1 MG/ML
0.1 INJECTION, SOLUTION INTRAMUSCULAR; INTRAVENOUS; SUBCUTANEOUS
Status: DISCONTINUED | OUTPATIENT
Start: 2024-08-30 | End: 2024-08-30 | Stop reason: HOSPADM

## 2024-08-30 RX ORDER — DIPHENHYDRAMINE HYDROCHLORIDE 50 MG/ML
25 INJECTION INTRAMUSCULAR; INTRAVENOUS ONCE
Status: COMPLETED | OUTPATIENT
Start: 2024-08-30 | End: 2024-08-30

## 2024-08-30 RX ORDER — METOCLOPRAMIDE HYDROCHLORIDE 5 MG/ML
10 INJECTION INTRAMUSCULAR; INTRAVENOUS ONCE
Status: COMPLETED | OUTPATIENT
Start: 2024-08-30 | End: 2024-08-30

## 2024-08-30 RX ORDER — DEXAMETHASONE SODIUM PHOSPHATE 4 MG/ML
4 INJECTION, SOLUTION INTRA-ARTICULAR; INTRALESIONAL; INTRAMUSCULAR; INTRAVENOUS; SOFT TISSUE ONCE
Status: DISCONTINUED | OUTPATIENT
Start: 2024-08-30 | End: 2024-08-30

## 2024-08-30 RX ORDER — POTASSIUM CHLORIDE 7.45 MG/ML
10 INJECTION INTRAVENOUS
Status: ACTIVE | OUTPATIENT
Start: 2024-08-30 | End: 2024-08-30

## 2024-08-30 RX ORDER — ONDANSETRON 2 MG/ML
INJECTION INTRAMUSCULAR; INTRAVENOUS PRN
Status: DISCONTINUED | OUTPATIENT
Start: 2024-08-30 | End: 2024-08-30 | Stop reason: SURG

## 2024-08-30 RX ORDER — EPHEDRINE SULFATE 50 MG/ML
5 INJECTION, SOLUTION INTRAVENOUS
Status: DISCONTINUED | OUTPATIENT
Start: 2024-08-30 | End: 2024-08-30 | Stop reason: HOSPADM

## 2024-08-30 RX ORDER — CEFAZOLIN SODIUM 1 G/3ML
INJECTION, POWDER, FOR SOLUTION INTRAMUSCULAR; INTRAVENOUS PRN
Status: DISCONTINUED | OUTPATIENT
Start: 2024-08-30 | End: 2024-08-30 | Stop reason: SURG

## 2024-08-30 RX ORDER — ONDANSETRON 4 MG/1
4 TABLET, ORALLY DISINTEGRATING ORAL EVERY 4 HOURS PRN
Status: DISCONTINUED | OUTPATIENT
Start: 2024-08-30 | End: 2024-09-01 | Stop reason: HOSPADM

## 2024-08-30 RX ORDER — HYDRALAZINE HYDROCHLORIDE 20 MG/ML
5 INJECTION INTRAMUSCULAR; INTRAVENOUS
Status: DISCONTINUED | OUTPATIENT
Start: 2024-08-30 | End: 2024-08-30 | Stop reason: HOSPADM

## 2024-08-30 RX ORDER — SODIUM CHLORIDE, SODIUM GLUCONATE, SODIUM ACETATE, POTASSIUM CHLORIDE AND MAGNESIUM CHLORIDE 526; 502; 368; 37; 30 MG/100ML; MG/100ML; MG/100ML; MG/100ML; MG/100ML
INJECTION, SOLUTION INTRAVENOUS
Status: DISCONTINUED | OUTPATIENT
Start: 2024-08-30 | End: 2024-08-30 | Stop reason: SURG

## 2024-08-30 RX ORDER — IBUPROFEN 200 MG
400 TABLET ORAL EVERY 6 HOURS PRN
Status: ON HOLD | COMMUNITY
End: 2024-09-01

## 2024-08-30 RX ORDER — LIDOCAINE HYDROCHLORIDE 20 MG/ML
INJECTION, SOLUTION EPIDURAL; INFILTRATION; INTRACAUDAL; PERINEURAL PRN
Status: DISCONTINUED | OUTPATIENT
Start: 2024-08-30 | End: 2024-08-30 | Stop reason: SURG

## 2024-08-30 RX ORDER — ACETAMINOPHEN 500 MG
1000 TABLET ORAL EVERY 6 HOURS
Status: DISCONTINUED | OUTPATIENT
Start: 2024-08-30 | End: 2024-09-01 | Stop reason: HOSPADM

## 2024-08-30 RX ORDER — ACETAMINOPHEN 325 MG/1
650 TABLET ORAL EVERY 4 HOURS PRN
Status: DISCONTINUED | OUTPATIENT
Start: 2024-08-30 | End: 2024-08-30

## 2024-08-30 RX ORDER — ONDANSETRON 2 MG/ML
4 INJECTION INTRAMUSCULAR; INTRAVENOUS
Status: DISCONTINUED | OUTPATIENT
Start: 2024-08-30 | End: 2024-08-30 | Stop reason: HOSPADM

## 2024-08-30 RX ORDER — LORAZEPAM 2 MG/ML
2 INJECTION INTRAMUSCULAR
Status: DISCONTINUED | OUTPATIENT
Start: 2024-08-30 | End: 2024-09-01 | Stop reason: HOSPADM

## 2024-08-30 RX ORDER — DOXYLAMINE SUCCINATE 25 MG/1
12.5 TABLET ORAL NIGHTLY PRN
COMMUNITY

## 2024-08-30 RX ORDER — ALBUTEROL SULFATE 5 MG/ML
2.5 SOLUTION RESPIRATORY (INHALATION)
Status: DISCONTINUED | OUTPATIENT
Start: 2024-08-30 | End: 2024-08-30 | Stop reason: HOSPADM

## 2024-08-30 RX ORDER — DEXTROSE MONOHYDRATE 25 G/50ML
25 INJECTION, SOLUTION INTRAVENOUS
Status: DISCONTINUED | OUTPATIENT
Start: 2024-08-30 | End: 2024-08-30

## 2024-08-30 RX ORDER — DEXAMETHASONE SODIUM PHOSPHATE 4 MG/ML
INJECTION, SOLUTION INTRA-ARTICULAR; INTRALESIONAL; INTRAMUSCULAR; INTRAVENOUS; SOFT TISSUE PRN
Status: DISCONTINUED | OUTPATIENT
Start: 2024-08-30 | End: 2024-08-30 | Stop reason: SURG

## 2024-08-30 RX ORDER — DEXTROSE MONOHYDRATE 25 G/50ML
25 INJECTION, SOLUTION INTRAVENOUS
Status: DISCONTINUED | OUTPATIENT
Start: 2024-08-30 | End: 2024-09-01 | Stop reason: HOSPADM

## 2024-08-30 RX ORDER — SODIUM CHLORIDE 9 MG/ML
INJECTION, SOLUTION INTRAVENOUS
Status: DISCONTINUED | OUTPATIENT
Start: 2024-08-30 | End: 2024-08-30 | Stop reason: SURG

## 2024-08-30 RX ADMIN — PHENYLEPHRINE HYDROCHLORIDE 200 MCG: 10 INJECTION INTRAVENOUS at 10:09

## 2024-08-30 RX ADMIN — DEXAMETHASONE SODIUM PHOSPHATE 4 MG: 4 INJECTION INTRA-ARTICULAR; INTRALESIONAL; INTRAMUSCULAR; INTRAVENOUS; SOFT TISSUE at 23:52

## 2024-08-30 RX ADMIN — PHENYLEPHRINE HYDROCHLORIDE 100 MCG: 10 INJECTION INTRAVENOUS at 09:51

## 2024-08-30 RX ADMIN — PHENYLEPHRINE HYDROCHLORIDE 100 MCG: 10 INJECTION INTRAVENOUS at 10:20

## 2024-08-30 RX ADMIN — ACETAMINOPHEN 1000 MG: 500 TABLET ORAL at 23:51

## 2024-08-30 RX ADMIN — ACETAMINOPHEN 1000 MG: 500 TABLET ORAL at 17:48

## 2024-08-30 RX ADMIN — LIDOCAINE HYDROCHLORIDE 100 MG: 20 INJECTION, SOLUTION EPIDURAL; INFILTRATION; INTRACAUDAL at 09:30

## 2024-08-30 RX ADMIN — IOHEXOL 80 ML: 350 INJECTION, SOLUTION INTRAVENOUS at 06:20

## 2024-08-30 RX ADMIN — HEPARIN SODIUM 5000 UNITS: 1000 INJECTION, SOLUTION INTRAVENOUS; SUBCUTANEOUS at 09:59

## 2024-08-30 RX ADMIN — ROCURONIUM BROMIDE 50 MG: 10 INJECTION, SOLUTION INTRAVENOUS at 09:30

## 2024-08-30 RX ADMIN — FENTANYL CITRATE 12.5 MCG: 50 INJECTION, SOLUTION INTRAMUSCULAR; INTRAVENOUS at 22:42

## 2024-08-30 RX ADMIN — PROPOFOL 50 MG: 10 INJECTION, EMULSION INTRAVENOUS at 10:25

## 2024-08-30 RX ADMIN — SENNOSIDES AND DOCUSATE SODIUM 2 TABLET: 50; 8.6 TABLET ORAL at 23:52

## 2024-08-30 RX ADMIN — SUGAMMADEX 200 MG: 100 INJECTION, SOLUTION INTRAVENOUS at 11:22

## 2024-08-30 RX ADMIN — PHENYLEPHRINE HYDROCHLORIDE 100 MCG: 10 INJECTION INTRAVENOUS at 10:40

## 2024-08-30 RX ADMIN — METOCLOPRAMIDE 10 MG: 5 INJECTION, SOLUTION INTRAMUSCULAR; INTRAVENOUS at 05:23

## 2024-08-30 RX ADMIN — DIPHENHYDRAMINE HYDROCHLORIDE 25 MG: 50 INJECTION, SOLUTION INTRAMUSCULAR; INTRAVENOUS at 05:25

## 2024-08-30 RX ADMIN — ACETAMINOPHEN 650 MG: 325 TABLET ORAL at 13:56

## 2024-08-30 RX ADMIN — DEXAMETHASONE SODIUM PHOSPHATE 4 MG: 4 INJECTION INTRA-ARTICULAR; INTRALESIONAL; INTRAMUSCULAR; INTRAVENOUS; SOFT TISSUE at 16:10

## 2024-08-30 RX ADMIN — PROPOFOL 200 MG: 10 INJECTION, EMULSION INTRAVENOUS at 09:30

## 2024-08-30 RX ADMIN — CEFAZOLIN 2 G: 1 INJECTION, POWDER, FOR SOLUTION INTRAMUSCULAR; INTRAVENOUS at 09:32

## 2024-08-30 RX ADMIN — SODIUM CHLORIDE 2.5 MG/HR: 9 INJECTION, SOLUTION INTRAVENOUS at 08:28

## 2024-08-30 RX ADMIN — FENTANYL CITRATE 100 MCG: 50 INJECTION, SOLUTION INTRAMUSCULAR; INTRAVENOUS at 09:30

## 2024-08-30 RX ADMIN — SODIUM CHLORIDE, SODIUM GLUCONATE, SODIUM ACETATE, POTASSIUM CHLORIDE AND MAGNESIUM CHLORIDE: 526; 502; 368; 37; 30 INJECTION, SOLUTION INTRAVENOUS at 10:22

## 2024-08-30 RX ADMIN — SODIUM CHLORIDE: 9 INJECTION, SOLUTION INTRAVENOUS at 13:04

## 2024-08-30 RX ADMIN — PHENYLEPHRINE HYDROCHLORIDE 200 MCG: 10 INJECTION INTRAVENOUS at 09:38

## 2024-08-30 RX ADMIN — SODIUM CHLORIDE: 9 INJECTION, SOLUTION INTRAVENOUS at 09:29

## 2024-08-30 RX ADMIN — ONDANSETRON 4 MG: 2 INJECTION INTRAMUSCULAR; INTRAVENOUS at 11:22

## 2024-08-30 RX ADMIN — PHENYLEPHRINE HYDROCHLORIDE 200 MCG: 10 INJECTION INTRAVENOUS at 09:46

## 2024-08-30 RX ADMIN — IOHEXOL 140 ML: 300 INJECTION, SOLUTION INTRAVENOUS at 12:00

## 2024-08-30 RX ADMIN — GADOTERIDOL 13 ML: 279.3 INJECTION, SOLUTION INTRAVENOUS at 23:22

## 2024-08-30 RX ADMIN — DEXAMETHASONE SODIUM PHOSPHATE 6 MG: 4 INJECTION INTRA-ARTICULAR; INTRALESIONAL; INTRAMUSCULAR; INTRAVENOUS; SOFT TISSUE at 09:33

## 2024-08-30 ASSESSMENT — FIBROSIS 4 INDEX
FIB4 SCORE: 1.88
FIB4 SCORE: 2.06

## 2024-08-30 ASSESSMENT — COPD QUESTIONNAIRES
COPD SCREENING SCORE: 4
DURING THE PAST 4 WEEKS HOW MUCH DID YOU FEEL SHORT OF BREATH: NONE/LITTLE OF THE TIME
HAVE YOU SMOKED AT LEAST 100 CIGARETTES IN YOUR ENTIRE LIFE: YES
DO YOU EVER COUGH UP ANY MUCUS OR PHLEGM?: NO/ONLY WITH OCCASIONAL COLDS OR INFECTIONS

## 2024-08-30 ASSESSMENT — COGNITIVE AND FUNCTIONAL STATUS - GENERAL
SUGGESTED CMS G CODE MODIFIER DAILY ACTIVITY: CH
DAILY ACTIVITIY SCORE: 24
SUGGESTED CMS G CODE MODIFIER MOBILITY: CH
MOBILITY SCORE: 24

## 2024-08-30 ASSESSMENT — LIFESTYLE VARIABLES
TOTAL SCORE: 0
HOW MANY TIMES IN THE PAST YEAR HAVE YOU HAD 5 OR MORE DRINKS IN A DAY: 0
TOTAL SCORE: 0
CONSUMPTION TOTAL: POSITIVE
ALCOHOL_USE: YES
EVER HAD A DRINK FIRST THING IN THE MORNING TO STEADY YOUR NERVES TO GET RID OF A HANGOVER: NO
AVERAGE NUMBER OF DAYS PER WEEK YOU HAVE A DRINK CONTAINING ALCOHOL: 7
HAVE YOU EVER FELT YOU SHOULD CUT DOWN ON YOUR DRINKING: NO
EVER FELT BAD OR GUILTY ABOUT YOUR DRINKING: NO
TOTAL SCORE: 0
HAVE PEOPLE ANNOYED YOU BY CRITICIZING YOUR DRINKING: NO
ON A TYPICAL DAY WHEN YOU DRINK ALCOHOL HOW MANY DRINKS DO YOU HAVE: 2

## 2024-08-30 ASSESSMENT — SOCIAL DETERMINANTS OF HEALTH (SDOH)
WITHIN THE LAST YEAR, HAVE YOU BEEN AFRAID OF YOUR PARTNER OR EX-PARTNER?: NO
WITHIN THE PAST 12 MONTHS, THE FOOD YOU BOUGHT JUST DIDN'T LAST AND YOU DIDN'T HAVE MONEY TO GET MORE: NEVER TRUE
WITHIN THE PAST 12 MONTHS, YOU WORRIED THAT YOUR FOOD WOULD RUN OUT BEFORE YOU GOT THE MONEY TO BUY MORE: NEVER TRUE
IN THE PAST 12 MONTHS, HAS THE ELECTRIC, GAS, OIL, OR WATER COMPANY THREATENED TO SHUT OFF SERVICE IN YOUR HOME?: NO
WITHIN THE LAST YEAR, HAVE YOU BEEN KICKED, HIT, SLAPPED, OR OTHERWISE PHYSICALLY HURT BY YOUR PARTNER OR EX-PARTNER?: NO
WITHIN THE LAST YEAR, HAVE YOU BEEN HUMILIATED OR EMOTIONALLY ABUSED IN OTHER WAYS BY YOUR PARTNER OR EX-PARTNER?: NO
WITHIN THE LAST YEAR, HAVE TO BEEN RAPED OR FORCED TO HAVE ANY KIND OF SEXUAL ACTIVITY BY YOUR PARTNER OR EX-PARTNER?: NO

## 2024-08-30 ASSESSMENT — PAIN DESCRIPTION - PAIN TYPE
TYPE: ACUTE PAIN

## 2024-08-30 ASSESSMENT — ENCOUNTER SYMPTOMS
DOUBLE VISION: 0
SHORTNESS OF BREATH: 0
CONSTITUTIONAL NEGATIVE: 1
PHOTOPHOBIA: 1
BLURRED VISION: 0
PSYCHIATRIC NEGATIVE: 1
DIZZINESS: 0
NAUSEA: 0
MUSCULOSKELETAL NEGATIVE: 1
TINGLING: 0
WEAKNESS: 0
VOMITING: 0

## 2024-08-30 ASSESSMENT — PATIENT HEALTH QUESTIONNAIRE - PHQ9
2. FEELING DOWN, DEPRESSED, IRRITABLE, OR HOPELESS: NOT AT ALL
SUM OF ALL RESPONSES TO PHQ9 QUESTIONS 1 AND 2: 0
1. LITTLE INTEREST OR PLEASURE IN DOING THINGS: NOT AT ALL

## 2024-08-30 ASSESSMENT — PAIN SCALES - GENERAL: PAIN_LEVEL: 0

## 2024-08-30 NOTE — PROGRESS NOTES
Pt presents to IR 2. Patient was consented by MD at bedside, confirmed by this RN. Anesthesia consent confirmed and at bedside. Anesthesia care provided by . Pt transferred to IR table in supine position. Patient underwent a Cerebral angiogram with embolization by Dr. Arnold. Procedure site was marked by MD and verified using imaging guidance. Pt placed on monitor, prepped and draped in a sterile fashion. All vital signs monitoring and medication administration by anesthesia services - See anesthesia flowsheet for details. Report called to Fátima VEGA. Pt transported by bed with RN to PACU.       [place] [address]  REF: [id number]  LOT: [id number]  EXP: 03/15/2027   Given @ 1045  Left Occipital artery    [address] [address]  REF: [id number]  LOT: [id number]  EXP: 4/16/2027   Given @ 1051  Left Occipital artery    [address]  REF: [id number]  LOT: [id number]  EXP: 6/5/2027  Given @ 1059  Left Occipital artery      [place]  REF: [id number]  LOT: [id number]  EXP: 03/23/2025

## 2024-08-30 NOTE — ASSESSMENT & PLAN NOTE
Subarachnoid hemorrhage etiology: dural AV fistula  S/P dAVF embolization on 8/30  Strict blood pressure control, goal SBP less than 140 mmHg.  Serial neurologic exams every 2 hrs while awake q4 at night  Neuro IR, neuro and Neurosurgery following  Acute pain management.

## 2024-08-30 NOTE — THERAPY
Speech Language Pathology   Clinical Swallow Evaluation     Patient Name: Lily Sawant  AGE:  64 y.o., SEX:  female  Medical Record #: [id number]  Date of Service: 2024      History of Present Illness  64F admitted on 24 with headache while swimming in [facility]. Patient found to have SAH. PMH notable for migraine, celiac disease, glaucoma, insomnia, thyroid nodules. Patient has a gluten and pineapple allergy, lactose sensativity.    Not previously seen by service per EMR.     Imagin24 CT/A Head:   1.  There is aneurysm-like structure in the right posterior fossa near the midline measuring 5.5 mm, could represent arterial or venous aneurysm.  2.  Multiple tortuous vascular structures in the right posterior fossa compatible with vascular malformation  3.  No large vessel occlusion identified.  4.  Layering subarachnoid hemorrhage along the bilateral tentorium with subarachnoid hemorrhages in the right posterior fossa.    No chest imaging to review.       General Information:  Vitals  O2 Delivery Device: None - Room Air  Level of Consciousness: Alert, Awake  Orientation: Oriented x 4  Follows Directives: Yes      Prior Living Situation & Level of Function:  Housing / Facility: [address]  Lives with - Patient's Self Care Capacity: Spouse  Communication: WFL  Swallowing: Patient denies h/o dysphagia       Oral Mechanism Evaluation:  Dentition: Good, Natural dentition   Facial Symmetry: Equal  Facial Sensation: Equal     Labial Observations: WFL   Lingual Observations: Midline         Laryngeal Function:  Secretion Management: Adequate  Voice Quality: WFL  Cough: Perceptually WNL         Subjective  RN cleared patient for clinical swallow evaluation. Patient received awake, eyes closed, SO at bedside. Patient fully cooperated with SLP tasks.      Assessment  Current Method of Nutrition: NPO until cleared by speech pathology  Positioning: Holland's (60-90 degrees)  Bolus Administration: SLP,  "Patient    O2 Delivery Device: None - Room Air  Factor(s) Affecting Performance: None  Tracheostomy : No            Swallowing Trials:  Swallowing Trials  Ice: WFL  Thin Liquid (TN0): WFL  Soft & Bite Sized (SB6): WFL      Comments: Patient independently completed oral care prior to PO presentation. Patient adequately self-feeding without difficulty. Adequate oral bolus acceptance/containment. Functional mastication of soft/bite sized solids. Swallow initiation appeared timely. No cough appreciated with PO trials. Throat clear x1 with PO of thins by straw, not other throat clear appreciated. Vocal quality remained clear. Single swallow completed per bolus. No signs of esophageal dysfunction. No overt s/sx of aspiration appreciated.        Clinical Impressions  Patient presents with a functional swallow. Diet modification is not indicated. Given acute SAH, service will follow likely x1, to ensure diet tolerance and monitor for changes.        Recommendations  Diet Consistency: Regular solids, thin liquids  Instrumentation: None indicated at this time (will consider diagnostic swallow study with any ongoing concerns)  Medication: As tolerated  Supervision: Independent  Positioning: Fully upright and midline during oral intake, Meals sitting upright in a chair, as tolerated  Risk Management : Slow rate of intake, Physical mobility, as tolerated  Oral Care: BID         SLP Treatment Plan  Treatment Plan: Dysphagia Treatment, Patient/Family/Caregiver Training  SLP Frequency: 2x Per Week  Estimated Duration: Until Therapy Goals Met      Anticipated Discharge Needs  Discharge Recommendations: Other (TBD pending cognitive-linguistic evaluation)   Therapy Recommendations Upon DC: Patient / Family / Caregiver Education        Patient / Family Goals  Patient / Family Goal #1: \"Good\"  Short Term Goals  Short Term Goal # 1: Pt will consume rg/tn diet with no overt s/sx of aspiration or decline in pulmonary status      Aleksandra " Roxann, SLP

## 2024-08-30 NOTE — ED NOTES
Med Rec completed per patient and    Allergies reviewed  No ORAL antibiotics in last 30 days    Patient is not taking anticoagulants

## 2024-08-30 NOTE — ANESTHESIA PROCEDURE NOTES
Airway    Date/Time: 8/30/2024 9:31 AM    Performed by: Kate Malik M.D.  Authorized by: Kate Malik M.D.    Location:  OR  Urgency:  Elective  Indications for Airway Management:  Anesthesia      Spontaneous Ventilation: absent    Sedation Level:  Deep  Preoxygenated: Yes    Patient Position:  Sniffing  Mask Difficulty Assessment:  1 - vent by mask  Final Airway Type:  Endotracheal airway  Final Endotracheal Airway:  ETT  Cuffed: Yes    Technique Used for Successful ETT Placement:  Direct laryngoscopy  Devices/Methods Used in Placement:  Intubating stylet    Insertion Site:  Oral  Blade Type:  Quin  Laryngoscope Blade/Videolaryngoscope Blade Size:  3  ETT Size (mm):  7.0  Measured from:  Teeth  ETT to Teeth (cm):  22  Placement Verified by: auscultation and capnometry    Cormack-Lehane Classification:  Grade IIb - view of arytenoids or posterior of glottis only  Number of Attempts at Approach:  1

## 2024-08-30 NOTE — OR SURGEON
Immediate Post- Operative Note        Findings: DAVF      Procedure(s): tio embolization of dAVF    No residual DAVF/early draining vein at the end of the procedure.       Estimated Blood Loss: Less than 5 ml        Complications: None            8/30/2024     11:27 AM     Zane Arnold M.D.

## 2024-08-30 NOTE — CONSULTS
"Critical Care Consultation    Date of consult: 8/30/2024    Referring Physician  Dany De Oliveira M.D.    Reason for Consultation  Worst headache of life.    History of Presenting Illness  64 y.o. female with history of ischemic heart disease, renal cell carcinoma hypertension thyroid disease and migraines who presented 8/30/2024 with worst headache of her life associated with photophobia.  She denies nausea or vomiting numbness tingling or weakness.  She had similar symptoms a couple of days ago but the symptoms resolved on their own.  She was transported to [facility] emergency department for evaluation.  On arrival her blood pressure is 154/74.  A CT head with and without contrast revealed a subarachnoid hemorrhage along the tentorium of the right posterior fossa.  A 5.5 mm \"aneurysm-like\" structure was noted in the right posterior fossa concerning for arterial or venous aneurysm.  Vascular neurology and neurocritical care consultations were requested for further evaluation and management.    Code Status  No Order    Review of Systems  Review of Systems   Constitutional: Negative.    HENT: Negative.     Eyes:  Positive for photophobia. Negative for blurred vision and double vision.   Respiratory:  Negative for shortness of breath.    Cardiovascular:  Negative for chest pain.   Gastrointestinal:  Negative for nausea and vomiting.   Genitourinary: Negative.    Musculoskeletal: Negative.    Neurological:  Negative for dizziness, tingling and weakness.   Psychiatric/Behavioral: Negative.     All other systems reviewed and are negative.      Past Medical History   has a past medical history of Celiac disease (02/13/2015), Family history of ischemic heart disease (IHD), FH: renal cell carcinoma (02/13/2015), High cholesterol, Hyperlipidemia (02/13/2015), Hypertension, Migraine headache (02/13/2015), and Thyroid disease.    She has no past medical history of History of melanoma or Personal history of " renal cancer.    Surgical History   has a past surgical history that includes tonsillectomy; dilation and evacuation (1985 & 1987); hysteroscopy with myosure (N/A, 8/23/2023); and dilation and curettage (N/A, 8/23/2023).    Family History  family history includes Arthritis in her mother; Cancer in her brother, father, paternal grandfather, and sister; Glasses in her child, child, child, father, and mother; Glaucoma in her brother; Heart Attack (age of onset: 65) in her maternal uncle; Heart Disease in her maternal grandmother and mother; Lung Disease in her father; Other in her brother, child, mother, and sister.    Social History   reports that she quit smoking about 46 years ago. Her smoking use included cigarettes. She started smoking about 49 years ago. She has a 0.8 pack-year smoking history. She has never used smokeless tobacco. She reports current alcohol use of about 3.0 oz of alcohol per week. She reports that she does not use drugs.    Medications  Home Medications       Reviewed by Ramesh Chapman (Pharmacy Tech) on 08/30/24 at 0723  Med List Status: Complete     Medication Last Dose Status   acetaminophen (TYLENOL) 500 MG Tab 8/29/2024 Active   aspirin EC (ECOTRIN) 81 MG Tablet Delayed Response 8/29/2024 Active   atorvastatin (LIPITOR) 20 MG Tab 8/27/2024 Active   Cholecalciferol (VITAMIN D) 2000 units Cap 8/29/2024 Active   doxylamine (SLEEP AID) 25 MG Tab tablet FEW DAYS AGO Active   estradiol (ESTRACE) 0.1 MG/GM vaginal cream 8/27/2024 Active   ibuprofen (MOTRIN) 200 MG Tab 8/29/2024 Active   Multiple Vitamins-Minerals (WOMENS DAILY FORMULA PO) 8/29/2024 Active   risedronate (ACTONEL) 35 MG Tab NOT YET STARTED Active                  Audit from Redirected Encounters    **Home medications have not yet been reviewed for this encounter**       Current Facility-Administered Medications   Medication Dose Route Frequency Provider Last Rate Last Admin    Respiratory Therapy Consult   Nebulization  Continuous RT Dany De Oliveira M.D.        NS infusion   Intravenous Continuous Dany De Oliveira M.D.        ondansetron (Zofran ODT) dispertab 4 mg  4 mg Oral Q4HRS PRN Dany De Oliveira M.D.        Or    ondansetron (Zofran) syringe/vial injection 4 mg  4 mg Intravenous Q4HRS PRN Dany De Oliveira M.D.        acetaminophen (Tylenol) tablet 650 mg  650 mg Oral Q4HRS PRN Dany De Oliveira M.D.        Or    acetaminophen (Tylenol) suppository 650 mg  650 mg Rectal Q4HRS PRN Dany De Oliveira M.D.        LORazepam (Ativan) injection 2 mg  2 mg Intravenous Q5 MIN PRN Dany De Oliveira M.D. MD Alert...ICU Electrolyte Replacement per Pharmacy   Other PHARMACY TO DOSE Dany De Oliveira M.D.        niCARdipine (Cardene) 25 mg in  mL Standard Infusion  0-15 mg/hr Intravenous Continuous Dany De Oliveira M.D. 25 mL/hr at 08/30/24 0828 2.5 mg/hr at 08/30/24 0828       Allergies  Allergies   Allergen Reactions    Gluten Meal Diarrhea     Gluten intolerance     Pineapple Diarrhea     Diarrhea        Vital Signs last 24 hours  Temp:  [36.2 °C (97.2 °F)] 36.2 °C (97.2 °F)  Pulse:  [60-67] 65  Resp:  [12-20] 16  BP: (127-154)/(64-85) 146/64  SpO2:  [94 %-97 %] 97 %    Physical Exam  Physical Exam  Constitutional:       General: She is not in acute distress.     Comments: Lying in bed in no acute distress with sunglasses covering her eyes.   HENT:      Mouth/Throat:      Mouth: Mucous membranes are moist.   Eyes:      Pupils: Pupils are equal, round, and reactive to light.   Cardiovascular:      Rate and Rhythm: Normal rate and regular rhythm.      Heart sounds: No murmur heard.     No friction rub. No gallop.   Pulmonary:      Effort: No respiratory distress.      Breath sounds: No wheezing or rales.   Abdominal:      General: Abdomen is flat. There is no distension.      Palpations: Abdomen is soft.   Musculoskeletal:      Cervical back: Neck supple.      Right lower leg: No edema.      Left lower leg: No edema.   Skin:     General:  Skin is warm and dry.      Capillary Refill: Capillary refill takes less than 2 seconds.   Neurological:      General: No focal deficit present.      Mental Status: She is alert and oriented to person, place, and time.      Cranial Nerves: No cranial nerve deficit.      Comments: Awake alert, answering questions appropriately, speech is fluent.  No cranial nerve deficits.  No weakness or sensation deficits.   Psychiatric:         Mood and Affect: Mood normal.         Behavior: Behavior normal.         Fluids  No intake or output data in the 24 hours ending 08/30/24 0831    Laboratory  Recent Results (from the past 48 hour(s))   CBC WITH DIFFERENTIAL    Collection Time: 08/30/24  5:30 AM   Result Value Ref Range    WBC 5.4 4.8 - 10.8 K/uL    RBC 3.94 (L) 4.20 - 5.40 M/uL    Hemoglobin 12.6 12.0 - 16.0 g/dL    Hematocrit 37.0 37.0 - 47.0 %    MCV 93.9 81.4 - 97.8 fL    MCH 32.0 27.0 - 33.0 pg    MCHC 34.1 32.2 - 35.5 g/dL    RDW 41.6 35.9 - 50.0 fL    Platelet Count 176 164 - 446 K/uL    MPV 10.4 9.0 - 12.9 fL    Neutrophils-Polys 66.00 44.00 - 72.00 %    Lymphocytes 23.90 22.00 - 41.00 %    Monocytes 7.80 0.00 - 13.40 %    Eosinophils 1.30 0.00 - 6.90 %    Basophils 0.40 0.00 - 1.80 %    Immature Granulocytes 0.60 0.00 - 0.90 %    Nucleated RBC 0.00 0.00 - 0.20 /100 WBC    Neutrophils (Absolute) 3.54 1.82 - 7.42 K/uL    Lymphs (Absolute) 1.28 1.00 - 4.80 K/uL    Monos (Absolute) 0.42 0.00 - 0.85 K/uL    Eos (Absolute) 0.07 0.00 - 0.51 K/uL    Baso (Absolute) 0.02 0.00 - 0.12 K/uL    Immature Granulocytes (abs) 0.03 0.00 - 0.11 K/uL    NRBC (Absolute) 0.00 K/uL   COMP METABOLIC PANEL    Collection Time: 08/30/24  5:30 AM   Result Value Ref Range    Sodium 134 (L) 135 - 145 mmol/L    Potassium 3.5 (L) 3.6 - 5.5 mmol/L    Chloride 100 96 - 112 mmol/L    Co2 21 20 - 33 mmol/L    Anion Gap 13.0 7.0 - 16.0    Glucose 97 65 - 99 mg/dL    Bun 7 (L) 8 - 22 mg/dL    Creatinine 0.71 0.50 - 1.40 mg/dL    Calcium 8.7 8.5 - 10.5  mg/dL    Correct Calcium 8.6 8.5 - 10.5 mg/dL    AST(SGOT) 19 12 - 45 U/L    ALT(SGPT) 12 2 - 50 U/L    Alkaline Phosphatase 57 30 - 99 U/L    Total Bilirubin 0.5 0.1 - 1.5 mg/dL    Albumin 4.1 3.2 - 4.9 g/dL    Total Protein 6.5 6.0 - 8.2 g/dL    Globulin 2.4 1.9 - 3.5 g/dL    A-G Ratio 1.7 g/dL   APTT    Collection Time: 08/30/24  5:30 AM   Result Value Ref Range    APTT 26.9 24.7 - 36.0 sec   PROTHROMBIN TIME (INR)    Collection Time: 08/30/24  5:30 AM   Result Value Ref Range    PT 13.7 12.0 - 14.6 sec    INR 1.04 0.87 - 1.13   ESTIMATED GFR    Collection Time: 08/30/24  5:30 AM   Result Value Ref Range    GFR (CKD-EPI) 93 >60 mL/min/1.73 m 2   Lipid Profile - Fasting    Collection Time: 08/30/24  5:30 AM   Result Value Ref Range    Cholesterol,Tot 187 100 - 199 mg/dL    Triglycerides 72 0 - 149 mg/dL    HDL 91 >=40 mg/dL    LDL 82 <100 mg/dL       Imaging  CT-CTA HEAD WITH & W/O-POST PROCESS   Final Result         1.  There is aneurysm-like structure in the right posterior fossa near the midline measuring 5.5 mm, could represent arterial or venous aneurysm.   2.  Multiple tortuous vascular structures in the right posterior fossa compatible with vascular malformation   3.  No large vessel occlusion identified.   4.  Layering subarachnoid hemorrhage along the bilateral tentorium with subarachnoid hemorrhages in the right posterior fossa.      These findings were discussed with the patient's clinician, Nilson Chavez, on 8/30/2024 6:34 AM.      IR-EMBOLIZE-NEURO-INTRACRANIAL    (Results Pending)       Assessment/Plan  * SAH (subarachnoid hemorrhage) (HCC)- (present on admission)  Assessment & Plan  Subarachnoid hemorrhage etiology most suspicious for dural AV fistula versus AVM, possible aneurysm.  Strict blood pressure control  Goal SBP less than 140 mmHg.  Nicardipine infusion to achieve and maintain blood pressure goals  Serial neurologic exams every hour  Neuro IR consultation for angiogram and embolization  as indicated.  Neurosurgical consultation  Acute pain management.      Migraine with aura and without status migrainosus, not intractable- (present on admission)  Assessment & Plan  Multimodal pain management          Discussed patient condition and risk of morbidity and/or mortality with Family, RN, Pharmacy, Patient, and neurology and neurosurgery.      This patient remains at high risk for worsening central nervous system dysfunction, requiring frequent neurological assessment and strict blood pressure control.  I have assessed and reassessed the neurologic exam, blood pressure, and hemodynamics     Critical care time = 80 minutes in directly providing and coordinating critical care and extensive data review.  No time overlap and excludes procedures.

## 2024-08-30 NOTE — ANESTHESIA PROCEDURE NOTES
Arterial Line    Performed by: Kate Malik M.D.  Authorized by: Kate Malik M.D.    Localization: surface landmarks    Patient Location:  OR  Indication: continuous blood pressure monitoring        Catheter Size:  20 G  Seldinger Technique?: Yes    Laterality:  Left  Site:  Radial artery  Line Secured:  Antimicrobial disc, tape and transparent dressing  Events: patient tolerated procedure well with no complications

## 2024-08-30 NOTE — ANESTHESIA POSTPROCEDURE EVALUATION
Patient: Lily Sawant    Procedure Summary       Date: 08/30/24 Room / Location: [facility] - [facility] - [facility]    Anesthesia Start: 0917 Anesthesia Stop: 1137    Procedure: IR-EMBOLIZE-NEURO-INTRACRANIAL Diagnosis:       SAH (subarachnoid hemorrhage) (HCC)      SAH (subarachnoid hemorrhage) (HCC)      (HEADACHE)      (SAH)    Scheduled Providers: aZne Arnold M.D.; Kate Malik M.D. Responsible Provider: Kate Malik M.D.    Anesthesia Type: general ASA Status: 4 - Emergent            Final Anesthesia Type: general  Last vitals  BP   Blood Pressure : 114/74    Temp   36.3 °C (97.4 °F)    Pulse   (!) 59   Resp   12    SpO2   100 %      Anesthesia Post Evaluation    Patient location during evaluation: PACU  Patient participation: complete - patient participated  Level of consciousness: awake and alert  Pain score: 0    Airway patency: patent  Anesthetic complications: no  Cardiovascular status: adequate  Respiratory status: acceptable  Hydration status: acceptable    PONV: none          No notable events documented.     Nurse Pain Score: 0 (NPRS)

## 2024-08-30 NOTE — ED TRIAGE NOTES
Pt ambulated into triage with c/o headache. Pt sts she was swimming at [facility] on Wednesday when she experienced severe head pain and weakness. Pt  had to help patient out of water. Pt went home and went to bed and started to feel better and is now getting worse again. Pt has not been seen for this. Pt spouse is an oncologist here and checked for signs of stroke. Pt was neurologically intact. Pt sts this pain is worse than her normal migraines, which she has a history of. Pt is A&O and ambulatory at this time. Pt took Advil and tylenol for headaches which was helping initially.

## 2024-08-30 NOTE — ANESTHESIA TIME REPORT
Anesthesia Start and Stop Event Times       Date Time Event    8/30/2024 0900 Ready for Procedure     0917 Anesthesia Start     1137 Anesthesia Stop          Responsible Staff  08/30/24      Name Role Begin End    Kate Malik M.D. Anesth 0917 1137          Overtime Reason:  no overtime (within assigned shift)    Comments:

## 2024-08-30 NOTE — ANESTHESIA PREPROCEDURE EVALUATION
Date/Time: 08/30/24 0900    Scheduled providers: Zane Arnold M.D.; Kate Malik M.D.    Procedure: IR-EMBOLIZE-NEURO-INTRACRANIAL    Diagnosis:       SAH (subarachnoid hemorrhage) (HCC) [I60.9]      SAH (subarachnoid hemorrhage) (HCC) [I60.9]    Indications:       HEADACHE      SAH    Location: [facility]            Relevant Problems   NEURO   (positive) Migraine with aura and without status migrainosus, not intractable      CARDIAC   (positive) Atherosclerosis of both carotid arteries, mild per 2016 Ultrasound   (positive) Migraine with aura and without status migrainosus, not intractable         (positive) Renal cyst, left      Other   (positive) 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       Physical Exam    Airway   Mallampati: I  TM distance: >3 FB  Neck ROM: full       Cardiovascular - normal exam     Dental - normal exam           Pulmonary   Breath sounds clear to auscultation     Abdominal    Neurological - normal exam                   Anesthesia Plan    ASA 4- EMERGENT   ASA physical status 4 criteria: CVA or TIA - recent (< 3 months)ASA physical status emergent criteria: acute hemorrhage    Plan - general       Airway plan will be ETT          Induction: intravenous      Pertinent diagnostic labs and testing reviewed    Informed Consent:    Anesthetic plan and risks discussed with patient.

## 2024-08-30 NOTE — DISCHARGE PLANNING
[facility] Transitional Care Coordination     Referral from: Dr De Oliveira  Insurance Provider on Facesheet: [facility]  Potential Rehab Diagnosis: R/o stroke     Chart review indicates patient may have on going medical management and may have therapy needs to possibly meet inpatient rehab facility criteria with the goal of returning to community.     D/C support: Spouse     Physiatry consultation pended per protocol. Pending work up and therapy evals, TCC will follow.     Thank you for the referral.

## 2024-08-30 NOTE — THERAPY
Speech Language Therapy Contact Note    Patient Name: Lily Sawant  Age:  64 y.o., Sex:  female  Medical Record #: [id number]  Today's Date: 8/30/2024 08/30/24 1026   Treatment Variance   Reason For Missed Therapy Medical - Patient Is Not Medically Stable   Interdisciplinary Plan of Care Collaboration   Collaboration Comments Orders received for swallow and cognitive evaluations. Per chart review, patient intubated 8/30. Will hold until extubated.

## 2024-08-30 NOTE — CONSULTS
NEUROLOGY INITIAL CONSULT NOTE  Neurohospitalist Service, [facility]    Referring Physician: Dany De Oliveira M.D.    STROKE CODE:   Chief Complaint   Patient presents with    Headache         HPI: Lily Sawant is a pleasant 64 y.o. right-handed female with history of hypertension, renal cell carcinoma, hyperlipidemia, migraine headache, thyroid disease and ischemic heart disease who presented with worst headache of her life associated with neck pain and photophobia.  She has no other neurological deficits such as numbness or weakness.  Apparently she had another episode few days ago which was self-limited and resolved on its own.  She was not hypertensive at presentation.  She underwent a brain CT which revealed possible subarachnoid hemorrhage along the tentorium of the right posterior fossa, there is also a 5.5 mm aneurysm like structure in right posterior fossa concerning for arterial or venous aneurysm.  In my view this appears to be venous engorgement.      Review of systems: In addition to what is detailed in the HPI above, all other systems reviewed and are negative.    Past Medical History:    has a past medical history of Celiac disease (02/13/2015), Family history of ischemic heart disease (IHD), FH: renal cell carcinoma (02/13/2015), High cholesterol, Hyperlipidemia (02/13/2015), Hypertension, Migraine headache (02/13/2015), and Thyroid disease.    She has no past medical history of History of melanoma or Personal history of renal cancer.    FHx:  family history includes Arthritis in her mother; Cancer in her brother, father, paternal grandfather, and sister; Glasses in her child, child, child, father, and mother; Glaucoma in her brother; Heart Attack (age of onset: 65) in her maternal uncle; Heart Disease in her maternal grandmother and mother; Lung Disease in her father; Other in her brother, child, mother, and sister.    SHx:   reports that she quit smoking about 46  "years ago. Her smoking use included cigarettes. She started smoking about 49 years ago. She has a 0.8 pack-year smoking history. She has never used smokeless tobacco. She reports current alcohol use of about 3.0 oz of alcohol per week. She reports that she does not use drugs.    Allergies:  Allergies   Allergen Reactions    Gluten Meal Diarrhea     Gluten intolerance     Pineapple Diarrhea     Diarrhea        Medications:    Current Facility-Administered Medications:     Respiratory Therapy Consult, , Nebulization, Continuous RT, Dany De Oliveira M.D.    NS infusion, , Intravenous, Continuous, Dany De Oliveira M.D., Held at 08/30/24 0715    ondansetron (Zofran ODT) dispertab 4 mg, 4 mg, Oral, Q4HRS PRN **OR** ondansetron (Zofran) syringe/vial injection 4 mg, 4 mg, Intravenous, Q4HRS PRN, Dany De Oliveira M.D.    acetaminophen (Tylenol) tablet 650 mg, 650 mg, Oral, Q4HRS PRN **OR** acetaminophen (Tylenol) suppository 650 mg, 650 mg, Rectal, Q4HRS PRN, Dany De Oliveira M.D.    LORazepam (Ativan) injection 2 mg, 2 mg, Intravenous, Q5 MIN PRN, Dany De Oliveira M.D.    MD Alert...ICU Electrolyte Replacement per Pharmacy, , Other, PHARMACY TO DOSE, Dany De Oliveira M.D.    niCARdipine (Cardene) 25 mg in  mL Standard Infusion, 0-15 mg/hr, Intravenous, Continuous, Dany De Oliveira M.D., Last Rate: 25 mL/hr at 08/30/24 0828, 2.5 mg/hr at 08/30/24 0828    FENTANYL CITRATE (PF) 0.05 MG/ML INJ SOLN (WRAPPED), , , ,     SUGAMMADEX SODIUM 200 MG/2ML IV SOLN, , , ,     potassium chloride (KCL) ivpb 10 mEq, 10 mEq, Intravenous, Q HOUR, Dany De Oliveira M.D.    Physical Examination:    Vitals:    08/30/24 0800 08/30/24 0820 08/30/24 0830 08/30/24 0845   BP: (!) 146/64  (!) 144/74 (!) 157/75   Pulse: 65  69 70   Resp: 16  13 15   Temp:  36.8 °C (98.2 °F)     TempSrc:  Temporal     SpO2:       Weight:  62.1 kg (136 lb 14.5 oz)     Height:  1.68 m (5' 6.14\")         General:   Patient is awake and in no acute distress  Neck: Full range " of motion, although she has some neck rigidity and stiffness.  Eyes: Midline, Pupils reactive to light.  CV: RRR  Lungs: No respiratory distress  Extremities: No cyanosis, warm, no significant edema.    NEUROLOGICAL EXAM:   Mental status: Awake, alert and fully oriented, follows commands  Speech and language: speech is not dysarthric. The patient is able to name and repeat.  Cranial nerve exam: Pupils are equal, round and reactive to light bilaterally. Visual fields are full. Extraocular muscles are intact. Sensation in the face is intact to light touch. Face is symmetric. Hearing to finger rub equal. Palate elevates symmetrically. Shoulder shrug is full. Tongue is midline.  Motor exam: Sustain antigravity in all 4 extremities with no downward drift. Tone is normal. No abnormal movements were seen on exam.  Sensory exam: No sensory deficits identified   Coordination: no gross ataxia noted on exam  Plantar reflexes: Equivocal  Gait: deferred given patient preference    Baseline modified [place] Scale (MRS): 0 = No symptoms    Duarte and Ansari Classification of Subarachnoid Hemorrhage: 2= Full Nuchal Rigidity, Moderate-Severe Headache, Alert and Oriented, No Neuro Deficit (Besides CN Palsy)    Modified Shore Scale: 1      Objective Data:    Labs:  Lab Results   Component Value Date/Time    PROTHROMBTM 13.7 08/30/2024 05:30 AM    INR 1.04 08/30/2024 05:30 AM      Lab Results   Component Value Date/Time    WBC 5.4 08/30/2024 05:30 AM    RBC 3.94 (L) 08/30/2024 05:30 AM    HEMOGLOBIN 12.6 08/30/2024 05:30 AM    HEMATOCRIT 37.0 08/30/2024 05:30 AM    MCV 93.9 08/30/2024 05:30 AM    MCH 32.0 08/30/2024 05:30 AM    MCHC 34.1 08/30/2024 05:30 AM    MPV 10.4 08/30/2024 05:30 AM    NEUTSPOLYS 66.00 08/30/2024 05:30 AM    LYMPHOCYTES 23.90 08/30/2024 05:30 AM    MONOCYTES 7.80 08/30/2024 05:30 AM    EOSINOPHILS 1.30 08/30/2024 05:30 AM    BASOPHILS 0.40 08/30/2024 05:30 AM      Lab Results   Component Value Date/Time    SODIUM 138  08/30/2024 08:40 AM    POTASSIUM 3.5 (L) 08/30/2024 05:30 AM    CHLORIDE 100 08/30/2024 05:30 AM    CO2 21 08/30/2024 05:30 AM    GLUCOSE 97 08/30/2024 05:30 AM    BUN 7 (L) 08/30/2024 05:30 AM    CREATININE 0.71 08/30/2024 05:30 AM    BUNCREATRAT 22 03/17/2022 09:00 PM      Lab Results   Component Value Date/Time    CHOLSTRLTOT 187 08/30/2024 05:30 AM    LDL 82 08/30/2024 05:30 AM    HDL 91 08/30/2024 05:30 AM    TRIGLYCERIDE 72 08/30/2024 05:30 AM       Lab Results   Component Value Date/Time    ALKPHOSPHAT 57 08/30/2024 05:30 AM    ASTSGOT 19 08/30/2024 05:30 AM    ALTSGPT 12 08/30/2024 05:30 AM    TBILIRUBIN 0.5 08/30/2024 05:30 AM        @I/O@    No intake or output data in the 24 hours ending 08/30/24 0922    Imaging/Testing:    I interpreted and/or reviewed the patient's neuroimaging    CT-CTA HEAD WITH & W/O-POST PROCESS   Final Result         1.  There is aneurysm-like structure in the right posterior fossa near the midline measuring 5.5 mm, could represent arterial or venous aneurysm.   2.  Multiple tortuous vascular structures in the right posterior fossa compatible with vascular malformation   3.  No large vessel occlusion identified.   4.  Layering subarachnoid hemorrhage along the bilateral tentorium with subarachnoid hemorrhages in the right posterior fossa.      These findings were discussed with the patient's clinician, Nilson Chavez, on 8/30/2024 6:34 AM.      IR-EMBOLIZE-NEURO-INTRACRANIAL    (Results Pending)       Assessment:  Lily Sawant is a pleasant 64 y.o. right-handed female with history of hypertension, renal cell carcinoma, hyperlipidemia, migraine headache, thyroid disease and ischemic heart disease who presented with worst headache of her life associated with neck pain and photophobia.  She has no other neurological deficits such as numbness or weakness.  Apparently she had another episode few days ago which was self-limited and resolved on its own.  She was not hypertensive  at presentation.  She underwent a brain CT which revealed possible subarachnoid hemorrhage along the tentorium of the right posterior fossa, there is also a 5.5 mm aneurysm like structure in right posterior fossa concerning for arterial or venous aneurysm.  In my view this appears to be venous engorgement.  Case was discussed with neuro IR and she will be taken to IR for conventional angiogram and possible embolization.    Plan:  - q1h and PRN neuro assessment. VS per nursing/unit protocol.   - SBP goal 100-140; currently blood pressure is stable without any intervention, may need to liberalize blood pressure in coming days to mitigate vasospasm.  - Antihypertensives per primary team.   - Please call neurology with further concern for neurological decline or new/focal neurological deficits and obtain Stat CT head.   - Telemetry   - Maintain strict normothermia, eunatremia, euglycemia, FSBS .   - Net even I/O, normal Na goal 135-145 for now, monitor for cerebral salt  wasting, Current/most recent Na is 138  - Avoid blood thinners ie. anticoagulation, and antithrombotic  - no known underlying coagulopathy, maintain INR < 1.5, platelets > 100k  - No report of seizure activity, no indication for keppra prophylaxis from Neurology perspective  - All other medical management per ICU team and per neurosurgery.   - DVT Prophylaxis: SCDs.      The plan of care above has been discussed with Dany De Oliveira M.D.    Upon my evaluation, this patient had a high probability of imminent or life-threatening deterioration due to subarachnoid hemorrhage which required my direct attention, intervention, and personal management.  I personally provided 50 minutes. Time includes: review of laboratory data, review of radiology studies, discussion with consultants, discussion with family/patient, monitoring for potential decompensation.  Interventions were performed as documented in the chart.      Please note that this dictation was  created using voice recognition software.  I have made every reasonable attempt to correct obvious errors, but I expect that there are errors of grammar and possibly content that I did not discover before finalizing the note.         Desirae Atkinson MD  [facility]

## 2024-08-30 NOTE — CONSULTS
DATE OF SERVICE:  08/30/2024     NEUROSURGICAL CONSULTATION     HISTORY OF PRESENT ILLNESS:  The patient is a very pleasant 64-year-old woman,   who is well known to me.  She and her  Roman are friends of mine and Roman   works at [employer]  She was in her usual state of health in the water at [facility] on Wednesday and had acute onset of a horrible headache.  She does have   a history of migraines so she was not particularly alarmed by this, had to be   helped out of the water.  She went home and felt better and then overnight   she started to feel significantly worse.  CT scan and CTA demonstrated a   questionable AVM versus dural AV fistula with a trace amount of subarachnoid   hemorrhage and her  called me for a consultation.     PAST MEDICAL HISTORY:  Celiac disease,high cholesterol, hyperlipidemia,   hypertension, migraines.     PAST SURGICAL HISTORY:  Hysteroscopy, D and C, tonsillectomy.     FAMILY HISTORY:  Noncontributory.     SOCIAL HISTORY:  She is a former smoker, quit in the remote past 46 years ago.    Occasional ETOH.  No illicits.     MEDICATIONS:  Aspirin, Lipitor, vitamin D, Actonel, multivitamin.     ALLERGIES:  GLUTEN AND PINEAPPLE.     PHYSICAL EXAMINATION:  GENERAL:  Awake, alert and oriented x3.  GCS of 15.  HEENT:  Pupils equal, round, reactive to light.  Extraocular muscles intact.    Tongue midline.  Face symmetric.  NEUROLOGIC:  Motor is 5/5 strength in all muscle groups in the upper and lower   extremities.  No drift.  Sensory grossly intact to light touch.     LABORATORY VALUES:  CBC within normal limits.  Basic metabolic panel within   normal limits except for sodium of 134, potassium of 3.5.  INR and PTT are   normal.     IMAGING:  CT scan of the head with a CT angiogram shows a right-sided   paramedian posterior fossa aneurysm.  It is unclear whether this is arterial   or venous.  No large vessel occlusion.  She has a trace amount of subarachnoid   hemorrhage versus a  subdural hemorrhage along the tentorium and a trace   amount of subarachnoid hemorrhage in the posterior fossa.  There is no   hydrocephalus.  There is no shift.  There is no mass effect.     PLAN:  Admit to the ICU.  Systolic less than 160.  The patient needs a TEG and   potential platelets given her aspirin use.  She does not need antiepileptic   at this point.  We will follow closely.  She will get an angiogram today with   potential embolization if necessary.  Neurosurgery will follow.        ______________________________  Michael Cain MD    CPD/JEFF    DD:  08/30/2024 08:16  DT:  08/30/2024 08:35    Job#:  [id number]

## 2024-08-30 NOTE — OR NURSING
1136- Pt arrives to PACU from IR on 5L of oxygen via mask. Report received. R groin dressing CDI, 1+ pulse in R foot.    1206- Report called to Kyle VEGA. Site CDI.

## 2024-08-30 NOTE — PROGRESS NOTES
4 Eyes Skin Assessment Completed by Kyle RN and GARY Zaldivar.    Head WDL  Ears WDL  Nose WDL  Mouth WDL  Neck WDL  Breast/Chest WDL  Shoulder Blades WDL  Spine WDL  (R) Arm/Elbow/Hand WDL  (L) Arm/Elbow/Hand WDL  Abdomen WDL  Groin WDL  Scrotum/Coccyx/Buttocks WDL  (R) Leg WDL  (L) Leg WDL  (R) Heel/Foot/Toe WDL  (L) Heel/Foot/Toe WDL          Devices In Places ECG, Blood Pressure Cuff, Pulse Ox, and SCD's      Interventions In Place TAP System, Pillows, Q2 Turns, Low Air Loss Mattress, and Heels Loaded W/Pillows    Possible Skin Injury No    Pictures Uploaded Into Epic N/A  Wound Consult Placed N/A  RN Wound Prevention Protocol Ordered No

## 2024-08-30 NOTE — ED PROVIDER NOTES
ER Provider Note    Scribed for Dr. Nilson Chavez M.D. by Dk Oliver. 8/30/2024  5:06 AM    Primary Care Provider: Felix Campos D.O.    CHIEF COMPLAINT  Chief Complaint   Patient presents with    Headache     EXTERNAL RECORDS REVIEWED  The patient's records show the patient had a dilation curettage  and polypectomy in August 2023.     HPI/ROS  LIMITATION TO HISTORY   Select: : None    OUTSIDE HISTORIAN(S):  Significant other was present at bedside to provide additional context to history     Lily Sawant is a 64 y.o. female who presents to the ED for a headache onset 2 days ago The patient explains that she had a headache maximal and onset [place] [facility] on Wednesday of this week described as an 8.5/10 in pain. She explains that she was concerned about her  in the water and decided to exert herself by swimming after him which resulted in the headache. The patient was able to lay down on the beach afterwards and had mild improvement but was experiencing severe photophobia. The patient's  is an oncologist and performed a neurological exam at this time which was reassuring. The patient then returned home and felt normal,  but woke up that night with a terrible headache. She explains that the headache starts in her neck and will then radiate to her head. Denies any fevers, chills, abdominal pain, coughing, or other symptoms. States associated nausea and vomiting. She states a history of migraines that are similar but less severe. Reports a history of taking Topamax but has recently switched to taking a baby aspirin a day.     PAST MEDICAL HISTORY  Past Medical History:   Diagnosis Date    Celiac disease 02/13/2015    Family history of ischemic heart disease (IHD)     FH: renal cell carcinoma 02/13/2015    High cholesterol     Hyperlipidemia 02/13/2015    Hypertension     Migraine headache 02/13/2015    Thyroid disease     nodules     SURGICAL HISTORY  Past Surgical History:    Procedure Laterality Date    HYSTEROSCOPY WITH MYOSURE N/A 8/23/2023    Procedure: HYSTEROSCOPY DILATION AND CURETTAGE, POLYPECTOMY;  Surgeon: Vivian De LaP az D.O.;  Location: SURGERY SAME DAY [facility];  Service: Obstetrics    DILATION AND CURETTAGE N/A 8/23/2023    Procedure: DILATION AND CURETTAGE;  Surgeon: Vivian De La Paz D.O.;  Location: SURGERY SAME DAY [facility];  Service: Obstetrics    DILATION AND EVACUATION  1985 & 1987    TONSILLECTOMY       FAMILY HISTORY  Family History   Problem Relation Age of Onset    Arthritis Mother     Glasses Mother     Other Mother     Heart Disease Mother     Cancer Sister         Melanoma    Other Sister     Cancer Father         Renal & Lung    Lung Disease Father     Glasses Father     Cancer Brother         Melanoma - eye    Other Brother     Glaucoma Brother     Glasses Child     Other Child         SIDS    Glasses Child     Glasses Child     Cancer Paternal Grandfather         Renal    Heart Disease Maternal Grandmother     Heart Attack Maternal Uncle 65        [place]     SOCIAL HISTORY   reports that she quit smoking about 46 years ago. Her smoking use included cigarettes. She started smoking about 49 years ago. She has a 0.8 pack-year smoking history. She has never used smokeless tobacco. She reports current alcohol use of about 3.0 oz of alcohol per week. She reports that she does not use drugs.    CURRENT MEDICATIONS  Previous Medications    ASPIRIN EC (ECOTRIN) 81 MG TABLET DELAYED RESPONSE    Take 81 mg by mouth every day.    ATORVASTATIN (LIPITOR) 20 MG TAB    TAKE 1 TABLET BY MOUTH ONCE DAILY AT BEDTIME    CHOLECALCIFEROL (VITAMIN D) 2000 UNITS CAP    Take 2,000 Units by mouth every day.    ESTRADIOL (ESTRACE) 0.1 MG/GM VAGINAL CREAM    Insert 0.5-1g nightly x 1 week then twice weekly thereafter for maintenance therapy    MULTIPLE VITAMINS-MINERALS (WOMENS DAILY FORMULA PO)    Take  by mouth.    RISEDRONATE (ACTONEL) 35 MG TAB    Take 1 Tablet by mouth every 7  "days.     ALLERGIES  Gluten meal and Pineapple    PHYSICAL EXAM  /85   Pulse 66   Temp 36.2 °C (97.2 °F) (Temporal)   Resp 18   Ht 1.676 m (5' 6\")   Wt 62.4 kg (137 lb 9.1 oz)   LMP 08/25/2008   SpO2 96%   BMI 22.20 kg/m²     Constitutional: Alert in mild distress, wearing sunglasses at bedside  HENT: No signs of trauma, Bilateral external ears normal, Nose normal.   Eyes: Pupils are equal and reactive, Conjunctiva normal, Non-icteric.   Neck: Normal range of motion, No tenderness, Supple,   Cardiovascular: Regular rate and rhythm, no murmurs.   Thorax & Lungs: Normal breath sounds, No respiratory distress, No wheezing, No chest tenderness.   Abdomen: Bowel sounds normal, Soft, No tenderness,   Skin: Warm, Dry, No erythema, No rash.   Back: No bony tenderness, No CVA tenderness.   Extremities: No edema, No tenderness, No cyanosis  Neurologic: Symmetric smile, eyes shut tight bilaterally, forehead wrinkles bilaterally, sensation intact to light touch bilateral face, tongue midline, head turn and shoulder shrug with full strength. Hearing intact grossly bilaterally. 5/5  strength bilaterally, 5/5 tricep and bicep strength bilaterally. Sensation intact to light touch r, m, u, axillary nerves bilaterally. 5/5 strength quadricep, plantarflexion/dorsiflexion/extensor hallicus longus bilaterally. Sensation intact to light touch bilateral lower extremities in all nerve distributions.  Intact finger-to-nose, gait nml   Psychiatric: Affect normal     DIAGNOSTIC STUDIES & PROCEDURES    Labs:   Labs Reviewed   CBC WITH DIFFERENTIAL - Abnormal; Notable for the following components:       Result Value    RBC 3.94 (*)     All other components within normal limits   COMP METABOLIC PANEL - Abnormal; Notable for the following components:    Sodium 134 (*)     Potassium 3.5 (*)     Bun 7 (*)     All other components within normal limits   APTT   PROTHROMBIN TIME   ESTIMATED GFR   LIPID PROFILE   SODIUM SERUM (NA) "   PLATELET MAPPING WITH BASIC TEG   All labs reviewed by me.    Radiology:   The attending Emergency Physician has independently interpreted the diagnostic imaging associated with this visit and is awaiting the final reading from the radiologist, which will be displayed below.  Preliminary interpretation is a follows: Subarachnoid hemorrhage noted posteriorly  Radiologist interpretation:   CT-CTA HEAD WITH & W/O-POST PROCESS   Final Result         1.  There is aneurysm-like structure in the right posterior fossa near the midline measuring 5.5 mm, could represent arterial or venous aneurysm.   2.  Multiple tortuous vascular structures in the right posterior fossa compatible with vascular malformation   3.  No large vessel occlusion identified.   4.  Layering subarachnoid hemorrhage along the bilateral tentorium with subarachnoid hemorrhages in the right posterior fossa.      These findings were discussed with the patient's clinician, Nilson Chavez, on 8/30/2024 6:34 AM.         COURSE & MEDICAL DECISION MAKING    ED Observation Status? No; Patient does not meet criteria for ED Observation.     INITIAL ASSESSMENT AND PLAN  Care Narrative:       5:06 AM - Patient seen and evaluated at bedside. Discussed plan of care, including imaging and labs to evaluate. Patient agrees to plan of care. Patient will be treated with Benadryl 25 mg injection and Reglan 10 mg injection for her symptoms. Ordered CT-Head w/ and w/Out post processing, Prothrombin Time, APTT, CMP, and CBC w/Diff to evaluate.    6:38 AM - Spoke with Dr. Mendiola (radiology) who informed me of a critical finding. Paged neurology     6:39 - AM Spoke with Dr. Atkinson (neurology)about the patient's condition. The patient will be admitted to the ICU and receive further testing. Paged intensivist.     6:55 AM I discussed the patient's case and the above findings with Dr. De Oliveira (Intensivist) who agrees to evaluate the patient for hospitalization.    CRITICAL  CARE  The very real possibility of a deterioration of this patient's condition required the highest level of my preparedness for sudden, emergent intervention.  I provided critical care services, which included medication orders, frequent reevaluations of the patient's condition and response to treatment, ordering and reviewing test results, and discussing the case with various consultants.  The critical care time associated with the care of the patient was 35 minutes. Review chart for interventions. This time is exclusive of any other billable procedures.     ADDITIONAL PROBLEM LIST AND DISPOSITION     Patient with sudden onset headache.  Concern for subarachnoid bleed versus mass.  CT imaging of the brain did show bleed.  Spoke to neurology as well as critical care.  Will control the patient's blood pressure to a systolic below 140.  Patient was admitted to the intensivist in guarded condition.  No coagulopathy.  No significant electrolyte derangement.  Stable at this time.             DISPOSITION AND DISCUSSIONS  I have discussed management of the patient with the following physicians and DANIE's: Dr. Mendiola (radiology), Dr. Atkinson (neurology), Dr. De Oliveira (Intensivist)    Discussion of management with other [facility] [place] or appropriate source(s): Radiologist to discuss a critical finding       Barriers to care at this time, including but not limited to:  None .     Decision tools and prescription drugs considered including, but not limited to:  Antihypertensives as needed. .    DISPOSITION:  Patient will be hospitalized by Dr. De Oliveira (Intensivist) in critical condition.    FINAL IMPRESSION   1. SAH (subarachnoid hemorrhage) (HCC)    2. 35 minutes CCT     Dk MAY (Scribe), am scribing for, and in the presence of, Nilson Chavez M.D..    Electronically signed by: Dk Oliver (Scribe), 8/30/2024    Nilson MAY M.D. personally performed the services described in this documentation, as scribed by Dk Oliver in my  presence, and it is both accurate and complete.    The note accurately reflects work and decisions made by me.  Nilson Chavez M.D.  8/30/2024  11:10 AM

## 2024-08-31 VITALS
TEMPERATURE: 98.4 F | BODY MASS INDEX: 22 KG/M2 | OXYGEN SATURATION: 91 % | HEIGHT: 66 IN | DIASTOLIC BLOOD PRESSURE: 58 MMHG | WEIGHT: 136.91 LBS | HEART RATE: 61 BPM | RESPIRATION RATE: 36 BRPM | SYSTOLIC BLOOD PRESSURE: 115 MMHG

## 2024-08-31 DIAGNOSIS — I67.1 CEREBROVASCULAR MALFORMATION, DURAL AV FISTULA: ICD-10-CM

## 2024-08-31 LAB
ANION GAP SERPL CALC-SCNC: 11 MMOL/L (ref 7–16)
BASOPHILS # BLD AUTO: 0 % (ref 0–1.8)
BASOPHILS # BLD: 0 K/UL (ref 0–0.12)
BUN SERPL-MCNC: 9 MG/DL (ref 8–22)
CALCIUM SERPL-MCNC: 8.3 MG/DL (ref 8.5–10.5)
CHLORIDE SERPL-SCNC: 106 MMOL/L (ref 96–112)
CO2 SERPL-SCNC: 20 MMOL/L (ref 20–33)
CREAT SERPL-MCNC: 0.53 MG/DL (ref 0.5–1.4)
EOSINOPHIL # BLD AUTO: 0 K/UL (ref 0–0.51)
EOSINOPHIL NFR BLD: 0 % (ref 0–6.9)
ERYTHROCYTE [DISTWIDTH] IN BLOOD BY AUTOMATED COUNT: 42.4 FL (ref 35.9–50)
GFR SERPLBLD CREATININE-BSD FMLA CKD-EPI: 102 ML/MIN/1.73 M 2
GLUCOSE BLD STRIP.AUTO-MCNC: 138 MG/DL (ref 65–99)
GLUCOSE BLD STRIP.AUTO-MCNC: 159 MG/DL (ref 65–99)
GLUCOSE BLD STRIP.AUTO-MCNC: 165 MG/DL (ref 65–99)
GLUCOSE SERPL-MCNC: 152 MG/DL (ref 65–99)
HCT VFR BLD AUTO: 34.2 % (ref 37–47)
HGB BLD-MCNC: 11.9 G/DL (ref 12–16)
IMM GRANULOCYTES # BLD AUTO: 0.02 K/UL (ref 0–0.11)
IMM GRANULOCYTES NFR BLD AUTO: 0.3 % (ref 0–0.9)
LYMPHOCYTES # BLD AUTO: 0.66 K/UL (ref 1–4.8)
LYMPHOCYTES NFR BLD: 10.6 % (ref 22–41)
MAGNESIUM SERPL-MCNC: 2.2 MG/DL (ref 1.5–2.5)
MCH RBC QN AUTO: 32.4 PG (ref 27–33)
MCHC RBC AUTO-ENTMCNC: 34.8 G/DL (ref 32.2–35.5)
MCV RBC AUTO: 93.2 FL (ref 81.4–97.8)
MONOCYTES # BLD AUTO: 0.2 K/UL (ref 0–0.85)
MONOCYTES NFR BLD AUTO: 3.2 % (ref 0–13.4)
NEUTROPHILS # BLD AUTO: 5.33 K/UL (ref 1.82–7.42)
NEUTROPHILS NFR BLD: 85.9 % (ref 44–72)
NRBC # BLD AUTO: 0 K/UL
NRBC BLD-RTO: 0 /100 WBC (ref 0–0.2)
PLATELET # BLD AUTO: 173 K/UL (ref 164–446)
PMV BLD AUTO: 10.6 FL (ref 9–12.9)
POTASSIUM SERPL-SCNC: 3.7 MMOL/L (ref 3.6–5.5)
RBC # BLD AUTO: 3.67 M/UL (ref 4.2–5.4)
SODIUM SERPL-SCNC: 137 MMOL/L (ref 135–145)
SODIUM SERPL-SCNC: 139 MMOL/L (ref 135–145)
WBC # BLD AUTO: 6.2 K/UL (ref 4.8–10.8)

## 2024-08-31 PROCEDURE — 92526 ORAL FUNCTION THERAPY: CPT

## 2024-08-31 PROCEDURE — 99233 SBSQ HOSP IP/OBS HIGH 50: CPT | Performed by: INTERNAL MEDICINE

## 2024-08-31 PROCEDURE — 80048 BASIC METABOLIC PNL TOTAL CA: CPT

## 2024-08-31 PROCEDURE — 700102 HCHG RX REV CODE 250 W/ 637 OVERRIDE(OP): Performed by: INTERNAL MEDICINE

## 2024-08-31 PROCEDURE — 85025 COMPLETE CBC W/AUTO DIFF WBC: CPT

## 2024-08-31 PROCEDURE — 700102 HCHG RX REV CODE 250 W/ 637 OVERRIDE(OP): Mod: JZ | Performed by: INTERNAL MEDICINE

## 2024-08-31 PROCEDURE — 82962 GLUCOSE BLOOD TEST: CPT | Mod: 91

## 2024-08-31 PROCEDURE — 92523 SPEECH SOUND LANG COMPREHEN: CPT

## 2024-08-31 PROCEDURE — 99232 SBSQ HOSP IP/OBS MODERATE 35: CPT | Performed by: PSYCHIATRY & NEUROLOGY

## 2024-08-31 PROCEDURE — A9270 NON-COVERED ITEM OR SERVICE: HCPCS | Performed by: INTERNAL MEDICINE

## 2024-08-31 PROCEDURE — 83735 ASSAY OF MAGNESIUM: CPT

## 2024-08-31 PROCEDURE — 84295 ASSAY OF SERUM SODIUM: CPT | Mod: 91

## 2024-08-31 PROCEDURE — 700111 HCHG RX REV CODE 636 W/ 250 OVERRIDE (IP): Mod: JZ | Performed by: INTERNAL MEDICINE

## 2024-08-31 PROCEDURE — 700102 HCHG RX REV CODE 250 W/ 637 OVERRIDE(OP): Performed by: NURSE PRACTITIONER

## 2024-08-31 PROCEDURE — A9270 NON-COVERED ITEM OR SERVICE: HCPCS | Mod: JZ | Performed by: INTERNAL MEDICINE

## 2024-08-31 PROCEDURE — 770022 HCHG ROOM/CARE - ICU (200)

## 2024-08-31 PROCEDURE — 700111 HCHG RX REV CODE 636 W/ 250 OVERRIDE (IP): Mod: JZ | Performed by: NURSE PRACTITIONER

## 2024-08-31 RX ORDER — GABAPENTIN 100 MG/1
100 CAPSULE ORAL 3 TIMES DAILY
Status: DISCONTINUED | OUTPATIENT
Start: 2024-08-31 | End: 2024-09-01 | Stop reason: HOSPADM

## 2024-08-31 RX ORDER — POTASSIUM CHLORIDE 1500 MG/1
40 TABLET, EXTENDED RELEASE ORAL ONCE
Status: COMPLETED | OUTPATIENT
Start: 2024-08-31 | End: 2024-08-31

## 2024-08-31 RX ADMIN — FENTANYL CITRATE 12.5 MCG: 50 INJECTION, SOLUTION INTRAMUSCULAR; INTRAVENOUS at 11:19

## 2024-08-31 RX ADMIN — GABAPENTIN 100 MG: 100 CAPSULE ORAL at 16:07

## 2024-08-31 RX ADMIN — POTASSIUM CHLORIDE 40 MEQ: 1500 TABLET, EXTENDED RELEASE ORAL at 10:09

## 2024-08-31 RX ADMIN — INSULIN HUMAN 2 UNITS: 100 INJECTION, SOLUTION PARENTERAL at 20:47

## 2024-08-31 RX ADMIN — DEXAMETHASONE SODIUM PHOSPHATE 4 MG: 4 INJECTION INTRA-ARTICULAR; INTRALESIONAL; INTRAMUSCULAR; INTRAVENOUS; SOFT TISSUE at 05:22

## 2024-08-31 RX ADMIN — ACETAMINOPHEN 1000 MG: 500 TABLET ORAL at 13:16

## 2024-08-31 RX ADMIN — ACETAMINOPHEN 1000 MG: 500 TABLET ORAL at 05:22

## 2024-08-31 RX ADMIN — DEXAMETHASONE SODIUM PHOSPHATE 4 MG: 4 INJECTION INTRA-ARTICULAR; INTRALESIONAL; INTRAMUSCULAR; INTRAVENOUS; SOFT TISSUE at 18:23

## 2024-08-31 RX ADMIN — INSULIN HUMAN 2 UNITS: 100 INJECTION, SOLUTION PARENTERAL at 12:23

## 2024-08-31 RX ADMIN — INSULIN HUMAN 2 UNITS: 100 INJECTION, SOLUTION PARENTERAL at 18:42

## 2024-08-31 RX ADMIN — ACETAMINOPHEN 1000 MG: 500 TABLET ORAL at 18:23

## 2024-08-31 RX ADMIN — DEXAMETHASONE SODIUM PHOSPHATE 4 MG: 4 INJECTION INTRA-ARTICULAR; INTRALESIONAL; INTRAMUSCULAR; INTRAVENOUS; SOFT TISSUE at 11:57

## 2024-08-31 ASSESSMENT — ENCOUNTER SYMPTOMS
FEVER: 0
TINGLING: 0
SPEECH CHANGE: 0
HEADACHES: 1
CHILLS: 0
NAUSEA: 0
PHOTOPHOBIA: 1
WEAKNESS: 0
SHORTNESS OF BREATH: 0
VOMITING: 0
PALPITATIONS: 0
SENSORY CHANGE: 0

## 2024-08-31 ASSESSMENT — PAIN DESCRIPTION - PAIN TYPE
TYPE: ACUTE PAIN

## 2024-08-31 NOTE — THERAPY
"Speech Language Pathology   Daily Treatment     Patient Name: Lily Sawant  AGE:  64 y.o., SEX:  female  Medical Record #: [id number]  Date of Service: 8/31/2024      Precautions:  Precautions: Fall Risk, Swallow Precautions         Subjective  Patient received awake, sitting upright in bed consuming lunch tray. Pt denied any difficulty consuming meals. Pt reported occasional history of globus sensation with pills; provided education regarding possibly trying whole pills with pudding/applesauce to assist with pharyngeal clearance. Pt stated understanding and is agreeable to try.       Assessment  Patient self-feeding without difficulty. No anterior bolus loss. Functional mastication of solids. No overt s/sx of airway invasion throughout PO. Vocal quality remained stable throughout meal. Pt denied presence of globus sensation. Reviewed aspiration precautions and signs, pt stated understanding.       Clinical Impressions  Patient presents with a functional oropharyngeal swallow. No overt s/sx of aspiration. Recommend continuation of oral diet. Further acute speech therapy intervention is not indicated. SLP to sign off; please re-consult with any changes or concerns.       Recommendations  Diet Consistency: Regular solids, thin liquids  Instrumentation: None indicated at this time  Medication: As tolerated  Supervision: Independent  Positioning: Fully upright and midline during oral intake, Meals sitting upright in a chair, as tolerated  Risk Management : Small bites/sips, Physical mobility, as tolerated  Oral Care: BID       SLP Treatment Plan  Treatment Plan: None Indicated       Anticipated Discharge Needs  Discharge Recommendations: Anticipate that the patient will have no further speech therapy needs after discharge from the hospital  Therapy Recommendations Upon DC: Not Indicated      Patient / Family Goals  Patient / Family Goal #1: \"Good\"  Goal #1 Outcome: Goal met  Short Term Goals  Short Term Goal # 1: " Pt will consume rg/tn diet with no overt s/sx of aspiration or decline in pulmonary status  Goal Outcome # 1: Goal met      Odilia Daniel, SLP

## 2024-08-31 NOTE — PROGRESS NOTES
Neurosurgery Progress Note    Subjective:  No events overnight. S/p endovascular embolization of dural AV fistula yesterday.  Lily feels better, less headache. Eating breakfast.    Exam:  Awake, alert, oriented to person, place and time.  Pupils equally round and reactive to light, 5 to 3mm.  Extraocular movements full.  Facial sensation intact to light touch.  Face symmetric, HB 1.  Palate rises symmetrically.  Tongue is midline.  Shoulder shrug 5/5.  No pronator drift.  Patient moves all 4 extremities with full strength equally.  Sensation intact to light touch in all 4 extremities.      Recent Labs     08/30/24  0530 08/31/24  0512   WBC 5.4 6.2   RBC 3.94* 3.67*   HEMOGLOBIN 12.6 11.9*   HEMATOCRIT 37.0 34.2*   MCV 93.9 93.2   MCH 32.0 32.4   MCHC 34.1 34.8   RDW 41.6 42.4   PLATELETCT 176 173   MPV 10.4 10.6     Recent Labs     08/30/24  0530 08/30/24  0840 08/30/24  1802 08/31/24  0015 08/31/24  0512   SODIUM 134*   < > 137 139 137   POTASSIUM 3.5*  --   --   --  3.7   CHLORIDE 100  --   --   --  106   CO2 21  --   --   --  20   GLUCOSE 97  --   --   --  152*   BUN 7*  --   --   --  9   CREATININE 0.71  --   --   --  0.53   CALCIUM 8.7  --   --   --  8.3*    < > = values in this interval not displayed.     Recent Labs     08/30/24  0530   APTT 26.9   INR 1.04     Recent Labs     08/30/24 0840   REACTMIN 5.0   CLOTKINET 1.1   CLOTANGL 75.1   MAXCLOTS 60.2   PRCINADP 6.6   PRCINAA 3.9       Intake/Output                         08/30/24 0700 - 08/31/24 0659 08/31/24 0700 - 09/01/24 0659 0700-1859 1900-0659 Total [id number] 1900-0659 Total                 Intake    P.O.  --  480 480  --  -- --    P.O. -- 480 480 -- -- --    I.V.  2051.4  870.3 2921.6  --  -- --    Cardene Volume 162.3 17.6 179.9 -- -- --    Volume (mL) (NS infusion) 389.1 852.7 1241.7 -- -- --    Volume (mL) (NS infusion) 500 -- 500 -- -- --    Volume (mL) (electrolyte-A (Plasmalyte-A) infusion) 1000 -- 1000 -- -- --    Other  180  -- 180  --   -- --    Medications (PO/Enteral Liquids) 180 -- 180 -- -- --    Total Intake 2231.4 1350.3 3581.6 -- -- --       Output    Urine  --  1600 1600  --  -- --    Number of Times Voided 1 x 2 x 3 x -- -- --    Urine Void (mL) -- 1600 1600 -- -- --    Stool  --  -- --  --  -- --    Number of Times Stooled 0 x 0 x 0 x -- -- --    Total Output -- 1600 1600 -- -- --       Net I/O     2231.4 -249.7 1981.6 -- -- --            Imaging:    MRI brain w/ and w/out dated 8/31/24 reviewed in detail. 1.  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 There is no MR evidence of residual fistula. 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 Prominent cerebellar veins are again seen which is the sequela of the   dural AV fistula in the expected to be resolved in future.  2.  Catheter angiogram is recommended after 3 months.  3.  Minimal subdural hemorrhage.  4.  Minimal subarachnoid hemorrhage.    Assessment and Plan:  Patient is hospital day # 2  S/p endovascular embolization of ruptured dural AV fistula 8/30     Chemical prophylactic DVT therapy: Yes - Lovenox 40mg/qd    Start date/time: when cleared from IR    - No plans for neurosurgical intervention at this time  - Continue ICU care  - OK to mobilize    Updated family at bedside. Discussed with Dr. Gutierrez.    Please call with questions.    Mirian Hurst M.D.     My total time spent caring for the patient on the day of the encounter was 35 minutes for ICU care.  This does not include time spent on separately billable procedures/tests.

## 2024-08-31 NOTE — THERAPY
Speech Language Pathology   Cognitive Evaluation     Patient Name: Lily Sawant  AGE:  64 y.o., SEX:  female  Medical Record #: [id number]  Date of Service: 2024      History of Present Illness  64F admitted on 24 with headache while swimming in [facility]. Patient found to have SAH. PMH notable for migraine, celiac disease, glaucoma, insomnia, thyroid nodules. Patient has a gluten and pineapple allergy, lactose sensativity.     Not previously seen by service per EMR.      Imagin24 CT/A Head:   1.  There is aneurysm-like structure in the right posterior fossa near the midline measuring 5.5 mm, could represent arterial or venous aneurysm.  2.  Multiple tortuous vascular structures in the right posterior fossa compatible with vascular malformation  3.  No large vessel occlusion identified.  4.  Layering subarachnoid hemorrhage along the bilateral tentorium with subarachnoid hemorrhages in the right posterior fossa.     No chest imaging to review      General Information  Vitals  O2 Delivery Device: None - Room Air  Level of Consciousness: Awake, Alert  Orientation: Oriented x 4  Follows Directives: Yes      Prior Living Situation & Level of Function  Housing / Facility: [address]  Lives with - Patient's Self Care Capacity: Spouse  Communication: WFL  Swallowing: WFL       Subjective  Patient and spouse denied any acute cognitive changes. Pt very pleasant and cooperative with SLP evaluation.        Assessment  The patient was seen this date for a cognitive evaluation. Portions of the COGNISTAT (The Neurobehavioral Cognitive Status Examination), as well as non-standardized assessments were utilized. Results are as follows:      Cognistat  Orientation: Average  Attention: Average  Comprehension: Average  Repetition: Average  Naming: Average  Memory: Average  Calculations: Average  Similarities: Average  Judgement: Average      Medication Management  Patient endorsed taking daily medications,  "relayed a functional routine. Pt read and answered provided written questions regarding medication management with 100% accuracy. Provided a functional memory-enhancing strategy.      Comments:   Patient participated in conversation with fluent, intelligible speech. No anomia, perseveration, or paraphasias. Attended to all structured tasks without redirection required. Timely response time throughout evaluation.          Clinical Impressions  Per results of formal and informal cognitive evaluations, the patient presents with cognition consistent with or approaching their baseline level of functioning. Therefore, no further acute speech pathology services are indicated at this time. Should higher level deficits impact the patient's ability to resume premorbid activities, recommend outpatient speech therapy follow up.      NOTE: It is not within the scope of practice of Speech-Language Pathologists to determine patient capacity. Please defer to the physician or psych to complete this assessment.       Recommendations  Supervision Needs Upons Discharge: None  IADLs: N/A         SLP Treatment Plan  Treatment Plan: None Indicated  SLP Frequency: N/A - Evaluation Only       Anticipated Discharge Needs  Discharge Recommendations: Anticipate that the patient will have no further speech therapy needs after discharge from the hospital  Therapy Recommendations Upon DC: Not Indicated      Patient / Family Goals  Patient / Family Goal #1: \"Good\"  Goal #1 Outcome: Goal met  Short Term Goal # 1: Pt will consume rg/tn diet with no overt s/sx of aspiration or decline in pulmonary status  Goal Outcome # 1: Goal met      WOODY Gonsales  "

## 2024-08-31 NOTE — PROGRESS NOTES
"Critical Care Progress Note    Date of admission  8/30/2024    Chief Complaint  64 y.o. female admitted 8/30/2024 with SAH    Hospital Course  \"64 y.o. female with history of ischemic heart disease, renal cell carcinoma hypertension thyroid disease and migraines who presented 8/30/2024 with worst headache of her life associated with photophobia.  She denies nausea or vomiting numbness tingling or weakness.  She had similar symptoms a couple of days ago but the symptoms resolved on their own.  She was transported to [facility] emergency department for evaluation.  On arrival her blood pressure is 154/74.  A CT head with and without contrast revealed a subarachnoid hemorrhage along the tentorium of the right posterior fossa.  A 5.5 mm \"aneurysm-like\" structure was noted in the right posterior fossa concerning for arterial or venous aneurysm.  Vascular neurology and neurocritical care consultations were requested for further evaluation and management.\"    Interval Problem Update  Reviewed last 24 hour events:   - dAVF embolized yesterday   - Neuro: AOx4   - HR: 50s-70s   - SBP: 100s-130s on nicardipine   - GI: tolerating diet, last BM PTA   - UOP: 1.6 L/24 hrs   - Petersen: no   - Tm: 37.2   - Lines: PIV, art   - PPx: GI not indicated, DVT contraindicated   - RA   - CXR (personally reviewed and compared to prior): no new   - Liberalize ambulation and neuro checks. Plan for D/C tomorrow    Review of Systems  Review of Systems   Unable to perform ROS: Critical illness   Neurological:  Positive for headaches.        Vital Signs for last 24 hours   Temp:  [36.2 °C (97.2 °F)-37.2 °C (99 °F)] 36.8 °C (98.2 °F)  Pulse:  [56-78] 64  Resp:  [7-37] 15  BP: (114-157)/(60-75) 118/60  SpO2:  [92 %-100 %] 93 %    Hemodynamic parameters for last 24 hours       Respiratory Information for the last 24 hours       Physical Exam   Physical Exam  Vitals and nursing note reviewed.   Constitutional:       Appearance: She is " ill-appearing.   HENT:      Head: Normocephalic and atraumatic.      Right Ear: External ear normal.      Left Ear: External ear normal.      Nose: Nose normal.      Mouth/Throat:      Mouth: Mucous membranes are moist.      Pharynx: Oropharynx is clear.   Eyes:      Extraocular Movements: Extraocular movements intact.      Conjunctiva/sclera: Conjunctivae normal.      Pupils: Pupils are equal, round, and reactive to light.   Cardiovascular:      Rate and Rhythm: Normal rate and regular rhythm.      Pulses: Normal pulses.   Pulmonary:      Effort: Pulmonary effort is normal.      Breath sounds: Normal breath sounds.   Abdominal:      General: Bowel sounds are normal.      Palpations: Abdomen is soft.   Musculoskeletal:         General: Normal range of motion.      Cervical back: Neck supple.   Skin:     General: Skin is warm and dry.      Capillary Refill: Capillary refill takes less than 2 seconds.   Neurological:      General: No focal deficit present.      Mental Status: She is alert and oriented to person, place, and time. Mental status is at baseline.      Sensory: No sensory deficit.      Motor: No weakness.   Psychiatric:         Mood and Affect: Mood normal.         Behavior: Behavior normal.         Medications  Current Facility-Administered Medications   Medication Dose Route Frequency Provider Last Rate Last Admin    Respiratory Therapy Consult   Nebulization Continuous RT Dany De Oliveira M.D.        NS infusion   Intravenous Continuous Dany De Oliveira M.D. 80 mL/hr at 08/31/24 0001 Restarted at 08/31/24 0001    ondansetron (Zofran ODT) dispertab 4 mg  4 mg Oral Q4HRS PRN Dany De Oliveira M.D.        Or    ondansetron (Zofran) syringe/vial injection 4 mg  4 mg Intravenous Q4HRS PRN Dany De Oliveira M.D.        LORazepam (Ativan) injection 2 mg  2 mg Intravenous Q5 MIN PRN Dany De Oliveira M.D. MD Alert...ICU Electrolyte Replacement per Pharmacy   Other PHARMACY TO DOSE Dany De Oliveira M.D.         niCARdipine (Cardene) 25 mg in  mL Standard Infusion  0-15 mg/hr Intravenous Continuous Dany De Oliveira M.D. 25 mL/hr at 08/30/24 1234 2.5 mg/hr at 08/30/24 1234    iohexol (OMNIPAQUE) 300 mg/mL  140 mL Intra-arterial Once Dany De Oliveira M.D.        dexamethasone (Decadron) injection 4 mg  4 mg Intravenous Q6HRS Dany De Oliveira M.D.   4 mg at 08/31/24 0522    acetaminophen (Tylenol) tablet 1,000 mg  1,000 mg Oral Q6HRS Dany De Oliveira M.D.   1,000 mg at 08/31/24 0522    insulin regular (HumuLIN R,NovoLIN R) injection  2-9 Units Subcutaneous 4X/DAY ACHS Karlee L. Latona        And    dextrose 50% (D50W) injection 25 g  25 g Intravenous Q15 MIN PRN Karlee L. Latona        fentaNYL (Sublimaze) injection 12.5-25 mcg  12.5-25 mcg Intravenous Q HOUR PRN Karlee L. Latona   12.5 mcg at 08/30/24 2242    senna-docusate (Pericolace Or Senokot S) 8.6-50 MG per tablet 2 Tablet  2 Tablet Oral Q EVENING Karlee L. Latona   2 Tablet at 08/30/24 2352    And    polyethylene glycol/lytes (Miralax) Packet 1 Packet  1 Packet Oral QDAY PRN Karlee L. Latona           Fluids    Intake/Output Summary (Last 24 hours) at 8/31/2024 0738  Last data filed at 8/31/2024 0600  Gross per 24 hour   Intake 3581.63 ml   Output 1600 ml   Net 1981.63 ml       Laboratory          Recent Labs     08/30/24  0530 08/30/24  0840 08/30/24  1802 08/31/24  0015 08/31/24  0512   SODIUM 134*   < > 137 139 137   POTASSIUM 3.5*  --   --   --  3.7   CHLORIDE 100  --   --   --  106   CO2 21  --   --   --  20   BUN 7*  --   --   --  9   CREATININE 0.71  --   --   --  0.53   MAGNESIUM  --   --   --   --  2.2   CALCIUM 8.7  --   --   --  8.3*    < > = values in this interval not displayed.     Recent Labs     08/30/24  0530 08/31/24 0512   ALTSGPT 12  --    ASTSGOT 19  --    ALKPHOSPHAT 57  --    TBILIRUBIN 0.5  --    GLUCOSE 97 152*     Recent Labs     08/30/24 0530 08/31/24 0512   WBC 5.4 6.2   NEUTSPOLYS 66.00 85.90*   LYMPHOCYTES 23.90 10.60*   MONOCYTES 7.80  3.20   EOSINOPHILS 1.30 0.00   BASOPHILS 0.40 0.00   ASTSGOT 19  --    ALTSGPT 12  --    ALKPHOSPHAT 57  --    TBILIRUBIN 0.5  --      Recent Labs     08/30/24  0530 08/31/24  0512   RBC 3.94* 3.67*   HEMOGLOBIN 12.6 11.9*   HEMATOCRIT 37.0 34.2*   PLATELETCT 176 173   PROTHROMBTM 13.7  --    APTT 26.9  --    INR 1.04  --        Imaging  CT:    Reviewed    Assessment/Plan  * SAH (subarachnoid hemorrhage) (HCC)- (present on admission)  Assessment & Plan  Subarachnoid hemorrhage etiology: dural AV fistula  S/P dAVF embolization on 8/30  Strict blood pressure control, goal SBP less than 140 mmHg.  Serial neurologic exams every 2 hrs while awake q4 at night  Neuro IR, neuro and Neurosurgery following  Acute pain management.      Migraine with aura and without status migrainosus, not intractable- (present on admission)  Assessment & Plan  Multimodal pain management  APAP, decadron, Neurontin. Fentanyl if needed         VTE:  Contraindicated  Ulcer: Not Indicated  Lines: Arterial Line  Ongoing indication addressed    I have performed a physical exam and reviewed and updated ROS and Plan today (8/31/2024). In review of yesterday's note (8/30/2024), there are no changes except as documented above.     Discussed patient condition and risk of morbidity and/or mortality with Family, RN, RT, Pharmacy, Charge nurse / hot rounds, Patient, and neurology and neurosurgery    Please note that this dictation was created using voice recognition software. The accuracy of the dictation is limited to the abilities of the software. I have made every reasonable attempt to correct obvious errors, but I expect that there are errors of grammar and possibly content that I did not discover before finalizing the note.

## 2024-08-31 NOTE — PROGRESS NOTES
Neuro Radiology Progress Note   Author: BRIANNE Hagen Date & Time created: 8/31/2024  8:17 AM   Date of admission  8/30/2024  Note to reader: this note follows the APSO format rather than the historical SOAP format. Assessment and plan located at the top of the note for ease of use.    Chief Complaint  64 y.o. female admitted 8/30/2024 with   Chief Complaint   Patient presents with    Headache         HPI  64-year-old female with past medical history of HTN, HLD, migraines, celiac disease presented 08/30/2024 for sudden onset severe headache with photophobia.  She had similar symptoms a couple days prior to coming to the ER while swimming in [facility] but symptoms resolved on its own.  CTA head showed subarachnoid hemorrhage along bilateral tentorium and right posterior fossa, 5.5 mm aneurysm-like structure in the right posterior fossa, and right posterior fossa vascular structures compatible with vascular malformation.  Neuro IR was consulted and patient underwent a cerebral angiogram with successful [place] embolization of dural AV fistula with JEANNE Arnold on 08/30/2024.     Interval History:   08/31/24 -no acute events overnight.  Neuro intact.  Patient reports headache with photophobia; improved since admission and more in line with her usual migraine.  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 Pt discussed with neurology.    Assessment/Plan     Principal Problem:    SAH (subarachnoid hemorrhage) (HCC)  Active Problems:    Migraine with aura and without status migrainosus, not intractable      Plan IR  - S/p [place] embolization of dural AV fistula (08/30/24)  - Post JEANNE groin access site instructions: no lifting greater than 5 lbs and no baths/swimming/soaking in tub for 7-10 days. Shower OK. OK to change dressings/band aid as needed.  - Follow up appointment in 4  weeks with OP Neuro IR, our office to call and schedule  - Recommend follow-up angiogram in 3 months which can also be arranged by our office  - Neuro Checks per ICU policy  - Plan to discharge tomorrow if patient remains stable.  - From a Neuro Interventional Service standpoint, OK to discharge when medically cleared.    -  -Thank you for allowing Interventional Radiology team to participate in the patients care, if any additional care or requests are needed in the future please do not hesitate to call or place IR order           Review of Systems  Physical Exam   Review of Systems   Constitutional:  Negative for chills and fever.   Eyes:  Positive for photophobia.   Respiratory:  Negative for shortness of breath.    Cardiovascular:  Negative for chest pain and palpitations.   Gastrointestinal:  Negative for nausea and vomiting.   Neurological:  Positive for headaches. Negative for tingling, sensory change, speech change and weakness.      Vitals:    08/31/24 0600   BP:    Pulse: 64   Resp: 15   Temp: 36.8 °C (98.2 °F)   SpO2: 93%        Physical Exam  Constitutional:       Appearance: Normal appearance.   Cardiovascular:      Rate and Rhythm: Normal rate.   Pulmonary:      Effort: Pulmonary effort is normal. No respiratory distress.   Abdominal:      General: There is no distension.   Skin:     General: Skin is warm and dry.      Comments: Right groin access site soft, non-tender, without ecchymosis; dressing cdi.     Neurological:      Mental Status: She is alert.      Comments: A/O x 4  PEARLA  Face symmetrical  Speech fluent  Shoulder shrug intact  Antigravity with no downward drift in all extremities  5/5 strength in all extremities  Sensation intact    Psychiatric:         Mood and Affect: Mood normal.         Behavior: Behavior normal.             Labs    Recent Labs     08/30/24  0530 08/31/24  0512   WBC 5.4 6.2   RBC 3.94* 3.67*   HEMOGLOBIN 12.6 11.9*   HEMATOCRIT 37.0 34.2*   MCV 93.9 93.2   MCH 32.0  "32.4   MCHC 34.1 34.8   RDW 41.6 42.4   PLATELETCT 176 173   MPV 10.4 10.6     Recent Labs     08/30/24  0530 08/30/24  0840 08/30/24  1802 08/31/24  0015 08/31/24  0512   SODIUM 134*   < > 137 139 137   POTASSIUM 3.5*  --   --   --  3.7   CHLORIDE 100  --   --   --  106   CO2 21  --   --   --  20   GLUCOSE 97  --   --   --  152*   BUN 7*  --   --   --  9   CREATININE 0.71  --   --   --  0.53   CALCIUM 8.7  --   --   --  8.3*    < > = values in this interval not displayed.     Recent Labs     08/30/24  0530 08/31/24  0512   ALBUMIN 4.1  --    TBILIRUBIN 0.5  --    ALKPHOSPHAT 57  --    TOTPROTEIN 6.5  --    ALTSGPT 12  --    ASTSGOT 19  --    CREATININE 0.71 0.53     CT-CTA HEAD WITH & W/O-POST PROCESS   Final Result         1.  There is aneurysm-like structure in the right posterior fossa near the midline measuring 5.5 mm, could represent arterial or venous aneurysm.   2.  Multiple tortuous vascular structures in the right posterior fossa compatible with vascular malformation   3.  No large vessel occlusion identified.   4.  Layering subarachnoid hemorrhage along the bilateral tentorium with subarachnoid hemorrhages in the right posterior fossa.      These findings were discussed with the patient's clinician, Nilson Chavez, on 8/30/2024 6:34 AM.      IR-EMBOLIZE-NEURO-INTRACRANIAL    (Results Pending)   MR-BRAIN-WITH & W/O    (Results Pending)     INR   Date Value Ref Range Status   08/30/2024 1.04 0.87 - 1.13 Final     Comment:     INR - Non-therapeutic Reference Range: 0.87-1.13  INR - Therapeutic Reference Range: 2.0-4.0       No results found for: \"POCINR\"     Intake/Output Summary (Last 24 hours) at 8/31/2024 0817  Last data filed at 8/31/2024 0600  Gross per 24 hour   Intake 3581.63 ml   Output 1600 ml   Net 1981.63 ml      I have personally reviewed the above labs and imaging      I have performed a physical exam and reviewed and updated ROS and Plan today (8/31/2024).     45 minutes in directly providing " and coordinating care and extensive data review.  No time overlap and excludes procedures.

## 2024-08-31 NOTE — PROGRESS NOTES
Neurology Progress Note  Neurohospitalist Service, [facility]    Referring Physician: FLORENTIN Bernard Jr..O.    Chief Complaint   Patient presents with    Headache       HPI: Refer to initial documented Neurology H&P, as detailed in the patient's chart.    Interval History:   No acute events overnight, no new complaints.  Still has headache which is less severe but associated with photophobia which appears migrainous.  She is status post embolization of dural AV fistula.  Brain MRI with no evidence of residual fistula.  Venous aneurysm is thrombosed.  There is minimal amount of subdural and subarachnoid hemorrhage noted on MRI.        Review of systems: In addition to what is detailed in the HPI and/or updated in the interval history, all other systems reviewed and are negative.    Past Medical History:    has a past medical history of Celiac disease (02/13/2015), Family history of ischemic heart disease (IHD), FH: renal cell carcinoma (02/13/2015), High cholesterol, Hyperlipidemia (02/13/2015), Hypertension, Migraine headache (02/13/2015), and Thyroid disease.    She has no past medical history of History of melanoma or Personal history of renal cancer.    FHx:  family history includes Arthritis in her mother; Cancer in her brother, father, paternal grandfather, and sister; Glasses in her child, child, child, father, and mother; Glaucoma in her brother; Heart Attack (age of onset: 65) in her maternal uncle; Heart Disease in her maternal grandmother and mother; Lung Disease in her father; Other in her brother, child, mother, and sister.    SHx:   reports that she quit smoking about 46 years ago. Her smoking use included cigarettes. She started smoking about 49 years ago. She has a 0.8 pack-year smoking history. She has never used smokeless tobacco. She reports current alcohol use of about 3.0 oz of alcohol per week. She reports that she does not use drugs.    Medications:    Current  Facility-Administered Medications:     Respiratory Therapy Consult, , Nebulization, Continuous RT, Dany De Oliveira M.D.    NS infusion, , Intravenous, Continuous, Dany De Oliveira M.D., Last Rate: 80 mL/hr at 08/31/24 0001, Restarted at 08/31/24 0001    ondansetron (Zofran ODT) dispertab 4 mg, 4 mg, Oral, Q4HRS PRN **OR** ondansetron (Zofran) syringe/vial injection 4 mg, 4 mg, Intravenous, Q4HRS PRN, Dany De Oliveira M.D.    LORazepam (Ativan) injection 2 mg, 2 mg, Intravenous, Q5 MIN PRN, Dany De Oliveira M.D. MD Alert...ICU Electrolyte Replacement per Pharmacy, , Other, PHARMACY TO DOSE, Dany De Oliveira M.D.    niCARdipine (Cardene) 25 mg in  mL Standard Infusion, 0-15 mg/hr, Intravenous, Continuous, Dany De Oliveira M.D., Last Rate: 25 mL/hr at 08/30/24 1234, 2.5 mg/hr at 08/30/24 1234    iohexol (OMNIPAQUE) 300 mg/mL, 140 mL, Intra-arterial, Once, Dany De Oliveira M.D.    dexamethasone (Decadron) injection 4 mg, 4 mg, Intravenous, Q6HRS, Dany De Oliveira M.D., 4 mg at 08/31/24 1157    acetaminophen (Tylenol) tablet 1,000 mg, 1,000 mg, Oral, Q6HRS, Dany De Oliveira M.D., 1,000 mg at 08/31/24 0522    insulin regular (HumuLIN R,NovoLIN R) injection, 2-9 Units, Subcutaneous, 4X/DAY ACHS, 2 Units at 08/31/24 1223 **AND** POC blood glucose manual result, , , Q AC AND BEDTIME(S) **AND** NOTIFY MD and PharmD, , , Once **AND** Administer 20 grams of glucose (approximately 8 ounces of fruit juice) every 15 minutes PRN FSBG less than 70 mg/dL, , , PRN **AND** dextrose 50% (D50W) injection 25 g, 25 g, Intravenous, Q15 MIN PRN, Karlee Gutierres    fentaNYL (Sublimaze) injection 12.5-25 mcg, 12.5-25 mcg, Intravenous, Q HOUR PRN, Karlee Gutierres, 12.5 mcg at 08/31/24 1119    senna-docusate (Pericolace Or Senokot S) 8.6-50 MG per tablet 2 Tablet, 2 Tablet, Oral, Q EVENING, 2 Tablet at 08/30/24 7632 **AND** polyethylene glycol/lytes (Miralax) Packet 1 Packet, 1 Packet, Oral, QDAY PRN, Karlee Gutierres    Physical  Examination:    Vitals:    08/31/24 0800 08/31/24 0900 08/31/24 1000 08/31/24 1200   BP:   124/57 (!) 155/72   Pulse: (!) 55 65 73 64   Resp:       Temp: 36.8 °C (98.2 °F)      TempSrc: Temporal      SpO2: 94%      Weight:       Height:           General:   Patient is awake and in no acute distress  Neck: Full range of motion  Eyes: Midline, Pupils reactive to light.  CV: RRR  Lungs: No respiratory distress  Extremities: No cyanosis, warm, no significant edema.    NEUROLOGICAL EXAM:   Mental status: Awake, alert and fully oriented, follows commands  Speech and language: speech is not dysarthric. The patient is able to name and repeat.  Cranial nerve exam: Pupils are equal, round and reactive to light bilaterally. Visual fields are full. Extraocular muscles are intact.   Face is symmetric, facial sensation is intact.   Motor exam: Sustain antigravity in all 4 extremities with no downward drift. Tone is normal. No abnormal movements were seen on exam.  Sensory exam: No sensory deficits identified   Coordination: no gross ataxia noted on exam  Plantar reflexes: Equivocal  Gait: deferred     Objective Data:    Labs:  Lab Results   Component Value Date/Time    PROTHROMBTM 13.7 08/30/2024 05:30 AM    INR 1.04 08/30/2024 05:30 AM      Lab Results   Component Value Date/Time    WBC 6.2 08/31/2024 05:12 AM    RBC 3.67 (L) 08/31/2024 05:12 AM    HEMOGLOBIN 11.9 (L) 08/31/2024 05:12 AM    HEMATOCRIT 34.2 (L) 08/31/2024 05:12 AM    MCV 93.2 08/31/2024 05:12 AM    MCH 32.4 08/31/2024 05:12 AM    MCHC 34.8 08/31/2024 05:12 AM    MPV 10.6 08/31/2024 05:12 AM    NEUTSPOLYS 85.90 (H) 08/31/2024 05:12 AM    LYMPHOCYTES 10.60 (L) 08/31/2024 05:12 AM    MONOCYTES 3.20 08/31/2024 05:12 AM    EOSINOPHILS 0.00 08/31/2024 05:12 AM    BASOPHILS 0.00 08/31/2024 05:12 AM      Lab Results   Component Value Date/Time    SODIUM 137 08/31/2024 05:12 AM    POTASSIUM 3.7 08/31/2024 05:12 AM    CHLORIDE 106 08/31/2024 05:12 AM    CO2 20 08/31/2024  05:12 AM    GLUCOSE 152 (H) 08/31/2024 05:12 AM    BUN 9 08/31/2024 05:12 AM    CREATININE 0.53 08/31/2024 05:12 AM    BUNCREATRAT 22 03/17/2022 09:00 PM      Lab Results   Component Value Date/Time    CHOLSTRLTOT 187 08/30/2024 05:30 AM    LDL 82 08/30/2024 05:30 AM    HDL 91 08/30/2024 05:30 AM    TRIGLYCERIDE 72 08/30/2024 05:30 AM       Lab Results   Component Value Date/Time    ALKPHOSPHAT 57 08/30/2024 05:30 AM    ASTSGOT 19 08/30/2024 05:30 AM    ALTSGPT 12 08/30/2024 05:30 AM    TBILIRUBIN 0.5 08/30/2024 05:30 AM        Imaging/Testing:    I interpreted and/or reviewed the patient's neuroimaging    MR-BRAIN-WITH & W/O   Final Result      1.  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 There is no MR evidence of residual fistula. 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 Prominent cerebellar veins are again seen which is the sequela of the    dural AV fistula in the expected to be resolved in future.   2.  Catheter angiogram is recommended after 3 months.   3.  Minimal subdural hemorrhage.   4.  Minimal subarachnoid hemorrhage.         CT-CTA HEAD WITH & W/O-POST PROCESS   Final Result         1.  There is aneurysm-like structure in the right posterior fossa near the midline measuring 5.5 mm, could represent arterial or venous aneurysm.   2.  Multiple tortuous vascular structures in the right posterior fossa compatible with vascular malformation   3.  No large vessel occlusion identified.   4.  Layering subarachnoid hemorrhage along the bilateral tentorium with subarachnoid hemorrhages in the right posterior fossa.      These findings were discussed with the patient's clinician, Nilson Chavez, on 8/30/2024 6:34 AM.      IR-EMBOLIZE-NEURO-INTRACRANIAL    (Results Pending)       Assessment and Plan:  Lliy Sawant is a pleasant 64 y.o. right-handed female with history of hypertension, renal cell carcinoma, hyperlipidemia, migraine headache, thyroid disease  and ischemic heart disease who presented with worst headache of her life associated with neck pain and photophobia.  She has no other neurological deficits such as numbness or weakness.  She was found to have a dural AV fistula for which she underwent endovascular embolization.  Neurologically remains intact except for some headaches.  Brain MRI with no evidence of residual fistula.  Venous aneurysm is thrombosed.  There is minimal amount of subdural and subarachnoid hemorrhage noted on MRI.    Plan:  -Okay with every 2 hour neurocheck, every 4 hours at night.  - Systolic blood pressure 100-140.  - If continue to remain stable, okay to discharge tomorrow.  - Follow-up with neuro IR in 4 to 6 weeks.  -Discussed with patient and family at bedside.    The evaluation of the patient, and recommended management, was discussed with Dany Gutierrez Jr., D.O.    Please note that this dictation was created using voice recognition software. I have made every reasonable attempt to correct obvious errors, but I expect that there are errors of grammar and possibly content that I did not discover before finalizing the note.      Desirae Atkinson MD  [facility]

## 2024-08-31 NOTE — CARE PLAN
The patient is Watcher - Medium risk of patient condition declining or worsening    Shift Goals  Clinical Goals: Q1 Neuro assessments, SBP <140, pain management  Patient Goals: Sleep, Pain management  Family Goals: Pain management, stable neuro assessments    Progress made toward(s) clinical / shift goals:    Problem: Knowledge Deficit - Standard  Goal: Patient and family/care givers will demonstrate understanding of plan of care, disease process/condition, diagnostic tests and medications  Outcome: Progressing     Problem: Neuro Status  Goal: Neuro status will remain stable or improve  Outcome: Progressing       Patient is not progressing towards the following goals:    Problem: Pain - Standard  Goal: Alleviation of pain or a reduction in pain to the patient’s comfort goal  Outcome: Not Met

## 2024-08-31 NOTE — HOSPITAL COURSE
"\"64 y.o. female with history of ischemic heart disease, renal cell carcinoma hypertension thyroid disease and migraines who presented 8/30/2024 with worst headache of her life associated with photophobia.  She denies nausea or vomiting numbness tingling or weakness.  She had similar symptoms a couple of days ago but the symptoms resolved on their own.  She was transported to [facility] emergency department for evaluation.  On arrival her blood pressure is 154/74.  A CT head with and without contrast revealed a subarachnoid hemorrhage along the tentorium of the right posterior fossa.  A 5.5 mm \"aneurysm-like\" structure was noted in the right posterior fossa concerning for arterial or venous aneurysm.  Vascular neurology and neurocritical care consultations were requested for further evaluation and management.\"  "

## 2024-09-01 VITALS
HEIGHT: 66 IN | SYSTOLIC BLOOD PRESSURE: 125 MMHG | DIASTOLIC BLOOD PRESSURE: 60 MMHG | RESPIRATION RATE: 36 BRPM | WEIGHT: 136.91 LBS | TEMPERATURE: 98.2 F | BODY MASS INDEX: 22 KG/M2 | OXYGEN SATURATION: 96 % | HEART RATE: 70 BPM

## 2024-09-01 LAB
ANION GAP SERPL CALC-SCNC: 9 MMOL/L (ref 7–16)
BASOPHILS # BLD AUTO: 0.1 % (ref 0–1.8)
BASOPHILS # BLD: 0.01 K/UL (ref 0–0.12)
BUN SERPL-MCNC: 13 MG/DL (ref 8–22)
CALCIUM SERPL-MCNC: 8.6 MG/DL (ref 8.5–10.5)
CHLORIDE SERPL-SCNC: 106 MMOL/L (ref 96–112)
CO2 SERPL-SCNC: 24 MMOL/L (ref 20–33)
CREAT SERPL-MCNC: 0.59 MG/DL (ref 0.5–1.4)
EOSINOPHIL # BLD AUTO: 0 K/UL (ref 0–0.51)
EOSINOPHIL NFR BLD: 0 % (ref 0–6.9)
ERYTHROCYTE [DISTWIDTH] IN BLOOD BY AUTOMATED COUNT: 42.5 FL (ref 35.9–50)
GFR SERPLBLD CREATININE-BSD FMLA CKD-EPI: 99 ML/MIN/1.73 M 2
GLUCOSE BLD STRIP.AUTO-MCNC: 128 MG/DL (ref 65–99)
GLUCOSE BLD STRIP.AUTO-MCNC: 137 MG/DL (ref 65–99)
GLUCOSE BLD STRIP.AUTO-MCNC: 199 MG/DL (ref 65–99)
GLUCOSE SERPL-MCNC: 144 MG/DL (ref 65–99)
HCT VFR BLD AUTO: 35.6 % (ref 37–47)
HGB BLD-MCNC: 12.2 G/DL (ref 12–16)
IMM GRANULOCYTES # BLD AUTO: 0.11 K/UL (ref 0–0.11)
IMM GRANULOCYTES NFR BLD AUTO: 1 % (ref 0–0.9)
LYMPHOCYTES # BLD AUTO: 0.75 K/UL (ref 1–4.8)
LYMPHOCYTES NFR BLD: 6.9 % (ref 22–41)
MAGNESIUM SERPL-MCNC: 2.2 MG/DL (ref 1.5–2.5)
MCH RBC QN AUTO: 31.9 PG (ref 27–33)
MCHC RBC AUTO-ENTMCNC: 34.3 G/DL (ref 32.2–35.5)
MCV RBC AUTO: 93.2 FL (ref 81.4–97.8)
MONOCYTES # BLD AUTO: 0.4 K/UL (ref 0–0.85)
MONOCYTES NFR BLD AUTO: 3.7 % (ref 0–13.4)
NEUTROPHILS # BLD AUTO: 9.63 K/UL (ref 1.82–7.42)
NEUTROPHILS NFR BLD: 88.3 % (ref 44–72)
NRBC # BLD AUTO: 0 K/UL
NRBC BLD-RTO: 0 /100 WBC (ref 0–0.2)
PLATELET # BLD AUTO: 217 K/UL (ref 164–446)
PMV BLD AUTO: 10.7 FL (ref 9–12.9)
POTASSIUM SERPL-SCNC: 4.2 MMOL/L (ref 3.6–5.5)
RBC # BLD AUTO: 3.82 M/UL (ref 4.2–5.4)
SODIUM SERPL-SCNC: 138 MMOL/L (ref 135–145)
SODIUM SERPL-SCNC: 139 MMOL/L (ref 135–145)
WBC # BLD AUTO: 10.9 K/UL (ref 4.8–10.8)

## 2024-09-01 PROCEDURE — A9270 NON-COVERED ITEM OR SERVICE: HCPCS | Performed by: INTERNAL MEDICINE

## 2024-09-01 PROCEDURE — 99232 SBSQ HOSP IP/OBS MODERATE 35: CPT | Performed by: PSYCHIATRY & NEUROLOGY

## 2024-09-01 PROCEDURE — 97162 PT EVAL MOD COMPLEX 30 MIN: CPT

## 2024-09-01 PROCEDURE — 99239 HOSP IP/OBS DSCHRG MGMT >30: CPT | Performed by: NURSE PRACTITIONER

## 2024-09-01 PROCEDURE — 700102 HCHG RX REV CODE 250 W/ 637 OVERRIDE(OP): Performed by: INTERNAL MEDICINE

## 2024-09-01 PROCEDURE — 97535 SELF CARE MNGMENT TRAINING: CPT

## 2024-09-01 PROCEDURE — 82962 GLUCOSE BLOOD TEST: CPT | Mod: 91

## 2024-09-01 PROCEDURE — 84295 ASSAY OF SERUM SODIUM: CPT

## 2024-09-01 PROCEDURE — 80048 BASIC METABOLIC PNL TOTAL CA: CPT

## 2024-09-01 PROCEDURE — 85025 COMPLETE CBC W/AUTO DIFF WBC: CPT

## 2024-09-01 PROCEDURE — 83735 ASSAY OF MAGNESIUM: CPT

## 2024-09-01 PROCEDURE — 700111 HCHG RX REV CODE 636 W/ 250 OVERRIDE (IP): Mod: JZ | Performed by: INTERNAL MEDICINE

## 2024-09-01 RX ADMIN — ACETAMINOPHEN 1000 MG: 500 TABLET ORAL at 00:22

## 2024-09-01 RX ADMIN — GABAPENTIN 100 MG: 100 CAPSULE ORAL at 11:44

## 2024-09-01 RX ADMIN — GABAPENTIN 100 MG: 100 CAPSULE ORAL at 05:03

## 2024-09-01 RX ADMIN — DEXAMETHASONE SODIUM PHOSPHATE 4 MG: 4 INJECTION INTRA-ARTICULAR; INTRALESIONAL; INTRAMUSCULAR; INTRAVENOUS; SOFT TISSUE at 05:02

## 2024-09-01 RX ADMIN — ACETAMINOPHEN 1000 MG: 500 TABLET ORAL at 05:03

## 2024-09-01 RX ADMIN — DEXAMETHASONE SODIUM PHOSPHATE 4 MG: 4 INJECTION INTRA-ARTICULAR; INTRALESIONAL; INTRAMUSCULAR; INTRAVENOUS; SOFT TISSUE at 00:22

## 2024-09-01 ASSESSMENT — ENCOUNTER SYMPTOMS
VOMITING: 0
STRIDOR: 0
NAUSEA: 0
TINGLING: 0
SENSORY CHANGE: 0
SHORTNESS OF BREATH: 0
HEADACHES: 0
HEADACHES: 1
WEAKNESS: 0
PHOTOPHOBIA: 1
MYALGIAS: 0
ABDOMINAL PAIN: 0
DIZZINESS: 1
SPUTUM PRODUCTION: 0
SPEECH CHANGE: 0
PALPITATIONS: 0
PHOTOPHOBIA: 0
FEVER: 0
COUGH: 0
FOCAL WEAKNESS: 0
BLURRED VISION: 0
CHILLS: 0

## 2024-09-01 ASSESSMENT — COGNITIVE AND FUNCTIONAL STATUS - GENERAL
MOBILITY SCORE: 24
SUGGESTED CMS G CODE MODIFIER MOBILITY: CH

## 2024-09-01 ASSESSMENT — GAIT ASSESSMENTS
GAIT LEVEL OF ASSIST: STANDBY ASSIST
DEVIATION: BRADYKINETIC
DISTANCE (FEET): 500

## 2024-09-01 ASSESSMENT — PAIN DESCRIPTION - PAIN TYPE
TYPE: ACUTE PAIN

## 2024-09-01 NOTE — PROGRESS NOTES
Neurology Progress Note  Neurohospitalist Service, [facility]    Referring Physician: Dany Gutierrez Jr., D.O.    Chief Complaint   Patient presents with    Headache       HPI: Refer to initial documented Neurology H&P, as detailed in the patient's chart.    Interval History:   No acute events overnight, headache has resolved.  She feels good.   and son are at bedside.  She is status post embolization of dural AV fistula on 8/30/2024.  Brain MRI with no evidence of residual fistula.  Venous aneurysm is thrombosed.  There is minimal amount of subdural and subarachnoid hemorrhage noted on MRI.        Review of systems: In addition to what is detailed in the HPI and/or updated in the interval history, all other systems reviewed and are negative.    Past Medical History:    has a past medical history of Celiac disease (02/13/2015), Family history of ischemic heart disease (IHD), FH: renal cell carcinoma (02/13/2015), High cholesterol, Hyperlipidemia (02/13/2015), Hypertension, Migraine headache (02/13/2015), and Thyroid disease.    She has no past medical history of History of melanoma or Personal history of renal cancer.    FHx:  family history includes Arthritis in her mother; Cancer in her brother, father, paternal grandfather, and sister; Glasses in her child, child, child, father, and mother; Glaucoma in her brother; Heart Attack (age of onset: 65) in her maternal uncle; Heart Disease in her maternal grandmother and mother; Lung Disease in her father; Other in her brother, child, mother, and sister.    SHx:   reports that she quit smoking about 46 years ago. Her smoking use included cigarettes. She started smoking about 49 years ago. She has a 0.8 pack-year smoking history. She has never used smokeless tobacco. She reports current alcohol use of about 3.0 oz of alcohol per week. She reports that she does not use drugs.    Medications:    Current Facility-Administered Medications:      gabapentin (Neurontin) capsule 100 mg, 100 mg, Oral, TID, Dany Gutierrez Jr., D.O., 100 mg at 09/01/24 0503    Respiratory Therapy Consult, , Nebulization, Continuous RT, Dany De Oliveira M.D.    ondansetron (Zofran ODT) dispertab 4 mg, 4 mg, Oral, Q4HRS PRN **OR** ondansetron (Zofran) syringe/vial injection 4 mg, 4 mg, Intravenous, Q4HRS PRN, Dany De Oliveira M.D.    LORazepam (Ativan) injection 2 mg, 2 mg, Intravenous, Q5 MIN PRN, Dany De Oliveira M.D.    MD Alert...ICU Electrolyte Replacement per Pharmacy, , Other, PHARMACY TO DOSE, Dany De Oliveira M.D.    acetaminophen (Tylenol) tablet 1,000 mg, 1,000 mg, Oral, Q6HRS, Dany De Oliveira M.D., 1,000 mg at 09/01/24 0503    insulin regular (HumuLIN R,NovoLIN R) injection, 2-9 Units, Subcutaneous, 4X/DAY ACHS, 2 Units at 08/31/24 2047 **AND** POC blood glucose manual result, , , Q AC AND BEDTIME(S) **AND** NOTIFY MD and PharmD, , , Once **AND** Administer 20 grams of glucose (approximately 8 ounces of fruit juice) every 15 minutes PRN FSBG less than 70 mg/dL, , , PRN **AND** dextrose 50% (D50W) injection 25 g, 25 g, Intravenous, Q15 MIN PRN, Karlee Gutierres    fentaNYL (Sublimaze) injection 12.5-25 mcg, 12.5-25 mcg, Intravenous, Q HOUR PRN, Karlee L. Latona, 12.5 mcg at 08/31/24 1119    senna-docusate (Pericolace Or Senokot S) 8.6-50 MG per tablet 2 Tablet, 2 Tablet, Oral, Q EVENING, 2 Tablet at 08/30/24 8102 **AND** polyethylene glycol/lytes (Miralax) Packet 1 Packet, 1 Packet, Oral, QDAY PRN, Karlee LSelene Latona    Physical Examination:    Vitals:    09/01/24 0500 09/01/24 0600 09/01/24 0700 09/01/24 0800   BP: 130/65 120/58 108/56 (!) 140/66   Pulse: (!) 58 (!) 54 (!) 57 72   Resp:       Temp:    37 °C (98.6 °F)   TempSrc:    Temporal   SpO2: 92% 93%  96%   Weight:       Height:           General:   Patient is awake and in no acute distress  Neck: Full range of motion  Eyes: Midline, Pupils reactive to light.  CV: RRR  Lungs: No respiratory distress  Extremities: No  cyanosis, warm, no significant edema.    NEUROLOGICAL EXAM:   Mental status: Awake, alert and fully oriented, follows commands  Speech and language: speech is not dysarthric. The patient is able to name and repeat.  Cranial nerve exam: Pupils are equal, round and reactive to light bilaterally. Visual fields are full. Extraocular muscles are intact.   Face is symmetric, facial sensation is intact.   Motor exam: Sustain antigravity in all 4 extremities with no downward drift. Tone is normal. No abnormal movements were seen on exam.  Sensory exam: No sensory deficits identified   Coordination: no gross ataxia noted on exam  Plantar reflexes: Equivocal  Gait: deferred     Objective Data:    Labs:  Lab Results   Component Value Date/Time    PROTHROMBTM 13.7 08/30/2024 05:30 AM    INR 1.04 08/30/2024 05:30 AM      Lab Results   Component Value Date/Time    WBC 10.9 (H) 09/01/2024 05:10 AM    RBC 3.82 (L) 09/01/2024 05:10 AM    HEMOGLOBIN 12.2 09/01/2024 05:10 AM    HEMATOCRIT 35.6 (L) 09/01/2024 05:10 AM    MCV 93.2 09/01/2024 05:10 AM    MCH 31.9 09/01/2024 05:10 AM    MCHC 34.3 09/01/2024 05:10 AM    MPV 10.7 09/01/2024 05:10 AM    NEUTSPOLYS 88.30 (H) 09/01/2024 05:10 AM    LYMPHOCYTES 6.90 (L) 09/01/2024 05:10 AM    MONOCYTES 3.70 09/01/2024 05:10 AM    EOSINOPHILS 0.00 09/01/2024 05:10 AM    BASOPHILS 0.10 09/01/2024 05:10 AM      Lab Results   Component Value Date/Time    SODIUM 139 09/01/2024 05:10 AM    POTASSIUM 4.2 09/01/2024 05:10 AM    CHLORIDE 106 09/01/2024 05:10 AM    CO2 24 09/01/2024 05:10 AM    GLUCOSE 144 (H) 09/01/2024 05:10 AM    BUN 13 09/01/2024 05:10 AM    CREATININE 0.59 09/01/2024 05:10 AM    BUNCREATRAT 22 03/17/2022 09:00 PM      Lab Results   Component Value Date/Time    CHOLSTRLTOT 187 08/30/2024 05:30 AM    LDL 82 08/30/2024 05:30 AM    HDL 91 08/30/2024 05:30 AM    TRIGLYCERIDE 72 08/30/2024 05:30 AM       Lab Results   Component Value Date/Time    ALKPHOSPHAT 57 08/30/2024 05:30 AM     ASTSGOT 19 08/30/2024 05:30 AM    ALTSGPT 12 08/30/2024 05:30 AM    TBILIRUBIN 0.5 08/30/2024 05:30 AM        Imaging/Testing:    I interpreted and/or reviewed the patient's neuroimaging    MR-BRAIN-WITH & W/O   Final Result      1.  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 There is no MR evidence of residual fistula. 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 Prominent cerebellar veins are again seen which is the sequela of the    dural AV fistula in the expected to be resolved in future.   2.  Catheter angiogram is recommended after 3 months.   3.  Minimal subdural hemorrhage.   4.  Minimal subarachnoid hemorrhage.         CT-CTA HEAD WITH & W/O-POST PROCESS   Final Result         1.  There is aneurysm-like structure in the right posterior fossa near the midline measuring 5.5 mm, could represent arterial or venous aneurysm.   2.  Multiple tortuous vascular structures in the right posterior fossa compatible with vascular malformation   3.  No large vessel occlusion identified.   4.  Layering subarachnoid hemorrhage along the bilateral tentorium with subarachnoid hemorrhages in the right posterior fossa.      These findings were discussed with the patient's clinician, Nilson Chavez, on 8/30/2024 6:34 AM.      IR-EMBOLIZE-NEURO-INTRACRANIAL    (Results Pending)       Assessment and Plan:  Lily Sawant is a pleasant 64 y.o. right-handed female with history of hypertension, renal cell carcinoma, hyperlipidemia, migraine headache, thyroid disease and ischemic heart disease who presented with worst headache of her life associated with neck pain and photophobia.  She has no other neurological deficits such as numbness or weakness.  She was found to have a dural AV fistula for which she underwent endovascular embolization.  Neurologically remains intact except for some headaches.  Brain MRI with no evidence of residual fistula.  Venous aneurysm is thrombosed.  There  is minimal amount of subdural and subarachnoid hemorrhage noted on MRI.    Plan:  -Okay to DC home.  - Follow-up with neuro IR in 4 to 6 weeks.  - Discussed with patient and family at bedside.    The evaluation of the patient, and recommended management, was discussed with FLORENTIN Bernard Jr..O.    Please note that this dictation was created using voice recognition software. I have made every reasonable attempt to correct obvious errors, but I expect that there are errors of grammar and possibly content that I did not discover before finalizing the note.      Desirae Atkinson MD  [facility]ute [facility] Neurology Services

## 2024-09-01 NOTE — DISCHARGE INSTRUCTIONS
Activity    No Heavy Lifting     S/p Rupert embolization of dural AV fistula (08/30/24)  - Post JEANNE groin access site instructions: no lifting greater than 5 lbs and no baths/swimming/soaking in tub for 7-10 days. Shower OK. OK to change dressings/band aid as needed.  - Follow up appointment in 4 weeks with [facility] Neuro IR, our office to call and schedule  - Follow-up angiogram in 3 months which can also be arranged by our office      FOLLOW UP         Future Appointments   Date Time Provider Department Center   3/12/2025  8:40 AM [facility] [place]   6/16/2025  8:30 AM Daniel Tatum M.D. [facility] None   8/11/2025  7:40 AM Felix Campos D.O. [facility] Cele Arnold M.D.  [address] [facility] [id number] [phone]     Call today           MEDICATIONS ON DISCHARGE      Medication List          CHANGE how you take these medications         Instructions   atorvastatin 20 MG Tabs  What changed:   how much to take  how to take this  when to take this  Commonly known as: Lipitor    TAKE 1 TABLET BY MOUTH ONCE DAILY AT BEDTIME      estradiol 0.1 MG/GM vaginal cream  What changed:   how much to take  how to take this  when to take this  additional instructions  Commonly known as: Estrace    Insert 0.5-1g nightly x 1 week then twice weekly thereafter for maintenance therapy                CONTINUE taking these medications         Instructions   acetaminophen 500 MG Tabs  Commonly known as: Tylenol    Take 500-1,000 mg by mouth every 6 hours as needed. Indications: Pain  Dose: 500-1,000 mg      risedronate 35 MG Tabs  Commonly known as: Actonel    Take 1 Tablet by mouth every 7 days.  Dose: 35 mg      Sleep Aid 25 MG Tabs tablet  Generic drug: doxylamine    Take 12.5 mg by mouth at bedtime as needed (Sleep).  Dose: 12.5 mg      WOMENS DAILY FORMULA PO    Take 1 Tablet by mouth every day.  Dose: 1 Tablet                STOP taking these medications       aspirin EC 81 MG  "Tbec  Commonly known as: Ecotrin      ibuprofen 200 MG Tabs  Commonly known as: Motrin      Vitamin D 50 MCG (2000 UT) Caps      Hemorrhagic Stroke - Subarachnoid Hemorrhage  Discharge Instructions    You experienced a Subarachnoid Hemorrhage.  This kind of stroke is caused when a vessel bursts near the surface of the brain, causing blood to leak between the brain and skull. This blood may cause nearby arteries to spasm which can reduce blood flow to the brain and cause a stroke.     Stroke Risk Factors    You are at increased risk of having another stroke event.  See your Patient Stroke Guide to help reduce your stroke risk. These are your specific risk factors:  Age - Over 55     Get help right away if you have any signs of a stroke.  \"BE FAST\" is an easy way to remember the main warning signs of a stroke:  B - Balance. Dizziness, sudden trouble walking, or loss of balance.  E - Eyes. Trouble seeing or a change in how you see.  F - Face. Sudden weakness or loss of feeling in the face. The face or eyelid may droop on one side.  A - Arms. Weakness or loss of feeling in an arm. This happens all of a sudden and most often on one side of the body.  S - Speech. Sudden trouble speaking, slurred speech, or trouble understanding what people say.  T - Time. Time to call emergency services. Write down what time symptoms started.                      "

## 2024-09-01 NOTE — THERAPY
Physical Therapy   Initial Evaluation     Patient Name: Lily Sawant  Age:  64 y.o., Sex:  female  Medical Record #: [id number]  Today's Date: 9/1/2024     Precautions  Precautions: Other (See Comments)  Comments: -140    Assessment  Patient is 64 y.o. female presenting for headache d/t dural AV fistula now POD2 endovascular embolization w/ a PMH of celiac disease, renal cell carcinoma, HLD, HTN, migraines, thyroid disease and ischemic heart disease.  She lives w/ her  in a SS home, and reports being very active at baseline going on multiple hikes every week for up to 6hrs at a time (see flowsheet for home set-up and PLOF).    Currently, the pt displays no focal deficits in overall strength and mobility, and reports she is near-baseline independence w/ mobility tasks at this time.  She was able to demo supine<>sit EOB SPV w/ HOB flat and no rails, along w/ STS EOB w/ no AD SBA.  The pt completed gait 500' w/ no AD and SBA via slow fermin, no LOB observed and distance limited by fatigue.  She also displays ability to ascend/descend her stairs to enter her home as evident by ability to perform SLS >2s BLE w/ no UE support.  Provided pt edu re: progression towards higher level cardiovascular activity (hiking, swimming, etc.) w/ pt understanding verbalized.  Anticipate the pt will have no further PT needs upon DC given near-baseline independence demo'd w/ functional mobility tasks.  Will not follow, please re-consult should there be a change in the pt's condition.      Plan    Physical Therapy Initial Treatment Plan   Duration: Evaluation only    DC Equipment Recommendations: None  Discharge Recommendations: Anticipate that the patient will have no further physical therapy needs after discharge from the hospital       Subjective/Objective       09/01/24 0934   Prior Living Situation   Prior Services Home-Independent   Housing / Facility 1 Story House   Steps Into Home 2   Steps In Home 0   Equipment  Owned None   Lives with - Patient's Self Care Capacity Spouse   Comments Pt lives w/ her  (present throughout) who is able to assist her at home w/ ADL's/IADL's as needed.  She reports being very active, and goes on multiple hikes every week (up to 6hrs).   Prior Level of Functional Mobility   Bed Mobility Independent   Transfer Status Independent   Ambulation Independent   Ambulation Distance Community distances   Assistive Devices Used None   Stairs Independent   History of Falls   History of Falls No   Date of Last Fall   (pt denies any recent falls)   Cognition    Cognition / Consciousness WDL   Level of Consciousness Alert   Comments pt pleasant and motivated to participate   Passive ROM Lower Body   Passive ROM Lower Body WDL   Active ROM Lower Body    Active ROM Lower Body  WDL   Comments observed during functional mobility tasks   Strength Lower Body   Lower Body Strength  WDL   Comments as above   Sensation Lower Body   Lower Extremity Sensation   WDL   Comments pt denies numbness/tingling in her LE's   Coordination Lower Body    Coordination Lower Body  WDL   Vision   Vision Comments Occulomotor function WNL, no visual field deficits   Other Treatments   Other Treatments Provided Edu provided re: progression towards higher level cardiovascular activities (hiking, swimming, etc.)   Balance Assessment   Sitting Balance (Static) Fair +   Sitting Balance (Dynamic) Fair +   Standing Balance (Static) Fair +   Standing Balance (Dynamic) Fair +   Weight Shift Sitting Good   Weight Shift Standing Good   Comments stand w/ no AD   Bed Mobility    Supine to Sit Supervised   Sit to Supine Supervised   Scooting Supervised   Rolling Supervised   Comments HOB flat, no bed rails   Gait Analysis   Gait Level Of Assist Standby Assist   Assistive Device None   Distance (Feet) 500   # of Times Distance was Traveled 1   Deviation Bradykinetic   Weight Bearing Status no restrictions   Vision Deficits Impacting Mobility pt  denies   Comments distance limited by fatigue   Functional Mobility   Sit to Stand Standby Assist   Bed, Chair, Wheelchair Transfer Standby Assist   Transfer Method Stand Step   Mobility STS EOB w/ no AD; EOB<>hallway w/ no AD   Activity Tolerance   Sitting Edge of Bed 6min   Standing 10min   Education Group   Education Provided Role of Physical Therapist   Role of Physical Therapist Patient Response Patient;Family;Significant Other;Acceptance;Explanation;Demonstration;Action Demonstration   Additional Comments pt and pt's  both receptive of [facility] provided   Physical Therapy Initial Treatment Plan    Duration Evaluation only   Problem List    Problems Decreased Activity Tolerance   Anticipated Discharge Equipment and Recommendations   DC Equipment Recommendations None   Discharge Recommendations Anticipate that the patient will have no further physical therapy needs after discharge from the hospital   Interdisciplinary Plan of Care Collaboration   IDT Collaboration with  Nursing;Family / Caregiver   Patient Position at End of Therapy In Bed;Call Light within Reach;Tray Table within Reach;Phone within Reach;Family / Friend in Room   Collaboration Comments regarding outcome of tx session   Session Information   Date / Session Number  9/1- eval only

## 2024-09-01 NOTE — PROGRESS NOTES
Neurosurgery Progress Note    Subjective:  No acute events overnight. S/p endovascular embolization of dural AV fistula 2 days ago.    Exam:  Awake, alert   Speech fluent appropriate  In no apparent distress  Affect, mood appropriate  Pupils 3 mm midline, reactive.  Conjugate gaze.  Face symmetric  Facial sensation intact light touch  Hearing intact to conversation bilaterally  Motor:  Bilateral bicep, tricep, IP, dorsiflexion,  5/5 no pronator drift  Sensation:  Intact touch face, neck, torso, four extremities  Reflex:  No Mcfadden's   Right groin puncture cdi with dressing    Recent Labs     08/30/24  0530 08/31/24  0512 09/01/24  0510   WBC 5.4 6.2 10.9*   RBC 3.94* 3.67* 3.82*   HEMOGLOBIN 12.6 11.9* 12.2   HEMATOCRIT 37.0 34.2* 35.6*   MCV 93.9 93.2 93.2   MCH 32.0 32.4 31.9   MCHC 34.1 34.8 34.3   RDW 41.6 42.4 42.5   PLATELETCT 176 173 217   MPV 10.4 10.6 10.7     Recent Labs     08/30/24  0530 08/30/24  0840 08/31/24  0512 08/31/24  1218 08/31/24  1835 09/01/24  0030 09/01/24  0510   SODIUM 134*   < > 137   < > 139 138 139   POTASSIUM 3.5*  --  3.7  --   --   --  4.2   CHLORIDE 100  --  106  --   --   --  106   CO2 21  --  20  --   --   --  24   GLUCOSE 97  --  152*  --   --   --  144*   BUN 7*  --  9  --   --   --  13   CREATININE 0.71  --  0.53  --   --   --  0.59   CALCIUM 8.7  --  8.3*  --   --   --  8.6    < > = values in this interval not displayed.     Recent Labs     08/30/24  0530   APTT 26.9   INR 1.04     Recent Labs     08/30/24 0840   REACTMIN 5.0   CLOTKINET 1.1   CLOTANGL 75.1   MAXCLOTS 60.2   PRCINADP 6.6   PRCINAA 3.9       Intake/Output                         08/31/24 0700 - 09/01/24 0659 09/01/24 0700 - 09/02/24 0659     0700-1859 1900-0659 Total 0700-1859 1900-0659 Total                 Intake    P.O.  720  480 1200  240  -- 240    P.O.  240 -- 240    I.V.  606.3  0 606.3  --  -- --    Volume (mL) (NS infusion) 606.3 0 606.3 -- -- --    Other  90  -- 90  --  -- --     Medications (PO/Enteral Liquids) 90 -- 90 -- -- --    Total Intake 1416.3 480 1896.3 240 -- 240       Output    Urine  2300  1300 3600  --  -- --    Number of Times Voided 2 x 2 x 4 x -- -- --    Urine Void (mL) 2300 1300 3600 -- -- --    Stool  --  -- --  --  -- --    Number of Times Stooled 0 x 1 x 1 x 0 x -- 0 x    Total Output 2300 1300 3600 -- -- --       Net I/O     -883.7 -820 -1703.7 240 -- 240          Assessment and Plan:  \ospital day # 3  S/p endovascular embolization of ruptured dural AV fistula 8/30     Chemical prophylactic DVT therapy: Yes - Lovenox 40mg/qd    Start date/time: when cleared from IR    Doing well  Ok to discharge home from NSGY standpoint    Updated family at bedside. Discussed with Dr. Gutierrez.    Please call with questions.    Alcon Diaz M.D.     My total time spent caring for the patient on the day of the encounter was 35 minutes for ICU care.  This does not include time spent on separately billable procedures/tests.

## 2024-09-01 NOTE — DISCHARGE SUMMARY
"Discharge Summary    CHIEF COMPLAINT ON ADMISSION  Chief Complaint   Patient presents with    Headache       Reason for Admission  Headache     Admission Date  8/30/2024    CODE STATUS  Full Code    HPI & HOSPITAL COURSE  \"64 y.o. female with history of ischemic heart disease, renal cell carcinoma hypertension thyroid disease and migraines who presented 8/30/2024 with worst headache of her life associated with photophobia.  She denies nausea or vomiting numbness tingling or weakness.  She had similar symptoms a couple of days ago but the symptoms resolved on their own.  She was transported to [facility] emergency department for evaluation.  On arrival her blood pressure is 154/74.  A CT head with and without contrast revealed a subarachnoid hemorrhage along the tentorium of the right posterior fossa.  A 5.5 mm \"aneurysm-like\" structure was noted in the right posterior fossa concerning for arterial or venous aneurysm.  Vascular neurology and neurocritical care consultations were requested for further evaluation and management.\"    8/31: MRI looks great, discussed [facility] Neuro, Neurosx and JEANNE plan for D/C 9/1    No acute overnight events. Dizziness, headaches and photophobia are improving/stable. Patient is tolerating a diet and ambulating without any difficulties.     Therefore, she is discharged in good and stable condition to home with close outpatient follow-up.    The patient met 2-midnight criteria for an inpatient stay at the time of discharge.    Discharge Date  09/01/24    FOLLOW UP ITEMS POST DISCHARGE  -FU with IR, Dr. Ovalle  -NO ASA or NSAID's until cleared by Dr. Ovalle  -Return to the nearest ED or call 911 for worsening symptoms.    DISCHARGE DIAGNOSES  Principal Problem:    SAH (subarachnoid hemorrhage) (HCC) (POA: Yes)  Active Problems:    Migraine with aura and without status migrainosus, not intractable (POA: Yes)      Overview: Patient has flashing lights prior to her migraines. She " reports her       migraines have improved greatly over the past years. She does not take any       specific medications at this time. Her migraines may be associated to       pressure changes in the weather  Resolved Problems:    * No resolved hospital problems. *      FOLLOW UP  Future Appointments   Date Time Provider Department Center   3/12/2025  8:40 AM [facility] [place]   6/16/2025  8:30 AM Daniel Tatum M.D. [facility] None   8/11/2025  7:40 AM Felix Campos D.O. [facility] Cele Arnold M.D.  [address] [facility] [id number] [phone]    Call today        MEDICATIONS ON DISCHARGE     Medication List        CHANGE how you take these medications        Instructions   atorvastatin 20 MG Tabs  What changed:   how much to take  how to take this  when to take this  Commonly known as: Lipitor   TAKE 1 TABLET BY MOUTH ONCE DAILY AT BEDTIME     estradiol 0.1 MG/GM vaginal cream  What changed:   how much to take  how to take this  when to take this  additional instructions  Commonly known as: Estrace   Insert 0.5-1g nightly x 1 week then twice weekly thereafter for maintenance therapy            CONTINUE taking these medications        Instructions   acetaminophen 500 MG Tabs  Commonly known as: Tylenol   Take 500-1,000 mg by mouth every 6 hours as needed. Indications: Pain  Dose: 500-1,000 mg     risedronate 35 MG Tabs  Commonly known as: Actonel   Take 1 Tablet by mouth every 7 days.  Dose: 35 mg     Sleep Aid 25 MG Tabs tablet  Generic drug: doxylamine   Take 12.5 mg by mouth at bedtime as needed (Sleep).  Dose: 12.5 mg     WOMENS DAILY FORMULA PO   Take 1 Tablet by mouth every day.  Dose: 1 Tablet            STOP taking these medications      aspirin EC 81 MG Tbec  Commonly known as: Ecotrin     ibuprofen 200 MG Tabs  Commonly known as: Motrin     Vitamin D 50 MCG (2000 UT) Caps              Allergies  Allergies   Allergen Reactions    Gluten Meal  Diarrhea     Gluten intolerance     Pineapple Diarrhea     Diarrhea        DIET  Orders Placed This Encounter   Procedures    Diet Order Diet: Regular (gluten and pineapple allergy, lactose sensative); Miscellaneous modifications: (optional): Lactose Free     Standing Status:   Standing     Number of Occurrences:   1     Order Specific Question:   Diet:     Answer:   Regular [1]     Comments:   gluten and pineapple allergy, lactose sensative     Order Specific Question:   Miscellaneous modifications: (optional)     Answer:   Lactose Free [5]       ACTIVITY  As tolerated.  Weight bearing as tolerated    CONSULTATIONS  Interventional Radiology  Neurosurgery   Critical Care    PROCEDURES  8/30 - [place] Embolization of dAVF    LABORATORY  Lab Results   Component Value Date    SODIUM 139 09/01/2024    POTASSIUM 4.2 09/01/2024    CHLORIDE 106 09/01/2024    CO2 24 09/01/2024    GLUCOSE 144 (H) 09/01/2024    BUN 13 09/01/2024    CREATININE 0.59 09/01/2024        Lab Results   Component Value Date    WBC 10.9 (H) 09/01/2024    HEMOGLOBIN 12.2 09/01/2024    HEMATOCRIT 35.6 (L) 09/01/2024    PLATELETCT 217 09/01/2024        Total time of the discharge process exceeds 40 minutes.    Please note that this dictation was created using voice recognition software. I have made every reasonable attempt to correct obvious errors, but there may be errors of grammar and possibly content that I did not discover before finalizing the note.    YVES Vences.

## 2024-09-01 NOTE — CARE PLAN
The patient is Stable - Low risk of patient condition declining or worsening    Shift Goals  Clinical Goals: q2 neuro's;SBP <140  Patient Goals: Rest; prepare for home  Family Goals: not present    Progress made toward(s) clinical / shift goals:    Problem: Pain - Standard  Goal: Alleviation of pain or a reduction in pain to the patient’s comfort goal  Outcome: Met     Problem: Knowledge Deficit - Standard  Goal: Patient and family/care givers will demonstrate understanding of plan of care, disease process/condition, diagnostic tests and medications  Outcome: Met     Problem: Neuro Status  Goal: Neuro status will remain stable or improve  Outcome: Met       Patient is not progressing towards the following goals:

## 2024-09-01 NOTE — CARE PLAN
The patient is Stable - Low risk of patient condition declining or worsening    Shift Goals  Clinical Goals: Q4 neuro over night  Patient Goals: sleep  Family Goals: rest    Progress made toward(s) clinical / shift goals:    Problem: Pain - Standard  Goal: Alleviation of pain or a reduction in pain to the patient’s comfort goal  Outcome: Progressing     Problem: Knowledge Deficit - Standard  Goal: Patient and family/care givers will demonstrate understanding of plan of care, disease process/condition, diagnostic tests and medications  Outcome: Progressing     Problem: Neuro Status  Goal: Neuro status will remain stable or improve  Outcome: Progressing       Patient is not progressing towards the following goals: N/A

## 2024-09-01 NOTE — PROGRESS NOTES
Neuro Radiology Progress Note   Author: BRIANNE Hagen Date & Time created: 9/1/2024  10:24 AM   Date of admission  8/30/2024  Note to reader: this note follows the APSO format rather than the historical SOAP format. Assessment and plan located at the top of the note for ease of use.    Chief Complaint  64 y.o. female admitted 8/30/2024 with   Chief Complaint   Patient presents with    Headache         HPI  64-year-old female with past medical history of HTN, HLD, migraines, celiac disease presented 08/30/2024 for sudden onset severe headache with photophobia.  She had similar symptoms a couple days prior to coming to the ER while swimming in [facility] but symptoms resolved on its own.  CTA head showed subarachnoid hemorrhage along bilateral tentorium and right posterior fossa, 5.5 mm aneurysm-like structure in the right posterior fossa, and right posterior fossa vascular structures compatible with vascular malformation.  Neuro IR was consulted and patient underwent a cerebral angiogram with successful [place] embolization of dural AV fistula with JEANNE Arnold on 08/30/2024.     Interval History:   08/31/24 -no acute events overnight.  Neuro intact.  Patient reports headache with photophobia; improved since admission and more in line with her usual migraine.  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 Pt discussed with neurology.    09/01/24 - No acute events overnight. Neuro intact. Headache resolved. Pt feels comfortable going home.     Assessment/Plan     Principal Problem:    SAH (subarachnoid hemorrhage) (HCC)  Active Problems:    Migraine with aura and without status migrainosus, not intractable      Plan IR  - S/p [place] embolization of dural AV fistula (08/30/24)  - Post JEANNE groin access site instructions: no lifting greater than 5 lbs and no baths/swimming/soaking  in tub for 7-10 days. Shower OK. OK to change dressings/band aid as needed.  - Follow up appointment in 4 weeks with OP Neuro IR, our office to call and schedule  - Follow-up angiogram in 3 months which can also be arranged by our office  - Neuro Checks per ICU policy  - From a Neuro Interventional Service standpoint, OK to discharge when medically cleared.    -  -Thank you for allowing Interventional Radiology team to participate in the patients care, if any additional care or requests are needed in the future please do not hesitate to call or place IR order           Review of Systems  Physical Exam   Review of Systems   Constitutional:  Negative for chills and fever.   Eyes:  Negative for photophobia.   Respiratory:  Negative for shortness of breath.    Cardiovascular:  Negative for chest pain and palpitations.   Gastrointestinal:  Negative for nausea and vomiting.   Neurological:  Negative for tingling, sensory change, speech change, weakness and headaches.      Vitals:    09/01/24 1000   BP: 125/60   Pulse: 70   Resp:    Temp: 36.8 °C (98.2 °F)   SpO2: 96%        Physical Exam  Constitutional:       Appearance: Normal appearance.   Cardiovascular:      Rate and Rhythm: Normal rate.   Pulmonary:      Effort: Pulmonary effort is normal. No respiratory distress.   Abdominal:      General: There is no distension.   Skin:     General: Skin is warm and dry.      Comments: Right groin access site soft, non-tender, without ecchymosis; dressing cdi.     Neurological:      Mental Status: She is alert.      Comments: A/O x 4  PEARLA  Face symmetrical  Speech fluent  Shoulder shrug intact  Antigravity with no downward drift in all extremities  5/5 strength in all extremities  Sensation intact    Psychiatric:         Mood and Affect: Mood normal.         Behavior: Behavior normal.             Labs    Recent Labs     08/30/24  0530 08/31/24  0512 09/01/24  0510   WBC 5.4 6.2 10.9*   RBC 3.94* 3.67* 3.82*   HEMOGLOBIN 12.6  11.9* 12.2   HEMATOCRIT 37.0 34.2* 35.6*   MCV 93.9 93.2 93.2   MCH 32.0 32.4 31.9   MCHC 34.1 34.8 34.3   RDW 41.6 42.4 42.5   PLATELETCT 176 173 217   MPV 10.4 10.6 10.7     Recent Labs     08/30/24  0530 08/30/24  0840 08/31/24  0512 08/31/24  1218 08/31/24  1835 09/01/24  0030 09/01/24  0510   SODIUM 134*   < > 137   < > 139 138 139   POTASSIUM 3.5*  --  3.7  --   --   --  4.2   CHLORIDE 100  --  106  --   --   --  106   CO2 21  --  20  --   --   --  24   GLUCOSE 97  --  152*  --   --   --  144*   BUN 7*  --  9  --   --   --  13   CREATININE 0.71  --  0.53  --   --   --  0.59   CALCIUM 8.7  --  8.3*  --   --   --  8.6    < > = values in this interval not displayed.     Recent Labs     08/30/24  0530 08/31/24  0512 09/01/24  0510   ALBUMIN 4.1  --   --    TBILIRUBIN 0.5  --   --    ALKPHOSPHAT 57  --   --    TOTPROTEIN 6.5  --   --    ALTSGPT 12  --   --    ASTSGOT 19  --   --    CREATININE 0.71 0.53 0.59     MR-BRAIN-WITH & W/O   Final Result      1.  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 There is no MR evidence of residual fistula. 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 Prominent cerebellar veins are again seen which is the sequela of the    dural AV fistula in the expected to be resolved in future.   2.  Catheter angiogram is recommended after 3 months.   3.  Minimal subdural hemorrhage.   4.  Minimal subarachnoid hemorrhage.         CT-CTA HEAD WITH & W/O-POST PROCESS   Final Result         1.  There is aneurysm-like structure in the right posterior fossa near the midline measuring 5.5 mm, could represent arterial or venous aneurysm.   2.  Multiple tortuous vascular structures in the right posterior fossa compatible with vascular malformation   3.  No large vessel occlusion identified.   4.  Layering subarachnoid hemorrhage along the bilateral tentorium with subarachnoid hemorrhages in the right posterior fossa.      These findings were discussed with the  "patient's clinician, Nilson Chavez, on 8/30/2024 6:34 AM.      IR-EMBOLIZE-NEURO-INTRACRANIAL    (Results Pending)     INR   Date Value Ref Range Status   08/30/2024 1.04 0.87 - 1.13 Final     Comment:     INR - Non-therapeutic Reference Range: 0.87-1.13  INR - Therapeutic Reference Range: 2.0-4.0       No results found for: \"POCINR\"     Intake/Output Summary (Last 24 hours) at 8/31/2024 0817  Last data filed at 8/31/2024 0600  Gross per 24 hour   Intake 3581.63 ml   Output 1600 ml   Net 1981.63 ml      I have personally reviewed the above labs and imaging      I have performed a physical exam and reviewed and updated ROS and Plan today (9/1/2024).     35 minutes in directly providing and coordinating care and extensive data review.  No time overlap and excludes procedures.  "

## 2024-09-01 NOTE — PROGRESS NOTES
"Critical Care Progress Note    Date of admission  8/30/2024    Chief Complaint  64 y.o. female admitted 8/30/2024 with SAH    Hospital Course  \"64 y.o. female with history of ischemic heart disease, renal cell carcinoma hypertension thyroid disease and migraines who presented 8/30/2024 with worst headache of her life associated with photophobia.  She denies nausea or vomiting numbness tingling or weakness.  She had similar symptoms a couple of days ago but the symptoms resolved on their own.  She was transported to [facility] emergency department for evaluation.  On arrival her blood pressure is 154/74.  A CT head with and without contrast revealed a subarachnoid hemorrhage along the tentorium of the right posterior fossa.  A 5.5 mm \"aneurysm-like\" structure was noted in the right posterior fossa concerning for arterial or venous aneurysm.  Vascular neurology and neurocritical care consultations were requested for further evaluation and management.\"    3/31: MRI looks great, discussed [facility], Neuro[facility] and JEANNE plan for D/C 9/1    Interval Problem Update  Reviewed last 24 hour events:   - NAEON   - Neuro: AOx4, non-focal   - HR: 50s-70s   - SBP: 110s-140s   - GI: tolerating diet, last BM this AM   - UOP: 3.6 L/24 hrs, Net +277 mL   - Petersen: no   - Tm: 36.9   - Lines: PIV   - PPx: GI not indicated, DVT contraindicated   - RA   - CXR (personally reviewed and compared to prior): no new   -Discussed with neurology, neurosurgery.  Stable for discharge   -Patient was cleared for discharge from a PT standpoint as well   -Discussed headache management with Tylenol, avoiding NSAIDs until cleared by Dr. Ovalle    Yesterday   - dAVF embolized yesterday   - Neuro: AOx4   - HR: 50s-70s   - SBP: 100s-130s on nicardipine   - GI: tolerating diet, last BM PTA   - UOP: 1.6 L/24 hrs   - Petersen: no   - Tm: 37.2   - Lines: PIV, art   - PPx: GI not indicated, DVT contraindicated   - RA   - CXR (personally reviewed and " compared to prior): no new   - Liberalize ambulation and neuro checks. Plan for D/C tomorrow    Review of Systems  Review of Systems   Unable to perform ROS: Critical illness   Constitutional:  Negative for chills and fever.   Eyes:  Positive for photophobia. Negative for blurred vision.   Respiratory:  Negative for cough, sputum production, shortness of breath and stridor.    Cardiovascular:  Negative for chest pain.   Gastrointestinal:  Negative for abdominal pain, nausea and vomiting.   Genitourinary:  Negative for dysuria.   Musculoskeletal:  Negative for myalgias.   Skin:  Negative for rash.   Neurological:  Positive for dizziness and headaches. Negative for sensory change and focal weakness.        Vital Signs for last 24 hours   Temp:  [36.2 °C (97.2 °F)-36.9 °C (98.4 °F)] 36.2 °C (97.2 °F)  Pulse:  [54-73] 54  Resp:  [8-41] 36  BP: (104-155)/(53-72) 120/58  SpO2:  [91 %-95 %] 93 %    Hemodynamic parameters for last 24 hours       Respiratory Information for the last 24 hours       Physical Exam   Physical Exam  Vitals and nursing note reviewed.   Constitutional:       Appearance: Normal appearance. She is not ill-appearing.      Comments: Sunglasses off, eating breakfast without difficulty.  In great spirits today   HENT:      Head: Normocephalic and atraumatic.      Right Ear: External ear normal.      Left Ear: External ear normal.      Nose: Nose normal.      Mouth/Throat:      Mouth: Mucous membranes are moist.      Pharynx: Oropharynx is clear.   Eyes:      Extraocular Movements: Extraocular movements intact.      Conjunctiva/sclera: Conjunctivae normal.      Pupils: Pupils are equal, round, and reactive to light.   Cardiovascular:      Rate and Rhythm: Normal rate and regular rhythm.      Pulses: Normal pulses.   Pulmonary:      Effort: Pulmonary effort is normal.      Breath sounds: Normal breath sounds.   Abdominal:      General: Bowel sounds are normal.      Palpations: Abdomen is soft.    Musculoskeletal:         General: Normal range of motion.      Cervical back: Neck supple.   Skin:     General: Skin is warm and dry.      Capillary Refill: Capillary refill takes less than 2 seconds.   Neurological:      General: No focal deficit present.      Mental Status: She is alert and oriented to person, place, and time. Mental status is at baseline.      Sensory: No sensory deficit.      Motor: No weakness.   Psychiatric:         Mood and Affect: Mood normal.         Behavior: Behavior normal.         Medications  Current Facility-Administered Medications   Medication Dose Route Frequency Provider Last Rate Last Admin    gabapentin (Neurontin) capsule 100 mg  100 mg Oral TID Dany Gutierrez Jr., D.O.   100 mg at 09/01/24 0503    Respiratory Therapy Consult   Nebulization Continuous RT Dany De Oliveira M.D.        ondansetron (Zofran ODT) dispertab 4 mg  4 mg Oral Q4HRS PRN Dany De Oliveira M.D.        Or    ondansetron (Zofran) syringe/vial injection 4 mg  4 mg Intravenous Q4HRS PRN Dany De Oliveira M.D.        LORazepam (Ativan) injection 2 mg  2 mg Intravenous Q5 MIN PRN Dany De Oliveira M.D. MD Alert...ICU Electrolyte Replacement per Pharmacy   Other PHARMACY TO DOSE Dany De Oliveira M.D.        dexamethasone (Decadron) injection 4 mg  4 mg Intravenous Q6HRS Dany De Oliveira M.D.   4 mg at 09/01/24 0502    acetaminophen (Tylenol) tablet 1,000 mg  1,000 mg Oral Q6HRS Dany De Oliveira M.D.   1,000 mg at 09/01/24 0503    insulin regular (HumuLIN R,NovoLIN R) injection  2-9 Units Subcutaneous 4X/DAY ACHS Karlee L. Latona   2 Units at 08/31/24 2047    And    dextrose 50% (D50W) injection 25 g  25 g Intravenous Q15 MIN PRN Karlee LSelene Latkeanu        fentaNYL (Sublimaze) injection 12.5-25 mcg  12.5-25 mcg Intravenous Q HOUR PRN Karlee L. Latona   12.5 mcg at 08/31/24 1119    senna-docusate (Pericolace Or Senokot S) 8.6-50 MG per tablet 2 Tablet  2 Tablet Oral Q EVENING Karlee L. Latona   2 Tablet at 08/30/24 2672     And    polyethylene glycol/lytes (Miralax) Packet 1 Packet  1 Packet Oral QDAY YAZAN Karlee POWELLSelene Gutierres           Fluids    Intake/Output Summary (Last 24 hours) at 9/1/2024 0656  Last data filed at 9/1/2024 0000  Gross per 24 hour   Intake 1656.27 ml   Output 2300 ml   Net -643.73 ml       Laboratory          Recent Labs     08/30/24  0530 08/30/24  0840 08/31/24  0512 08/31/24  1218 08/31/24  1835 09/01/24  0030 09/01/24  0510   SODIUM 134*   < > 137   < > 139 138 139   POTASSIUM 3.5*  --  3.7  --   --   --  4.2   CHLORIDE 100  --  106  --   --   --  106   CO2 21  --  20  --   --   --  24   BUN 7*  --  9  --   --   --  13   CREATININE 0.71  --  0.53  --   --   --  0.59   MAGNESIUM  --   --  2.2  --   --   --  2.2   CALCIUM 8.7  --  8.3*  --   --   --  8.6    < > = values in this interval not displayed.     Recent Labs     08/30/24 0530 08/31/24  0512 09/01/24  0510   ALTSGPT 12  --   --    ASTSGOT 19  --   --    ALKPHOSPHAT 57  --   --    TBILIRUBIN 0.5  --   --    GLUCOSE 97 152* 144*     Recent Labs     08/30/24  0530 08/31/24  0512 09/01/24  0510   WBC 5.4 6.2 10.9*   NEUTSPOLYS 66.00 85.90* 88.30*   LYMPHOCYTES 23.90 10.60* 6.90*   MONOCYTES 7.80 3.20 3.70   EOSINOPHILS 1.30 0.00 0.00   BASOPHILS 0.40 0.00 0.10   ASTSGOT 19  --   --    ALTSGPT 12  --   --    ALKPHOSPHAT 57  --   --    TBILIRUBIN 0.5  --   --      Recent Labs     08/30/24  0530 08/31/24  0512 09/01/24  0510   RBC 3.94* 3.67* 3.82*   HEMOGLOBIN 12.6 11.9* 12.2   HEMATOCRIT 37.0 34.2* 35.6*   PLATELETCT 176 173 217   PROTHROMBTM 13.7  --   --    APTT 26.9  --   --    INR 1.04  --   --        Imaging  CT:    Reviewed    Assessment/Plan  * SAH (subarachnoid hemorrhage) (HCC)- (present on admission)  Assessment & Plan  Subarachnoid hemorrhage etiology: dural AV fistula  S/P dAVF embolization on 8/30  Strict blood pressure control, goal SBP less than 140 mmHg.  Serial neurologic exams every 2 hrs while awake q4 at night  Neuro IR, neuro and Neurosurgery  following  Acute pain management.  Plan for discharge this afternoon if doing well with PT  Close follow-up with Dr. Ovalle    Migraine with aura and without status migrainosus, not intractable- (present on admission)  Assessment & Plan  Multimodal pain management  APAP, stop decadron         VTE:  Contraindicated  Ulcer: Not Indicated  Lines: None    I have performed a physical exam and reviewed and updated ROS and Plan today (9/1/2024). In review of yesterday's note (8/31/2024), there are no changes except as documented above.     Discussed patient condition and risk of morbidity and/or mortality with Family, RN, RT, Pharmacy, Charge nurse / hot rounds, Patient, and neurology and neurosurgery    Please note that this dictation was created using voice recognition software. The accuracy of the dictation is limited to the abilities of the software. I have made every reasonable attempt to correct obvious errors, but I expect that there are errors of grammar and possibly content that I did not discover before finalizing the note.

## 2024-09-10 ENCOUNTER — TELEPHONE (OUTPATIENT)
Dept: NEUROLOGY | Facility: MEDICAL CENTER | Age: 64
End: 2024-09-10
Payer: COMMERCIAL

## 2024-09-11 ENCOUNTER — TELEPHONE (OUTPATIENT)
Dept: NEUROLOGY | Facility: MEDICAL CENTER | Age: 64
End: 2024-09-11
Payer: COMMERCIAL

## 2024-09-17 ENCOUNTER — TELEPHONE (OUTPATIENT)
Dept: HEALTH INFORMATION MANAGEMENT | Facility: OTHER | Age: 64
End: 2024-09-17
Payer: COMMERCIAL

## 2024-10-01 DIAGNOSIS — Z23 NEED FOR PROPHYLACTIC VACCINATION WITH TYPHOID-PARATYPHOID ALONE (TAB): ICD-10-CM

## 2024-10-03 ENCOUNTER — HOSPITAL ENCOUNTER (OUTPATIENT)
Dept: RADIOLOGY | Facility: MEDICAL CENTER | Age: 64
End: 2024-10-03
Attending: NURSE PRACTITIONER
Payer: COMMERCIAL

## 2024-10-03 ENCOUNTER — HOSPITAL ENCOUNTER (OUTPATIENT)
Facility: MEDICAL CENTER | Age: 64
End: 2024-10-03
Attending: RADIOLOGY | Admitting: RADIOLOGY
Payer: COMMERCIAL

## 2024-10-03 DIAGNOSIS — I67.1 CEREBROVASCULAR MALFORMATION, DURAL AV FISTULA: ICD-10-CM

## 2024-10-03 ASSESSMENT — ENCOUNTER SYMPTOMS
CONSTITUTIONAL NEGATIVE: 1
CHILLS: 0
SPEECH CHANGE: 0
SENSORY CHANGE: 0
HEADACHES: 1
CARDIOVASCULAR NEGATIVE: 1
DIAPHORESIS: 0
WEAKNESS: 0
RESPIRATORY NEGATIVE: 1
FOCAL WEAKNESS: 0
WEIGHT LOSS: 0
FEVER: 0
GASTROINTESTINAL NEGATIVE: 1

## 2024-10-03 ASSESSMENT — LIFESTYLE VARIABLES: SUBSTANCE_ABUSE: 0

## 2024-10-07 ENCOUNTER — PRE-ADMISSION TESTING (OUTPATIENT)
Dept: ADMISSIONS | Facility: MEDICAL CENTER | Age: 64
End: 2024-10-07
Attending: RADIOLOGY
Payer: COMMERCIAL

## 2024-10-08 ENCOUNTER — PRE-ADMISSION TESTING (OUTPATIENT)
Dept: ADMISSIONS | Facility: MEDICAL CENTER | Age: 64
End: 2024-10-08
Attending: RADIOLOGY
Payer: COMMERCIAL

## 2024-10-08 DIAGNOSIS — Z01.812 PRE-OPERATIVE LABORATORY EXAMINATION: ICD-10-CM

## 2024-10-08 LAB
CREAT SERPL-MCNC: 0.8 MG/DL (ref 0.5–1.4)
ERYTHROCYTE [DISTWIDTH] IN BLOOD BY AUTOMATED COUNT: 42.1 FL (ref 35.9–50)
GFR SERPLBLD CREATININE-BSD FMLA CKD-EPI: 81 ML/MIN/1.73 M 2
HCT VFR BLD AUTO: 42.6 % (ref 37–47)
HGB BLD-MCNC: 14.2 G/DL (ref 12–16)
INR PPP: 0.94 (ref 0.87–1.13)
MCH RBC QN AUTO: 31.6 PG (ref 27–33)
MCHC RBC AUTO-ENTMCNC: 33.3 G/DL (ref 32.2–35.5)
MCV RBC AUTO: 94.9 FL (ref 81.4–97.8)
PLATELET # BLD AUTO: 226 K/UL (ref 164–446)
PMV BLD AUTO: 10.1 FL (ref 9–12.9)
PROTHROMBIN TIME: 12.6 SEC (ref 12–14.6)
RBC # BLD AUTO: 4.49 M/UL (ref 4.2–5.4)
WBC # BLD AUTO: 5.3 K/UL (ref 4.8–10.8)

## 2024-10-08 PROCEDURE — 85027 COMPLETE CBC AUTOMATED: CPT

## 2024-10-08 PROCEDURE — 36415 COLL VENOUS BLD VENIPUNCTURE: CPT

## 2024-10-08 PROCEDURE — 82565 ASSAY OF CREATININE: CPT

## 2024-10-08 PROCEDURE — 85610 PROTHROMBIN TIME: CPT

## 2024-10-14 ENCOUNTER — APPOINTMENT (OUTPATIENT)
Dept: RADIOLOGY | Facility: MEDICAL CENTER | Age: 64
End: 2024-10-14
Attending: RADIOLOGY
Payer: COMMERCIAL

## 2024-10-14 ENCOUNTER — HOSPITAL ENCOUNTER (OUTPATIENT)
Facility: MEDICAL CENTER | Age: 64
End: 2024-10-14
Attending: RADIOLOGY | Admitting: RADIOLOGY
Payer: COMMERCIAL

## 2024-10-14 VITALS
BODY MASS INDEX: 22.04 KG/M2 | OXYGEN SATURATION: 95 % | TEMPERATURE: 97.8 F | WEIGHT: 137.13 LBS | HEIGHT: 66 IN | RESPIRATION RATE: 12 BRPM | HEART RATE: 59 BPM | SYSTOLIC BLOOD PRESSURE: 115 MMHG | DIASTOLIC BLOOD PRESSURE: 63 MMHG

## 2024-10-14 DIAGNOSIS — I67.1 DAVF (DURAL ARTERIOVENOUS FISTULA): ICD-10-CM

## 2024-10-14 PROCEDURE — 160002 HCHG RECOVERY MINUTES (STAT)

## 2024-10-14 PROCEDURE — 700111 HCHG RX REV CODE 636 W/ 250 OVERRIDE (IP)

## 2024-10-14 PROCEDURE — C1887 CATHETER, GUIDING: HCPCS

## 2024-10-14 PROCEDURE — 36226 PLACE CATH VERTEBRAL ART: CPT

## 2024-10-14 PROCEDURE — 700117 HCHG RX CONTRAST REV CODE 255: Performed by: RADIOLOGY

## 2024-10-14 PROCEDURE — 160046 HCHG PACU - 1ST 60 MINS PHASE II

## 2024-10-14 PROCEDURE — 160035 HCHG PACU - 1ST 60 MINS PHASE I

## 2024-10-14 PROCEDURE — 160036 HCHG PACU - EA ADDL 30 MINS PHASE I

## 2024-10-14 RX ORDER — MIDAZOLAM HYDROCHLORIDE 1 MG/ML
.5-2 INJECTION INTRAMUSCULAR; INTRAVENOUS PRN
Status: ACTIVE | OUTPATIENT
Start: 2024-10-14 | End: 2024-10-14

## 2024-10-14 RX ORDER — HEPARIN SODIUM 1000 [USP'U]/ML
INJECTION, SOLUTION INTRAVENOUS; SUBCUTANEOUS
Status: COMPLETED
Start: 2024-10-14 | End: 2024-10-14

## 2024-10-14 RX ORDER — OXYCODONE HYDROCHLORIDE 5 MG/1
2.5 TABLET ORAL
Status: DISCONTINUED | OUTPATIENT
Start: 2024-10-14 | End: 2024-10-14 | Stop reason: HOSPADM

## 2024-10-14 RX ORDER — SODIUM CHLORIDE 9 MG/ML
500 INJECTION, SOLUTION INTRAVENOUS
Status: ACTIVE | OUTPATIENT
Start: 2024-10-14 | End: 2024-10-14

## 2024-10-14 RX ORDER — ONDANSETRON 2 MG/ML
4 INJECTION INTRAMUSCULAR; INTRAVENOUS PRN
Status: ACTIVE | OUTPATIENT
Start: 2024-10-14 | End: 2024-10-14

## 2024-10-14 RX ORDER — SODIUM CHLORIDE 9 MG/ML
1000 INJECTION, SOLUTION INTRAVENOUS CONTINUOUS
Status: DISCONTINUED | OUTPATIENT
Start: 2024-10-14 | End: 2024-10-14 | Stop reason: HOSPADM

## 2024-10-14 RX ORDER — VERAPAMIL HYDROCHLORIDE 2.5 MG/ML
INJECTION, SOLUTION INTRAVENOUS
Status: COMPLETED
Start: 2024-10-14 | End: 2024-10-14

## 2024-10-14 RX ORDER — IBUPROFEN 200 MG
400 TABLET ORAL EVERY 8 HOURS PRN
COMMUNITY

## 2024-10-14 RX ORDER — MIDAZOLAM HYDROCHLORIDE 1 MG/ML
INJECTION INTRAMUSCULAR; INTRAVENOUS
Status: COMPLETED
Start: 2024-10-14 | End: 2024-10-14

## 2024-10-14 RX ORDER — HYDROMORPHONE HYDROCHLORIDE 1 MG/ML
0.25 INJECTION, SOLUTION INTRAMUSCULAR; INTRAVENOUS; SUBCUTANEOUS
Status: DISCONTINUED | OUTPATIENT
Start: 2024-10-14 | End: 2024-10-14 | Stop reason: HOSPADM

## 2024-10-14 RX ORDER — OXYCODONE HYDROCHLORIDE 5 MG/1
5 TABLET ORAL
Status: DISCONTINUED | OUTPATIENT
Start: 2024-10-14 | End: 2024-10-14 | Stop reason: HOSPADM

## 2024-10-14 RX ADMIN — NITROGLYCERIN 200 MCG: 20 INJECTION INTRAVENOUS at 10:42

## 2024-10-14 RX ADMIN — VERAPAMIL HYDROCHLORIDE 2.5 MG: 2.5 INJECTION, SOLUTION INTRAVENOUS at 10:42

## 2024-10-14 RX ADMIN — MIDAZOLAM HYDROCHLORIDE 0.5 MG: 1 INJECTION INTRAMUSCULAR; INTRAVENOUS at 10:39

## 2024-10-14 RX ADMIN — HEPARIN SODIUM 2000 UNITS: 1000 INJECTION, SOLUTION INTRAVENOUS; SUBCUTANEOUS at 10:42

## 2024-10-14 RX ADMIN — MIDAZOLAM HYDROCHLORIDE 0.5 MG: 1 INJECTION, SOLUTION INTRAMUSCULAR; INTRAVENOUS at 10:39

## 2024-10-14 RX ADMIN — FENTANYL CITRATE 25 MCG: 50 INJECTION, SOLUTION INTRAMUSCULAR; INTRAVENOUS at 10:39

## 2024-10-14 RX ADMIN — IOHEXOL 61 ML: 300 INJECTION, SOLUTION INTRAVENOUS at 11:30

## 2024-10-14 ASSESSMENT — PAIN DESCRIPTION - PAIN TYPE
TYPE: SURGICAL PAIN

## 2024-10-14 ASSESSMENT — FIBROSIS 4 INDEX: FIB4 SCORE: 1.6

## 2024-10-17 NOTE — ASSESSMENT & PLAN NOTE
----- Message from David Capellan sent at 10/17/2024  8:06 AM EDT -----  10/17/24       Tell her that her Helicobacter pylori breath test is negative, which means that she does not have Helicobacter pylori lining her stomach at this time.  Please send a copy of this report to her PCP.  Obduliox. kjh   2/8/2021 - early ns cataracts. No visually signficant

## 2024-10-28 ENCOUNTER — APPOINTMENT (OUTPATIENT)
Dept: ADMISSIONS | Facility: MEDICAL CENTER | Age: 64
End: 2024-10-28
Payer: COMMERCIAL

## 2024-10-28 DIAGNOSIS — Z01.812 PRE-OPERATIVE LABORATORY EXAMINATION: ICD-10-CM

## 2024-11-04 ENCOUNTER — APPOINTMENT (OUTPATIENT)
Dept: RADIOLOGY | Facility: MEDICAL CENTER | Age: 64
End: 2024-11-04
Attending: RADIOLOGY
Payer: COMMERCIAL

## 2024-12-08 NOTE — PROGRESS NOTES
Vascular Neurology Clinic Note  [facility]    Referring Physician: Felix Campos D.O.    HPI: Lily Sawant is a 64 year old woman who presents to [facility] Neurovascular clinic for follow up of a hospital admission in August 2024.  Let me summarize her neurologic care.  Lily presented to the [facility] ER on 8/30/2024 with a severe headache lasting about 2 days.  There was associated neck pain and photophobia, but she denied having any focal symptoms at that time.   A head CT was suggestive of subarachnoid hemorrhage along the tentorium.  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  She was taken quickly to the IR cath lab, where Dr. Arnold completed a diagnostic angiogram that confirms a dural AV fistula, supplied mainly by bilateral occipital arteries, right middle meningeal artery, and right tentorial artery.  The dural AV fistula with treated with tio embolization, with no residual shunting observed at end of case.  Post-operatively, a contrast MRI brain also did not reveal residual shunting.  She was discharged from the hospital on 9/1/2024.   She had a surveillance catheter angiogram on 10/14 with Dr. Arnold, and this study did not reveal a residual or recurrent dural AV fistula.    Since discharge, Lily reports that she is doing well.  The cognitive fog, easy fatigueability that occurred early in her post-operative course has essentially resolved.  She has returned from traveling in [place] for the past 3 weeks.  She has returned to work as an , and is driving with no difficulties.    Review of systems: In addition to what is detailed in the HPI above, all other systems reviewed and are negative.    Past Medical History:    has a past medical history of Celiac disease (02/13/2015), Family history of ischemic heart disease (IHD), FH: renal cell carcinoma (02/13/2015), Hemorrhagic disorder (HCC)  (08/2024), High cholesterol, Hyperlipidemia (02/13/2015), Hypertension, Migraine headache (02/13/2015), Stroke (HCC) (08/2024), and Thyroid disease.    She has no past medical history of History of melanoma or Personal history of renal cancer.    FHx:  family history includes Arthritis in her mother; Cancer in her brother, father, paternal grandfather, and sister; Glasses in her child, child, child, father, and mother; Glaucoma in her brother; Heart Attack (age of onset: 65) in her maternal uncle; Heart Disease in her maternal grandmother and mother; Lung Disease in her father; Other in her brother, child, mother, and sister.    SHx:   reports that she quit smoking about 46 years ago. Her smoking use included cigarettes. She started smoking about 49 years ago. She has a 0.8 pack-year smoking history. She has never used smokeless tobacco. She reports current alcohol use of about 3.0 oz of alcohol per week. She reports that she does not use drugs.    Allergies:  Allergies   Allergen Reactions    Gluten Meal Diarrhea     Gluten intolerance     Pineapple Diarrhea     Diarrhea        Medications:    Current Outpatient Medications:     ibuprofen (MOTRIN) 200 MG Tab, Take 400 mg by mouth every 8 hours as needed for Headache., Disp: , Rfl:     typhoid (VIVOTIF) SR capsule, Take 1 Capsule by mouth every 48 hours. (Patient taking differently: Take 1 Capsule by mouth every 48 hours. 4 dose course   Indications: Travelling to [place]), Disp: 4 Capsule, Rfl: 0    doxylamine (SLEEP AID) 25 MG Tab tablet, Take 12.5 mg by mouth at bedtime as needed (Sleep). 1/2 tablet = 12.5mg, Disp: , Rfl:     acetaminophen (TYLENOL) 500 MG Tab, Take 500-1,000 mg by mouth every 6 hours as needed for Moderate Pain. Indications: Pain, Disp: , Rfl:     risedronate (ACTONEL) 35 MG Tab, Take 1 Tablet by mouth every 7 days., Disp: 4 Tablet, Rfl: 11    estradiol (ESTRACE) 0.1 MG/GM vaginal cream, Insert 0.5-1g nightly x 1 week then twice weekly  "thereafter for maintenance therapy (Patient taking differently: Insert 0.5-1 g into the vagina see administration instructions. Insert 0.5-1g nightly x 1 week then twice weekly thereafter for maintenance therapy), Disp: 42.5 g, Rfl: 3    atorvastatin (LIPITOR) 20 MG Tab, TAKE 1 TABLET BY MOUTH ONCE DAILY AT BEDTIME (Patient taking differently: Take 20 mg by mouth at bedtime.), Disp: 90 Tablet, Rfl: 3    Physical Examination:   /60 (BP Location: Left arm, Patient Position: Sitting, BP Cuff Size: Adult)   Pulse 73   Temp 36.5 °C (97.7 °F) (Temporal)   Resp 16   Ht 1.676 m (5' 6\")   Wt 64.3 kg (141 lb 12.1 oz)   LMP 08/25/2008   SpO2 96%   BMI 22.88 kg/m²       NEUROLOGICAL EXAM:     Mental status: Awake, alert and fully oriented  Speech and language: Speech is clear and fluent. The patient is able to name and repeat, and follow commands  Cranial nerve exam:  Visual fields are full, extraocular movements are intact, face is symmetric  Motor exam: There is sustained antigravity with no downward drift in bilateral arms and legs.    Sensory exam:  Intact to light touch in all 4 distal extremities, there is no neglect to double stim  Coordination: No ataxia on bilateral finger-to-nose testing  Gait: Deferred due to patient preference    Baseline Modified Marty Scale (MRS): 0 = No symptoms    Objective Data:    Labs  Lab Results   Component Value Date/Time    WBC 5.3 10/08/2024 08:30 AM    RBC 4.49 10/08/2024 08:30 AM    HEMOGLOBIN 14.2 10/08/2024 08:30 AM    HEMATOCRIT 42.6 10/08/2024 08:30 AM    PLATELETCT 226 10/08/2024 08:30 AM      Lab Results   Component Value Date/Time    SODIUM 139 09/01/2024 05:10 AM    POTASSIUM 4.2 09/01/2024 05:10 AM    CHLORIDE 106 09/01/2024 05:10 AM    CO2 24 09/01/2024 05:10 AM    GLUCOSE 144 (H) 09/01/2024 05:10 AM    BUN 13 09/01/2024 05:10 AM    CREATININE 0.80 10/08/2024 08:30 AM    BUNCREATRAT 22 03/17/2022 09:00 PM      Lab Results   Component Value Date/Time    " CHOLSTRLTOT 187 08/30/2024 05:30 AM    LDL 82 08/30/2024 05:30 AM    HDL 91 08/30/2024 05:30 AM    TRIGLYCERIDE 72 08/30/2024 05:30 AM       Lab Results   Component Value Date/Time    HBA1C 5.5 08/02/2024 06:56 AM           Imaging/Testing:  I interpreted and/or reviewed the patient's neuroimaging    MRI brain (8/30/2024):  IMPRESSION:     1.  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 There is no MR evidence of residual fistula. 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 Prominent cerebellar veins are again seen which is the sequela of the   dural AV fistula in the expected to be resolved in future.  2.  Catheter angiogram is recommended after 3 months.  3.  Minimal subdural hemorrhage.  4.  Minimal subarachnoid hemorrhage.     CT angiogram of head (8/30/2024):  MPRESSION:      1.  There is aneurysm-like structure in the right posterior fossa near the midline measuring 5.5 mm, could represent arterial or venous aneurysm.  2.  Multiple tortuous vascular structures in the right posterior fossa compatible with vascular malformation  3.  No large vessel occlusion identified.  4.  Layering subarachnoid hemorrhage along the bilateral tentorium with subarachnoid hemorrhages in the right posterior fossa.     Assessment and Plan:  Lily Sawant is a 64 year old woman with severe headache, found to have mild tentorial SAH and a tentorial dural AV fistula in August of 2024.  She underwent successful tio embolization, with no evidence of recurrent or residual dural AV fistula on follow up catheter angiogram in October 2024.  She reports that she has made a near-complete recovery from her SAH, and the cognitive fog and fatigue has mostly resolved.  We reviewed the CT angiogram and catheter angiogram together.      Problem list:   Dural arteriovenous fistula   History of SAH     Plan:   - no additional neurodiagnostics recommended at this time   - return to clinic as  needed    Bernabe Zazueta MD  Vascular Neurology

## 2024-12-11 ENCOUNTER — OFFICE VISIT (OUTPATIENT)
Dept: NEUROLOGY | Facility: MEDICAL CENTER | Age: 64
End: 2024-12-11
Attending: PSYCHIATRY & NEUROLOGY
Payer: COMMERCIAL

## 2024-12-11 VITALS
HEART RATE: 73 BPM | TEMPERATURE: 97.7 F | BODY MASS INDEX: 22.78 KG/M2 | SYSTOLIC BLOOD PRESSURE: 104 MMHG | HEIGHT: 66 IN | WEIGHT: 141.76 LBS | OXYGEN SATURATION: 96 % | DIASTOLIC BLOOD PRESSURE: 60 MMHG | RESPIRATION RATE: 16 BRPM

## 2024-12-11 DIAGNOSIS — I60.9 SAH (SUBARACHNOID HEMORRHAGE) (HCC): ICD-10-CM

## 2024-12-11 DIAGNOSIS — I67.1 CEREBROVASCULAR DURAL AV FISTULA: ICD-10-CM

## 2024-12-11 PROCEDURE — 3078F DIAST BP <80 MM HG: CPT | Performed by: PSYCHIATRY & NEUROLOGY

## 2024-12-11 PROCEDURE — 99212 OFFICE O/P EST SF 10 MIN: CPT

## 2024-12-11 PROCEDURE — 3074F SYST BP LT 130 MM HG: CPT | Performed by: PSYCHIATRY & NEUROLOGY

## 2024-12-11 PROCEDURE — 99213 OFFICE O/P EST LOW 20 MIN: CPT | Performed by: PSYCHIATRY & NEUROLOGY

## 2024-12-11 ASSESSMENT — FIBROSIS 4 INDEX: FIB4 SCORE: 1.6

## 2025-01-16 ENCOUNTER — OFFICE VISIT (OUTPATIENT)
Dept: SPORTS MEDICINE | Facility: OTHER | Age: 65
End: 2025-01-16
Payer: COMMERCIAL

## 2025-01-16 VITALS
TEMPERATURE: 98.1 F | HEIGHT: 66 IN | SYSTOLIC BLOOD PRESSURE: 120 MMHG | HEART RATE: 74 BPM | OXYGEN SATURATION: 96 % | RESPIRATION RATE: 18 BRPM | BODY MASS INDEX: 22.78 KG/M2 | DIASTOLIC BLOOD PRESSURE: 76 MMHG | WEIGHT: 141.76 LBS
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
## 2025-01-16 DIAGNOSIS — S13.4XXA WHIPLASH INJURY SYNDROME, INITIAL ENCOUNTER: ICD-10-CM

## 2025-01-16 DIAGNOSIS — S16.1XXA CERVICAL STRAIN, ACUTE, INITIAL ENCOUNTER: ICD-10-CM

## 2025-01-16 PROCEDURE — 99214 OFFICE O/P EST MOD 30 MIN: CPT | Performed by: FAMILY MEDICINE

## 2025-01-16 PROCEDURE — 3074F SYST BP LT 130 MM HG: CPT | Performed by: FAMILY MEDICINE

## 2025-01-16 PROCEDURE — 3078F DIAST BP <80 MM HG: CPT | Performed by: FAMILY MEDICINE

## 2025-01-16 ASSESSMENT — ENCOUNTER SYMPTOMS
VOMITING: 0
NAUSEA: 0
SHORTNESS OF BREATH: 0
FEVER: 0
DIZZINESS: 0
CHILLS: 0

## 2025-01-16 ASSESSMENT — FIBROSIS 4 INDEX: FIB4 SCORE: 1.6
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
## 2025-03-05 ENCOUNTER — HOSPITAL ENCOUNTER (OUTPATIENT)
Dept: LAB | Facility: MEDICAL CENTER | Age: 65
End: 2025-03-05
Attending: INTERNAL MEDICINE
Payer: COMMERCIAL

## 2025-03-05 DIAGNOSIS — E55.9 VITAMIN D DEFICIENCY: ICD-10-CM

## 2025-03-05 DIAGNOSIS — E04.2 MULTIPLE THYROID NODULES: ICD-10-CM

## 2025-03-05 DIAGNOSIS — M85.80 OSTEOPENIA, UNSPECIFIED LOCATION: ICD-10-CM

## 2025-03-05 LAB
25(OH)D3 SERPL-MCNC: 61 NG/ML (ref 30–100)
ALBUMIN SERPL BCP-MCNC: 4.6 G/DL (ref 3.2–4.9)
ALBUMIN/GLOB SERPL: 1.7 G/DL
ALP SERPL-CCNC: 55 U/L (ref 30–99)
ALT SERPL-CCNC: 24 U/L (ref 2–50)
ANION GAP SERPL CALC-SCNC: 11 MMOL/L (ref 7–16)
AST SERPL-CCNC: 29 U/L (ref 12–45)
BILIRUB SERPL-MCNC: 0.5 MG/DL (ref 0.1–1.5)
BUN SERPL-MCNC: 12 MG/DL (ref 8–22)
CALCIUM ALBUM COR SERPL-MCNC: 8.5 MG/DL (ref 8.5–10.5)
CALCIUM SERPL-MCNC: 9 MG/DL (ref 8.5–10.5)
CHLORIDE SERPL-SCNC: 101 MMOL/L (ref 96–112)
CO2 SERPL-SCNC: 25 MMOL/L (ref 20–33)
CREAT SERPL-MCNC: 0.78 MG/DL (ref 0.5–1.4)
GFR SERPLBLD CREATININE-BSD FMLA CKD-EPI: 83 ML/MIN/1.73 M 2
GLOBULIN SER CALC-MCNC: 2.7 G/DL (ref 1.9–3.5)
GLUCOSE SERPL-MCNC: 88 MG/DL (ref 65–99)
PHOSPHATE SERPL-MCNC: 3 MG/DL (ref 2.5–4.5)
POTASSIUM SERPL-SCNC: 4.1 MMOL/L (ref 3.6–5.5)
PROT SERPL-MCNC: 7.3 G/DL (ref 6–8.2)
PTH-INTACT SERPL-MCNC: 55.4 PG/ML (ref 14–72)
SODIUM SERPL-SCNC: 137 MMOL/L (ref 135–145)

## 2025-03-05 PROCEDURE — 82523 COLLAGEN CROSSLINKS: CPT

## 2025-03-05 PROCEDURE — 84156 ASSAY OF PROTEIN URINE: CPT

## 2025-03-05 PROCEDURE — 82306 VITAMIN D 25 HYDROXY: CPT

## 2025-03-05 PROCEDURE — 36415 COLL VENOUS BLD VENIPUNCTURE: CPT

## 2025-03-05 PROCEDURE — 84165 PROTEIN E-PHORESIS SERUM: CPT

## 2025-03-05 PROCEDURE — 86258 DGP ANTIBODY EACH IG CLASS: CPT | Mod: 91

## 2025-03-05 PROCEDURE — 86364 TISS TRNSGLTMNASE EA IG CLAS: CPT | Mod: 91

## 2025-03-05 PROCEDURE — 84155 ASSAY OF PROTEIN SERUM: CPT

## 2025-03-05 PROCEDURE — 80053 COMPREHEN METABOLIC PANEL: CPT

## 2025-03-05 PROCEDURE — 83970 ASSAY OF PARATHORMONE: CPT

## 2025-03-05 PROCEDURE — 84100 ASSAY OF PHOSPHORUS: CPT

## 2025-03-07 LAB
COLLAGEN CTX SERPL-MCNC: 263 PG/ML
GLIADIN IGA SER IA-ACNC: <0.72 FLU (ref 0–4.99)
GLIADIN IGG SER IA-ACNC: <0.56 FLU (ref 0–4.99)
TTG IGA SER IA-ACNC: <1.02 FLU (ref 0–4.99)
TTG IGG SER IA-ACNC: <0.82 FLU (ref 0–4.99)

## 2025-03-09 LAB
ALBUMIN SERPL ELPH-MCNC: 4.62 G/DL (ref 3.75–5.01)
ALPHA1 GLOB SERPL ELPH-MCNC: 0.29 G/DL (ref 0.19–0.46)
ALPHA2 GLOB SERPL ELPH-MCNC: 0.66 G/DL (ref 0.48–1.05)
B-GLOBULIN SERPL ELPH-MCNC: 0.69 G/DL (ref 0.48–1.1)
GAMMA GLOB SERPL ELPH-MCNC: 0.74 G/DL (ref 0.62–1.51)
INTERPRETATION SERPL IFE-IMP: NORMAL
MONOCLON BAND OBS SERPL: NORMAL
MONOCLONAL PROTEIN NL11656: NORMAL G/DL
PATHOLOGY STUDY: NORMAL
PROT SERPL-MCNC: 7 G/DL (ref 6.3–8.2)

## 2025-03-11 LAB
ALBUMIN 24H MFR UR ELPH: 100 %
ALPHA1 GLOB 24H MFR UR ELPH: 0 %
ALPHA2 GLOB 24H MFR UR ELPH: 0 %
B-GLOBULIN 24H MFR UR ELPH: 0 %
COLLECT DURATION TIME SPEC: NORMAL HR
EER MONOCLONAL PROTEIN STUDY, 24 HOUR U Q5964: NORMAL
GAMMA GLOB 24H MFR UR ELPH: 0 %
INTERPRETATION UR IFE-IMP: NORMAL
M PROTEIN 24H MFR UR ELPH: 0 %
M PROTEIN 24H UR ELPH-MRATE: NORMAL MG/24 HRS
PROT 24H UR-MRATE: <6 MG/DL
PROT 24H UR-MRATE: NORMAL G/(24.H) (ref 40–150)
SPECIMEN VOL ?TM UR: NORMAL ML

## 2025-03-12 ENCOUNTER — OFFICE VISIT (OUTPATIENT)
Dept: ENDOCRINOLOGY | Facility: MEDICAL CENTER | Age: 65
End: 2025-03-12
Attending: INTERNAL MEDICINE
Payer: COMMERCIAL

## 2025-03-12 VITALS
OXYGEN SATURATION: 99 % | WEIGHT: 146 LBS | HEART RATE: 62 BPM | DIASTOLIC BLOOD PRESSURE: 68 MMHG | HEIGHT: 66 IN | SYSTOLIC BLOOD PRESSURE: 110 MMHG | BODY MASS INDEX: 23.46 KG/M2

## 2025-03-12 DIAGNOSIS — Z80.51 FAMILY HISTORY OF RENAL CANCER: ICD-10-CM

## 2025-03-12 DIAGNOSIS — E55.9 VITAMIN D DEFICIENCY: ICD-10-CM

## 2025-03-12 DIAGNOSIS — M85.80 OSTEOPENIA, UNSPECIFIED LOCATION: ICD-10-CM

## 2025-03-12 DIAGNOSIS — N28.1 CYST OF LEFT KIDNEY: ICD-10-CM

## 2025-03-12 DIAGNOSIS — E04.2 MULTIPLE THYROID NODULES: ICD-10-CM

## 2025-03-12 PROCEDURE — 99211 OFF/OP EST MAY X REQ PHY/QHP: CPT | Performed by: INTERNAL MEDICINE

## 2025-03-12 RX ORDER — RISEDRONATE SODIUM 35 MG/1
35 TABLET, FILM COATED ORAL
Qty: 12 TABLET | Refills: 3 | Status: SHIPPED | OUTPATIENT
Start: 2025-03-12

## 2025-03-12 ASSESSMENT — FIBROSIS 4 INDEX: FIB4 SCORE: 1.73

## 2025-03-12 NOTE — PROGRESS NOTES
Chief Complaint: Follow up of Thyroid Nodules and osteopenia    HPI:     Lily Sawant is a 64 y.o. female with history of Thyroid Nodules is here for follow up evaluation.     Her comorbid issues include celiac disease, hyperlipidemia, renal cyst and osteopenia with low fracture risk based on FRAX    She was incidentally found to have thyroid nodules on carotid ultrasound done in 2021.      She denies a family history of thyroid cancer and denies radiation exposure.    Formal ultrasound on May 2021 showed a dominant 2 cm isoechoic solid nodule on the right lower lobe which was biopsied and proven benign on June 1, 2021.   She also had a low risk 1.3 cm complex cyst with internal septations on the right upper lobe     Ultrasound on May 27, 2022 showed stability of the previously detected nodules in the right upper lobe and right lower lobe    She then had a follow-up ultrasound in October 2023 which showed suspicious microcalcifications for the right upper lobe complex nodule measuring 1.3 cm  There was stability of the hypoechoic nodule on the right mid lobe measuring 1.16 cm  There was stability of the previously biopsied isoechoic solid nodule measuring 1.6 cm on the right lower lobe    Ultrasound-guided biopsy of the right upper lobe complex nodule on January 2024 came back benign        On follow-up she is doing fair  She was admitted at [facility] for SAH due to an AV malformation and it was treated with embolization and she is doing better  She denies dysphagia hoarseness and dyspnea  She denies interval falls and fractures    She remains biochemically euthyroid    We talked about getting an updated ultrasound of her thyroid    Her lipids are well-controlled while on atorvastatin     Latest Reference Range & Units 08/02/24 06:56   TSH 0.350 - 5.500 uIU/mL  0.380 - 5.330 uIU/mL 2.580  2.700   Free T-4 0.93 - 1.70 ng/dL 1.25      Latest Reference Range & Units 08/02/24 06:56   Cholesterol,Tot 100 - 199 mg/dL  208 (H)   Triglycerides 0 - 149 mg/dL 56   HDL >=40 mg/dL 98   LDL <100 mg/dL 99         She does have osteopenia diagnosed on her bone density on April 28, 2021 with lowest T score of -2.1 for left hip FRAX scores were not significantly elevated    Follow-up bone density on June 2, 2022 showed stable osteopenia with lowest T score of -0.9 for the left hip.    Follow-up bone density on August 19, 2024 showed osteopenia with lowest T-score of -1.4 for the left hip with significant bone loss when compared to previous bone density.  There was 6.2% decrease in bone mineral density for the left hip.  In addition her FRAX score was significantly elevated and her 10-year risk for any major osteoporotic fracture was 25.8%      She she has been on risedronate 35 mg every 7 days since last visit  This was started in 2024  She denies side effects  No dental problems  She is taking daily multivitamins.  She has parental history of hip fracture  She denies personal history of fragility fractures  She exercises regularly and lifts weights      And workup for secondary causes of bone loss was negative for multiple myeloma and celiac disease and hyperparathyroidism     Latest Reference Range & Units 03/05/25 07:26   Creatinine 0.50 - 1.40 mg/dL 0.78   GFR (CKD-EPI) >60 mL/min/1.73 m 2 83   Calcium 8.5 - 10.5 mg/dL 9.0   Correct Calcium 8.5 - 10.5 mg/dL 8.5   AST(SGOT) 12 - 45 U/L 29   ALT(SGPT) 2 - 50 U/L 24   Alkaline Phosphatase 30 - 99 U/L 55   Total Bilirubin 0.1 - 1.5 mg/dL 0.5      Latest Reference Range & Units 03/05/25 07:26   25-Hydroxy   Vitamin D 25 30 - 100 ng/mL 61   C. Telopeptide B Cross Linked pg/mL 263   t-TG IgA 0.00 - 4.99 FLU <1.02   t-TG IgG 0.00 - 4.99 FLU <0.82   Pth, Intact 14.0 - 72.0 pg/mL 55.4   Gliadin Antibodies Iga 0.00 - 4.99 FLU <0.72   Gliadin Antibodies Igg 0.00 - 4.99 FLU <0.56         She reports that she has a strong family history of RCC with her father and maternal grandfather.  She previously  had an ultrasound 2 years ago of her kidneys and this showed a small renal cyst        Patient's medications, allergies, and social histories were reviewed and updated as appropriate.      ROS:      CONS:     No fever, no chills   EYES:     No diplopia, no blurry vision   CV:           No chest pain, no palpitations   PULM:     No SOB, no cough, no hemoptysis.   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  2023-08: Postmenopausal bleeding  2023-08: Endometrial polyp  2023-06: Vaginal bleeding  2022-04: Myopia of both eyes  2021-04: Multiple thyroid nodules  2021-02: Dermatochalasis of left upper eyelid  2021-02: Glaucoma suspect of both eyes  2021-02: Age-related nuclear cataract of both eyes  2019-08: PVD (posterior vitreous detachment), bilateral  2019-08: Acute conjunctivitis of left eye  2017-10: 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  2016-11: Elevated Lp(a)  2016-11: Vitamin D deficiency  2016-09: Mixed hyperlipidemia  2016-09: Atherosclerosis of both carotid arteries, mild per 2016   Ultrasound  2016-05: Postmenopausal  2016-02: Renal cyst, left  2016-02: Right foot pain  2016-02: Onychomycosis of left great toe  2015-03: Right shoulder pain  2015-02: Migraine with aura and without status migrainosus, not   intractable  2015-02: Depression  2015-02: Celiac disease  2015-02: FH: renal cell carcinoma  2015-02: Family history of malignant melanoma  2015-02: Hyperlipidemia  Family history of ischemic heart disease (IHD)      Past Surgical History:  Past Surgical History:   Procedure Laterality Date    HYSTEROSCOPY WITH MYOSURE N/A 08/23/2023    Procedure: HYSTEROSCOPY DILATION AND CURETTAGE, POLYPECTOMY;  Surgeon: Vivian De La Paz D.O.;   "Location: SURGERY SAME DAY [facility];  Service: Obstetrics    DILATION AND CURETTAGE N/A 2023    Procedure: DILATION AND CURETTAGE;  Surgeon: Vivian De La Paz D.O.;  Location: SURGERY SAME DAY [facility];  Service: Obstetrics    DILATION AND EVACUATION   &     OTHER      Thyroid biopsy    OTHER      \"gynecological biopsy\"    TONSILLECTOMY          Allergies:  Gluten meal and Pineapple     Social History:  Social History     Tobacco Use    Smoking status: Former     Current packs/day: 0.00     Average packs/day: 0.3 packs/day for 3.0 years (0.8 ttl pk-yrs)     Types: Cigarettes     Start date: 1975     Quit date: 1978     Years since quittin.1    Smokeless tobacco: Never   Vaping Use    Vaping status: Never Used   Substance Use Topics    Alcohol use: Yes     Alcohol/week: 3.0 oz     Types: 5 Glasses of wine per week     Comment: weekly    Drug use: No        Family History:   family history includes Arthritis in her mother; Cancer in her brother, father, paternal grandfather, and sister; Glasses in her child, child, child, father, and mother; Glaucoma in her brother; Heart Attack (age of onset: 65) in her maternal uncle; Heart Disease in her maternal grandmother and mother; Lung Disease in her father; Other in her brother, child, mother, and sister.      PHYSICAL EXAM:   Vital signs: /68 (BP Location: Left arm, Patient Position: Sitting, BP Cuff Size: Adult long)   Pulse 62   Ht 1.676 m (5' 6\")   Wt 66.2 kg (146 lb)   LMP 2008   SpO2 99%   BMI 23.57 kg/m²   GENERAL: Well-developed, well-nourished in no apparent distress.   EYE:  No ocular asymmetry, PERRLA  HENT: Pink, moist mucous membranes.    NECK: No thyromegaly.   CARDIOVASCULAR:  No murmurs  LUNGS: Clear breath sounds  ABDOMEN: Soft, nontender   EXTREMITIES: No clubbing, cyanosis, or edema.   NEUROLOGICAL: No gross focal motor abnormalities   LYMPH: No cervical adenopathy palpated.   SKIN: No rashes, lesions. "       Labs:  Lab Results   Component Value Date/Time    WBC 5.3 10/08/2024 08:30 AM    RBC 4.49 10/08/2024 08:30 AM    HEMOGLOBIN 14.2 10/08/2024 08:30 AM    MCV 94.9 10/08/2024 08:30 AM    MCH 31.6 10/08/2024 08:30 AM    MCHC 33.3 10/08/2024 08:30 AM    RDW 42.1 10/08/2024 08:30 AM    MPV 10.1 10/08/2024 08:30 AM       Lab Results   Component Value Date/Time    SODIUM 137 2025 07:26 AM    POTASSIUM 4.1 2025 07:26 AM    CHLORIDE 101 2025 07:26 AM    CO2 25 2025 07:26 AM    ANION 11.0 2025 07:26 AM    GLUCOSE 88 2025 07:26 AM    BUN 12 2025 07:26 AM    CREATININE 0.78 2025 07:26 AM    CALCIUM 9.0 2025 07:26 AM    ASTSGOT 29 2025 07:26 AM    ALTSGPT 24 2025 07:26 AM    TBILIRUBIN 0.5 2025 07:26 AM    ALBUMIN 4.62 2025 07:26 AM    ALBUMIN 4.6 2025 07:26 AM    TOTPROTEIN 7.0 2025 07:26 AM    TOTPROTEIN 7.3 2025 07:26 AM    GLOBULIN 2.7 2025 07:26 AM    AGRATIO 1.7 2025 07:26 AM       Lab Results   Component Value Date/Time    TSHULTRASEN 2.420 2021 0800     Lab Results   Component Value Date/Time    FREET4 1.27 2021 0800     No results found for: FREET3  No results found for: THYSTIMIG      Imagin2022 8:26 AM     HISTORY/REASON FOR EXAM:  Follow-up for thyroid nodules        TECHNIQUE/EXAM DESCRIPTION:  Ultrasound of the soft tissues of the head and neck.     COMPARISON:  Previous ultrasound on May 2021     FINDINGS:  The thyroid gland is homogeneous.  Vascularity is normal.     The right lobe of the thyroid gland measures 4.89 cm x 1.28 cm x 1.40 cm cm.  The left lobe of the thyroid gland measures 4.18 cm x 1.13 cm x 1.28 cm cm.  The isthmus measures 0.17 cm cm.     Nodules >= 1cm:        Nodule #1  There is a(n) hypoechoic wider than tall mixed nodule with smooth margins and no echogenic foci measuring 1.24 x 0.81 x 0.95 cm located on the right upper lobe.     Nodule #2  There is a(n)  isoechoic wider than tall solid nodule with smooth margins and no echogenic foci measuring 1.44 x 0.78 x 1.09 cm located on the right lower lobe.     There are no suspicious lateral neck node seen     IMPRESSION:     Homogenous thyroid gland with dominant:  Stable Low suspicion complex nodule located on the right upper lobe  Stable, previously biopsied Low suspicion solid isoechoic nodule on the right lower lobe     ANALIA Recommendations  Observation - repeat US in 6 to 12 months in the office.  Schedule for biopsy in the office:  Low suspicion nodule if measuring >/= to 1.5 cm           US report completed and dictated by  Bernabe Mann MD, FACE, N        10/9/2023 7:46 AM     HISTORY/REASON FOR EXAM:  Follow-up for thyroid nodules        TECHNIQUE/EXAM DESCRIPTION:  Ultrasound of the soft tissues of the head and neck.     COMPARISON:  Previous ultrasound in 2022     FINDINGS:  The thyroid gland is homogeneous.  Vascularity is normal.     The right lobe of the thyroid gland measures 5.42 cm x 1.23 cm x 1.46 cm cm.  The left lobe of the thyroid gland measures 4.64 cm x 0.73 cm x 0.97 cm cm.  The isthmus measures 0.12 cm cm.     Nodules >= 1cm:        Nodule #1  There is a(n) hypoechoic wider than tall mixed nodule with irregular margins and microcalcification measuring 1.31 x 0.93 x 1.10 cm located on the right upper lobe.     Nodule #2  There is a(n) hypoechoic wider than tall cystic nodule with smooth margins and no echogenic foci measuring 1.16 x 0.64 x 0.69 cm located on the right mid lobe.     Nodule #3  There is a(n) isoechoic wider than tall solid nodule with smooth margins and no echogenic foci measuring 1.61 x 0.89 x 1.31 cm located on the right lower lobe.              IMPRESSION:     Homogenous thyroid gland with dominant:  Low suspicion hypoechoic 1.3 cm complex nodule with suspicious echogenic foci seen on right upper lobe  Benign cyst on right lateral mid lobe measuring 1.0 cm  Stable low suspicion  previously biopsied isoechoic solid nodule on right lower lobe measuring 1.6 cm     ANALIA Recommendations  Schedule for biopsy of right upper lobe complex nodule       US report completed and dictated by  Bernabe Mann MD, FACE, ECNU              8/19/2024 9:58 AM     HISTORY/REASON FOR EXAM:  Postmenopausal, adult bone fracture, history of parent with hip fracture.     TECHNIQUE/EXAM DESCRIPTION AND NUMBER OF VIEWS:  Dual x-ray bone densitometry was performed from the L1 through L4 levels and from the proximal left femur utilizing the GE Prodigy unit.     COMPARISON: Bone densitometry scan 6/16/2022.     FINDINGS:  The lumbar spine has a mean bone mineral density of 1.102 g/cm2, with a T score of -0.6 and a Z score of 1.0.     The proximal left femur has a mean bone mineral density of 0.835 g/cm2, with a T score of -1.4 and a Z score of -0.1.     When compared with the most recent study dated 6/16/2022, there has been a 1.8% decrease in the bone mineral density of the lumbar spine and a 6.2% decrease in the bone mineral density of the proximal left femur.     IMPRESSION:     1.  According to the World Health Organization classification, bone mineral density of this patient is osteopenic in the left proximal femur and normal in the lumbar spine.     2.  Interval decrease in left proximal femur bone density. No significant change in lumbar spine bone density     10-year Probability of Fracture:  Major Osteoporotic     25.8%  Hip     2.4%  Population      [place] ()     Based on left femur neck BMD     INTERPRETING LOCATION: [address] [place] [address], [place]O, [id number]        ASSESSMENT/PLAN:     1. Osteopenia, unspecified location  Stable  Continue risedronate  Discussed the importance of regular weightbearing exercise  Discussed the importance of good dental hygiene and regular dental visits  Discussed the importance of adequate calcium and vitamin supplementation  Reviewed fall precautions  She should notify me  if she has any falls or fractures  Bone density should be repeated on August 19, 2025 or 2026    2. Multiple thyroid nodules  Stable  She has previously biopsied complex nodule on the right upper lobe 1.3 cm  She has a stable cyst on the right mid lobe measuring 1.16 cm  She has a stable previously biopsied solid nodule measuring 1.6 cm on the right lower lobe  I am scheduling her for repeat ultrasound with radiology and I will update her    3. Vitamin D deficiency  Stable   Vitamin D labs were reviewed with patient  Continue current supplements  Continue monitoring levels       4. Cyst of left kidney  Stable we will schedule for renal ultrasound    5. Family history of renal cancer  Stable she has a strong family history of RCC will schedule for renal ultrasound and update her      Return in about 1 year (around 3/12/2026).      Total time spent on day of service was over 60 minutes which included obtaining a detailed history and physical exam, ordering labs, coordinating care and scheduling future follow-up    Thank you kindly for allowing me to participate in the thyroid care plan for this patient.    Bernabe Mann MD, FACE, [facility]    CC:   Felix Campos D.O.

## 2025-03-16 ENCOUNTER — APPOINTMENT (OUTPATIENT)
Dept: RADIOLOGY | Facility: MEDICAL CENTER | Age: 65
End: 2025-03-16
Attending: EMERGENCY MEDICINE
Payer: COMMERCIAL

## 2025-03-16 ENCOUNTER — PHARMACY VISIT (OUTPATIENT)
Dept: PHARMACY | Facility: MEDICAL CENTER | Age: 65
End: 2025-03-16
Payer: COMMERCIAL

## 2025-03-16 ENCOUNTER — HOSPITAL ENCOUNTER (EMERGENCY)
Facility: MEDICAL CENTER | Age: 65
End: 2025-03-16
Attending: EMERGENCY MEDICINE
Payer: COMMERCIAL

## 2025-03-16 VITALS
HEART RATE: 67 BPM | WEIGHT: 139.77 LBS | SYSTOLIC BLOOD PRESSURE: 119 MMHG | OXYGEN SATURATION: 96 % | DIASTOLIC BLOOD PRESSURE: 71 MMHG | RESPIRATION RATE: 16 BRPM | TEMPERATURE: 97.1 F | HEIGHT: 66 IN | BODY MASS INDEX: 22.46 KG/M2

## 2025-03-16 DIAGNOSIS — E04.1 THYROID NODULE: ICD-10-CM

## 2025-03-16 DIAGNOSIS — S09.90XA CLOSED HEAD INJURY, INITIAL ENCOUNTER: ICD-10-CM

## 2025-03-16 DIAGNOSIS — D18.09 HEMANGIOMA OF OTHER SITES: ICD-10-CM

## 2025-03-16 DIAGNOSIS — S06.0X0A CONCUSSION WITHOUT LOSS OF CONSCIOUSNESS, INITIAL ENCOUNTER: ICD-10-CM

## 2025-03-16 DIAGNOSIS — S16.1XXA STRAIN OF NECK MUSCLE, INITIAL ENCOUNTER: ICD-10-CM

## 2025-03-16 LAB
ANION GAP SERPL CALC-SCNC: 12 MMOL/L (ref 7–16)
APTT PPP: 27.6 SEC (ref 24.7–36)
BASOPHILS # BLD AUTO: 0.5 % (ref 0–1.8)
BASOPHILS # BLD: 0.03 K/UL (ref 0–0.12)
BUN SERPL-MCNC: 12 MG/DL (ref 8–22)
CALCIUM SERPL-MCNC: 10 MG/DL (ref 8.5–10.5)
CHLORIDE SERPL-SCNC: 104 MMOL/L (ref 96–112)
CO2 SERPL-SCNC: 24 MMOL/L (ref 20–33)
CREAT SERPL-MCNC: 0.8 MG/DL (ref 0.5–1.4)
EOSINOPHIL # BLD AUTO: 0.03 K/UL (ref 0–0.51)
EOSINOPHIL NFR BLD: 0.5 % (ref 0–6.9)
ERYTHROCYTE [DISTWIDTH] IN BLOOD BY AUTOMATED COUNT: 42.1 FL (ref 35.9–50)
GFR SERPLBLD CREATININE-BSD FMLA CKD-EPI: 81 ML/MIN/1.73 M 2
GLUCOSE SERPL-MCNC: 93 MG/DL (ref 65–99)
HCT VFR BLD AUTO: 44.7 % (ref 37–47)
HGB BLD-MCNC: 14.8 G/DL (ref 12–16)
IMM GRANULOCYTES # BLD AUTO: 0.02 K/UL (ref 0–0.11)
IMM GRANULOCYTES NFR BLD AUTO: 0.3 % (ref 0–0.9)
INR PPP: 0.95 (ref 0.87–1.13)
LYMPHOCYTES # BLD AUTO: 1.2 K/UL (ref 1–4.8)
LYMPHOCYTES NFR BLD: 18.5 % (ref 22–41)
MCH RBC QN AUTO: 31.5 PG (ref 27–33)
MCHC RBC AUTO-ENTMCNC: 33.1 G/DL (ref 32.2–35.5)
MCV RBC AUTO: 95.1 FL (ref 81.4–97.8)
MONOCYTES # BLD AUTO: 0.38 K/UL (ref 0–0.85)
MONOCYTES NFR BLD AUTO: 5.8 % (ref 0–13.4)
NEUTROPHILS # BLD AUTO: 4.84 K/UL (ref 1.82–7.42)
NEUTROPHILS NFR BLD: 74.4 % (ref 44–72)
NRBC # BLD AUTO: 0 K/UL
NRBC BLD-RTO: 0 /100 WBC (ref 0–0.2)
PLATELET # BLD AUTO: 235 K/UL (ref 164–446)
PMV BLD AUTO: 10.2 FL (ref 9–12.9)
POTASSIUM SERPL-SCNC: 4.2 MMOL/L (ref 3.6–5.5)
PROTHROMBIN TIME: 12.6 SEC (ref 12–14.6)
RBC # BLD AUTO: 4.7 M/UL (ref 4.2–5.4)
SODIUM SERPL-SCNC: 140 MMOL/L (ref 135–145)
WBC # BLD AUTO: 6.5 K/UL (ref 4.8–10.8)

## 2025-03-16 PROCEDURE — 85730 THROMBOPLASTIN TIME PARTIAL: CPT

## 2025-03-16 PROCEDURE — 80048 BASIC METABOLIC PNL TOTAL CA: CPT

## 2025-03-16 PROCEDURE — 36415 COLL VENOUS BLD VENIPUNCTURE: CPT

## 2025-03-16 PROCEDURE — RXMED WILLOW AMBULATORY MEDICATION CHARGE: Performed by: EMERGENCY MEDICINE

## 2025-03-16 PROCEDURE — 700117 HCHG RX CONTRAST REV CODE 255: Performed by: EMERGENCY MEDICINE

## 2025-03-16 PROCEDURE — 700102 HCHG RX REV CODE 250 W/ 637 OVERRIDE(OP): Performed by: EMERGENCY MEDICINE

## 2025-03-16 PROCEDURE — 99284 EMERGENCY DEPT VISIT MOD MDM: CPT

## 2025-03-16 PROCEDURE — 72125 CT NECK SPINE W/O DYE: CPT

## 2025-03-16 PROCEDURE — 85025 COMPLETE CBC W/AUTO DIFF WBC: CPT

## 2025-03-16 PROCEDURE — 70496 CT ANGIOGRAPHY HEAD: CPT

## 2025-03-16 PROCEDURE — A9270 NON-COVERED ITEM OR SERVICE: HCPCS | Performed by: EMERGENCY MEDICINE

## 2025-03-16 PROCEDURE — 70498 CT ANGIOGRAPHY NECK: CPT

## 2025-03-16 PROCEDURE — 85610 PROTHROMBIN TIME: CPT

## 2025-03-16 RX ORDER — METHOCARBAMOL 500 MG/1
500 TABLET, FILM COATED ORAL ONCE
Status: COMPLETED | OUTPATIENT
Start: 2025-03-16 | End: 2025-03-16

## 2025-03-16 RX ORDER — IBUPROFEN 600 MG/1
600 TABLET, FILM COATED ORAL EVERY 6 HOURS PRN
Qty: 20 TABLET | Refills: 0 | Status: SHIPPED | OUTPATIENT
Start: 2025-03-16

## 2025-03-16 RX ORDER — METHOCARBAMOL 500 MG/1
500 TABLET, FILM COATED ORAL EVERY 6 HOURS PRN
Qty: 20 TABLET | Refills: 0 | Status: SHIPPED | OUTPATIENT
Start: 2025-03-16

## 2025-03-16 RX ADMIN — METHOCARBAMOL 500 MG: 500 TABLET ORAL at 11:36

## 2025-03-16 RX ADMIN — IOHEXOL 80 ML: 350 INJECTION, SOLUTION INTRAVENOUS at 13:45

## 2025-03-16 ASSESSMENT — FIBROSIS 4 INDEX: FIB4 SCORE: 1.73

## 2025-03-16 ASSESSMENT — LIFESTYLE VARIABLES
HAVE YOU EVER FELT YOU SHOULD CUT DOWN ON YOUR DRINKING: NO
DO YOU DRINK ALCOHOL: YES
DOES PATIENT WANT TO STOP DRINKING: NO

## 2025-03-16 NOTE — ED NOTES
Breaknote-.Patient discharged in stable condition per orders with . IV access removed - bandage applied. Wristband removed per protocol. Patient verbalized understanding of all discharge instructions. All belongings accounted for.
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
## 2025-03-16 NOTE — DISCHARGE INSTRUCTIONS
Follow-up with primary care this week for reevaluation, medication management, follow-up incidental findings on CT and for further evaluation/MRI, physical therapy or specialty evaluation if headache, neck pain persist.    Suspect mild concussion, avoid vigorous activity, prolonged screen time or other significant focusing until symptoms have improved.    Motrin 600 mg every 6 hours as needed for discomfort.  Robaxin every 6 hours as needed for pain or spasm.    Slow stretching and range of motion exercises encouraged.  Light activity as tolerated.    Return to the emergency department for persistent or worsening headache, altered mental status, visual changes, slurred speech, focal weakness, seizure, neck pain, chest pain, shortness of breath or other new concerns.

## 2025-03-16 NOTE — ED PROVIDER NOTES
ED Provider Note    No diagnosis found.     Electronically signed by: Sharon Mosley M.D., 3/16/2025 10:54 AM
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
## 2025-03-16 NOTE — ED NOTES
Pt ambulated to rm 15 from triage.  Agree with triage note.  Pt c/o lateral neck pain bilaterally.  Also c/o sl midline neck discomfort with palpation.  Cms intact.  Denies any deficits

## 2025-03-16 NOTE — ED NOTES
PT ambulated to DOC 15 with a steady gate. C/C is HEADACHE AFTER FALL. Pt is in a gown, on the monitor, and call light is within reach. Chart up for ERP. FAMILY BY BEDSIDE

## 2025-04-16 ENCOUNTER — APPOINTMENT (OUTPATIENT)
Dept: RADIOLOGY | Facility: MEDICAL CENTER | Age: 65
End: 2025-04-16
Attending: STUDENT IN AN ORGANIZED HEALTH CARE EDUCATION/TRAINING PROGRAM
Payer: COMMERCIAL

## 2025-04-16 DIAGNOSIS — Z12.31 VISIT FOR SCREENING MAMMOGRAM: ICD-10-CM

## 2025-04-16 PROCEDURE — 77067 SCR MAMMO BI INCL CAD: CPT

## 2025-04-17 ENCOUNTER — RESULTS FOLLOW-UP (OUTPATIENT)
Dept: MEDICAL GROUP | Facility: LAB | Age: 65
End: 2025-04-17

## 2025-05-01 DIAGNOSIS — E78.2 MIXED HYPERLIPIDEMIA: Chronic | ICD-10-CM

## 2025-05-01 RX ORDER — ATORVASTATIN CALCIUM 20 MG/1
20 TABLET, FILM COATED ORAL
Qty: 90 TABLET | Refills: 2 | Status: SHIPPED | OUTPATIENT
Start: 2025-05-01

## 2025-06-16 ENCOUNTER — OFFICE VISIT (OUTPATIENT)
Dept: OPHTHALMOLOGY | Facility: MEDICAL CENTER | Age: 65
End: 2025-06-16
Payer: COMMERCIAL

## 2025-06-16 DIAGNOSIS — E04.2 MULTIPLE THYROID NODULES: ICD-10-CM

## 2025-06-16 DIAGNOSIS — H43.813 PVD (POSTERIOR VITREOUS DETACHMENT), BILATERAL: ICD-10-CM

## 2025-06-16 DIAGNOSIS — I63.9 CEREBROVASCULAR ACCIDENT (CVA), UNSPECIFIED MECHANISM (HCC): ICD-10-CM

## 2025-06-16 DIAGNOSIS — H52.13 MYOPIA OF BOTH EYES: ICD-10-CM

## 2025-06-16 DIAGNOSIS — H02.834 DERMATOCHALASIS OF LEFT UPPER EYELID: ICD-10-CM

## 2025-06-16 DIAGNOSIS — H35.54 RETINAL PIGMENT EPITHELIAL ATROPHY: ICD-10-CM

## 2025-06-16 DIAGNOSIS — H25.13 AGE-RELATED NUCLEAR CATARACT OF BOTH EYES: ICD-10-CM

## 2025-06-16 DIAGNOSIS — H40.003 GLAUCOMA SUSPECT OF BOTH EYES: Primary | ICD-10-CM

## 2025-06-16 PROCEDURE — 99214 OFFICE O/P EST MOD 30 MIN: CPT | Mod: 25 | Performed by: OPHTHALMOLOGY

## 2025-06-16 PROCEDURE — 92250 FUNDUS PHOTOGRAPHY W/I&R: CPT | Performed by: OPHTHALMOLOGY

## 2025-06-16 ASSESSMENT — REFRACTION_WEARINGRX
OD_CYLINDER: +2.00
OS_AXIS: 090
OD_ADD: +2.50
OS_AXIS: 090
OS_ADD: +2.50
SPECS_TYPE: TRIFOCAL
OD_SPHERE: -2.00
OD_AXIS: 085
OS_SPHERE: -2.25
OS_SPHERE: -2.25
OD_AXIS: 090
OD_ADD: +2.75
OS_ADD: +2.75
SPECS_TYPE: TRIFOCAL
OS_CYLINDER: +1.50
OD_SPHERE: -2.00
OS_CYLINDER: +1.50
OD_CYLINDER: +2.00

## 2025-06-16 ASSESSMENT — SLIT LAMP EXAM - LIDS
COMMENTS: PTOSIS, DERMATOCHALASIS - UPPER LID
COMMENTS: NORMAL

## 2025-06-16 ASSESSMENT — REFRACTION_MANIFEST
OS_CYLINDER: +1.25
OS_AXIS: 103
OD_SPHERE: -0.75
OD_AXIS: 079
METHOD_AUTOREFRACTION: 1
OD_CYLINDER: +1.75
OS_SPHERE: -1.25

## 2025-06-16 ASSESSMENT — VISUAL ACUITY
CORRECTION_TYPE: GLASSES
OS_CC: J1+
OD_CC: 20/20
OD_CC: J1+
METHOD: SNELLEN - LINEAR
OS_CC: 20/25+2

## 2025-06-16 ASSESSMENT — PACHYMETRY
OD_CT(UM): 16
OS_CT(UM): 16

## 2025-06-16 ASSESSMENT — TONOMETRY
OS_IOP_MMHG: 19
OD_IOP_MMHG: 17

## 2025-06-16 ASSESSMENT — EXTERNAL EXAM - LEFT EYE: OS_EXAM: NORMAL

## 2025-06-16 ASSESSMENT — CUP TO DISC RATIO
OS_RATIO: 0.2
OD_RATIO: 0.2

## 2025-06-16 ASSESSMENT — EXTERNAL EXAM - RIGHT EYE: OD_EXAM: NORMAL

## 2025-06-16 ASSESSMENT — ENCOUNTER SYMPTOMS: PHOTOPHOBIA: 1

## 2025-06-16 NOTE — ASSESSMENT & PLAN NOTE
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  No central RPE changes  6/16/2025-no progression

## 2025-06-16 NOTE — ASSESSMENT & PLAN NOTE
8/7/2019 - Posterior vitreous detachments most likely leading to flashes and floaters. No retinal holes or tears, no peripheral retinal pigmentary changes. Also obtain OCT NFL thickness that was 79 OD and 79 OS with normal ganglion cell thickness, so no evidence of an optic neuropathy or retrograde axonal degeneration. Thus discussed the warning signs of retinal holes and tears. Recommended re-eval retina in 6 months or sooner if symptoms worsen.   2/10/2020 - Overall stable. Some minimal peripheral pigmentary changes, but I do not detect any tears of holes. Would like to re-eval in 1 years. Discussed that if symptoms worsen will refer to retina.  2/8/2021 - improved, no tears or holes  4/25/2022 - Stable, no tears or holes  6/10/2024 -no progression.  6/16/2025-stable

## 2025-06-16 NOTE — ASSESSMENT & PLAN NOTE
6/16/2025-was found to have small posterior fossa vascular abnormality that was embolized.  No associated extraocular motility abnormality.

## 2025-06-16 NOTE — ASSESSMENT & PLAN NOTE
2/8/2021 - mild left upper lid dermatochalasis leading to some ptosis. No obscuring visual axis.   4/25/2022 - gave name of Daniel De La Torre  4/24/2023--overall stable  6/10/2024-no progression  6/16/2025-stable

## 2025-06-16 NOTE — PROGRESS NOTES
Peds/Neuro Ophthalmology:   Daniel Tatum M.D.    Date & Time note created:    6/16/2025   10:45 AM     Referring MD / APRN:  Felix Campos D.O., No att. providers found    Patient ID:  Name:             Lily Sawant   YOB: 1960  Age:                 65 y.o.  female   MRN:               [id number]    Chief Complaint/Reason for Visit:     Other (Glaucoma suspect,Cataracts/)      History of Present Illness:    Liyl Sawant is a 65 y.o. female   1 year follow up cataracts and Glaucoma suspect.Vision seems stable.+ for stroke and malformation September of 2024.No double vision or headaches.    Other        Review of Systems:  Review of Systems   Eyes:  Positive for photophobia.   All other systems reviewed and are negative.      Past Medical History:   Past Medical History[1]    Past Surgical History:  Past Surgical History[2]    Current Outpatient Medications:  Current Medications[3]    Allergies:  Allergies[4]    Family History:  Family History   Problem Relation Age of Onset    Arthritis Mother     Glasses Mother     Other Mother     Heart Disease Mother     Cancer Sister         Melanoma    Other Sister     Cancer Father         Renal & Lung    Lung Disease Father     Glasses Father     Cancer Brother         Melanoma - eye    Other Brother     Glaucoma Brother     Glasses Child     Other Child         SIDS    Glasses Child     Glasses Child     Cancer Paternal Grandfather         Renal    Heart Disease Maternal Grandmother     Heart Attack Maternal Uncle 65        MI       Social History:  Social History     Socioeconomic History    Marital status:      Spouse name: Not on file    Number of children: Not on file    Years of education: Not on file    Highest education level: Master's degree (e.g., MA, MS, Lefty, MEd, MSW, GARY)   Occupational History    Not on file   Tobacco Use    Smoking status: Former     Current packs/day: 0.00     Average packs/day: 0.3 packs/day  for 3.0 years (0.8 ttl pk-yrs)     Types: Cigarettes     Start date: 1975     Quit date: 1978     Years since quittin.3    Smokeless tobacco: Never   Vaping Use    Vaping status: Never Used   Substance and Sexual Activity    Alcohol use: Yes     Alcohol/week: 3.0 oz     Types: 5 Glasses of wine per week     Comment: weekly    Drug use: No    Sexual activity: Yes     Partners: Male     Birth control/protection: None   Other Topics Concern    Not on file   Social History Narrative    , has 3 kids     works part time      Social Drivers of Health     Financial Resource Strain: Low Risk  (2024)    Overall Financial Resource Strain (CARDIA)     Difficulty of Paying Living Expenses: Not hard at all   Food Insecurity: No Food Insecurity (2024)    Hunger Vital Sign     Worried About Running Out of Food in the Last Year: Never true     Ran Out of Food in the Last Year: Never true   Transportation Needs: No Transportation Needs (2024)    PRAPARE - Transportation     Lack of Transportation (Medical): No     Lack of Transportation (Non-Medical): No   Physical Activity: Sufficiently Active (2024)    Exercise Vital Sign     Days of Exercise per Week: 6 days     Minutes of Exercise per Session: 60 min   Stress: No Stress Concern Present (2024)    [employer] - Occupational Stress Questionnaire     Feeling of Stress : Not at all   Social Connections: Socially Integrated (2024)    Social Connection and Isolation Panel [NHANES]     Frequency of Communication with Friends and Family: More than three times a week     Frequency of Social Gatherings with Friends and Family: More than three times a week     Attends Latter-day Services: More than 4 times per year     Active Member of Clubs or Organizations: Yes     Attends Club or Organization Meetings: More than 4 times per year     Marital Status:    Intimate Partner Violence: Not At Risk (2024)     Humiliation, Afraid, Rape, and Kick questionnaire     Fear of Current or Ex-Partner: No     Emotionally Abused: No     Physically Abused: No     Sexually Abused: No   Housing Stability: Unknown (8/30/2024)    Housing Stability Vital Sign     Unable to Pay for Housing in the Last Year: No     Number of Times Moved in the Last Year: Not on file     Homeless in the Last Year: No          Physical Exam:  Physical Exam    Oriented x 3  Weight/BMI: There is no height or weight on file to calculate BMI.  There were no vitals taken for this visit.    Base Eye Exam       Visual Acuity (Snellen - Linear)         Right Left    Dist cc 20/20 20/25+2    Near cc J1+ J1+      Correction: Glasses              Tonometry (i care, 8:46 AM)         Right Left    Pressure 17 19              Pupils         Pupils    Right PERRL    Left PERRL              Neuro/Psych       Oriented x3: Yes    Mood/Affect: Normal                  Additional Tests       Color         Right Left    Ishihara 9/9 9/9              Stereo       Fly: +    Animals: 3/3    Circles: 8/9                  Slit Lamp and Fundus Exam       External Exam         Right Left    External Normal Normal              Slit Lamp Exam         Right Left    Lids/Lashes Normal Ptosis, Dermatochalasis - upper lid    Conjunctiva/Sclera White and quiet White and quiet    Cornea Clear Clear    Anterior Chamber Deep and quiet Deep and quiet    Iris Round and reactive Round and reactive    Lens Nuclear sclerosis Nuclear sclerosis    Vitreous Normal Normal              Fundus Exam         Right Left    Disc Normal Normal    C/D Ratio 0.2 0.2    Macula Normal Normal    Vessels Normal Normal    Periphery PVD, Pigmentation PVD, Pigmentation                  Refraction       Wearing Rx         Sphere Cylinder Axis Add    Right -2.00 +2.00 090 +2.50    Left -2.25 +1.50 090 +2.50      Age: 1yrs    Type: Trifocal              Wearing Rx #2         Sphere Cylinder Axis Add    Right -2.00 +2.00 085  +2.75    Left -2.25 +1.50 090 +2.75      Type: Trifocal              Manifest Refraction (Auto)         Sphere Cylinder Axis    Right -0.75 +1.75 079    Left -1.25 +1.25 103              Final Rx         Sphere Cylinder Axis Add    Right -2.00 +2.00 085 +2.75    Left -2.25 +1.50 090 +2.75                    Pertinent Lab/Test/Imaging Review:      Assessment and Plan:     Stroke (HCC)  6/16/2025-was found to have small posterior fossa vascular abnormality that was embolized.  No associated extraocular motility abnormality.    Age-related nuclear cataract of both eyes  2/8/2021 - early ns cataracts. No visually signficant  4/25/2022 - slight progression  4/24/2023 -no significant progression since last year  6/10/2024 -overall stable.  No significant progression.  Still 20/20 minus with glasses Rx  6/16/2025-no progression    Dermatochalasis of left upper eyelid  2/8/2021 - mild left upper lid dermatochalasis leading to some ptosis. No obscuring visual axis.   4/25/2022 - gave name of Daniel De La Torre  4/24/2023--overall stable  6/10/2024-no progression  6/16/2025-stable    Glaucoma suspect of both eyes  2/8/2021 - OCT NFL thickness normal at 80 OD and 79 OS, stable  4/25/2022 - Ave NFl thickness stable at 79 OD and 76 OS, with tilting.   4/24/2023 -IOP normal  6/10/2024 -IOP stable no change in OCT neurofibrillary thickness.  80 OD 75 OS.  Stable tilted nerve especially on the left with peripapillary crescent  6/16/2025-IOP stable.  No progressive cupping or atrophy.  OCT neurofibrillary thickness 80 OD 75 OS    Multiple thyroid nodules  6/16/2025-no evidence of thyroid orbitopathy    Myopia of both eyes  4/25/2022 - No change in rx  4/24/2023 -never got the new Rx.  No change.  Will give Rx.  Slight increase in bifocal.  6/10/2024 -continue current Rx  6/16/2025-doing well with current Rx no change needed    PVD (posterior vitreous detachment), bilateral  8/7/2019 - Posterior vitreous detachments most likely leading to  "flashes and floaters. No retinal holes or tears, no peripheral retinal pigmentary changes. Also obtain OCT NFL thickness that was 79 OD and 79 OS with normal ganglion cell thickness, so no evidence of an optic neuropathy or retrograde axonal degeneration. Thus discussed the warning signs of retinal holes and tears. Recommended re-eval retina in 6 months or sooner if symptoms worsen.   2/10/2020 - Overall stable. Some minimal peripheral pigmentary changes, but I do not detect any tears of holes. Would like to re-eval in 1 years. Discussed that if symptoms worsen will refer to retina.  2/8/2021 - improved, no tears or holes  4/25/2022 - Stable, no tears or holes  6/10/2024 -no progression.  6/16/2025-stable    Retinal pigment epithelial atrophy  6/10/2024-stable retinal pigment hypoplasia outside of posterior pole.  No central RPE changes  6/16/2025-no progression        Daniel Tatum M.D.         [1]   Past Medical History:  Diagnosis Date    Celiac disease 02/13/2015    Family history of ischemic heart disease (IHD)     FH: renal cell carcinoma 02/13/2015    Hemorrhagic disorder (HCC) 08/2024    History of \"bleeding stroke\"    High cholesterol     Hyperlipidemia 02/13/2015    Hypertension     Denies    Migraine headache 02/13/2015    Stroke (HCC) 08/2024    \"bleeding\" stroke    Thyroid disease     nodules   [2]   Past Surgical History:  Procedure Laterality Date    HYSTEROSCOPY WITH MYOSURE N/A 08/23/2023    Procedure: HYSTEROSCOPY DILATION AND CURETTAGE, POLYPECTOMY;  Surgeon: Vivian De La Paz D.O.;  Location: SURGERY SAME DAY [facility];  Service: Obstetrics    DILATION AND CURETTAGE N/A 08/23/2023    Procedure: DILATION AND CURETTAGE;  Surgeon: Vivian De La Paz D.O.;  Location: SURGERY SAME DAY [facility];  Service: Obstetrics    DILATION AND EVACUATION  1985 & 1987    OTHER      Thyroid biopsy    OTHER      \"gynecological biopsy\"    TONSILLECTOMY     [3]   Current Outpatient Medications   Medication " Sig Dispense Refill    atorvastatin (LIPITOR) 20 MG Tab TAKE 1 TABLET BY MOUTH ONCE DAILY AT BEDTIME 90 Tablet 2    risedronate (ACTONEL) 35 MG Tab Take 1 Tablet by mouth every 7 days. 90 day 12 Tablet 3    doxylamine (SLEEP AID) 25 MG Tab tablet Take 12.5 mg by mouth at bedtime as needed (Sleep). 1/2 tablet = 12.5mg      estradiol (ESTRACE) 0.1 MG/GM vaginal cream Insert 0.5-1g nightly x 1 week then twice weekly thereafter for maintenance therapy 42.5 g 3    methocarbamol (ROBAXIN) 500 MG Tab Take 1 Tablet by mouth every 6 hours as needed (muscle pain or spasm). 20 Tablet 0    ibuprofen (MOTRIN) 600 MG Tab Take 1 Tablet by mouth every 6 hours as needed for Moderate Pain or Mild Pain. 20 Tablet 0    ibuprofen (MOTRIN) 200 MG Tab Take 400 mg by mouth every 8 hours as needed for Headache.       No current facility-administered medications for this visit.   [4]   Allergies  Allergen Reactions    Gluten Meal Diarrhea     Gluten intolerance     Pineapple Diarrhea     Diarrhea

## 2025-06-16 NOTE — ASSESSMENT & PLAN NOTE
2/8/2021 - OCT NFL thickness normal at 80 OD and 79 OS, stable  4/25/2022 - Ave NFl thickness stable at 79 OD and 76 OS, with tilting.   4/24/2023 -IOP normal  6/10/2024 -IOP stable no change in OCT neurofibrillary thickness.  80 OD 75 OS.  Stable tilted nerve especially on the left with peripapillary crescent  6/16/2025-IOP stable.  No progressive cupping or atrophy.  OCT neurofibrillary thickness 80 OD 75 OS

## 2025-06-16 NOTE — ASSESSMENT & PLAN NOTE
4/25/2022 - No change in rx  4/24/2023 -never got the new Rx.  No change.  Will give Rx.  Slight increase in bifocal.  6/10/2024 -continue current Rx  6/16/2025-doing well with current Rx no change needed

## 2025-06-30 ENCOUNTER — HOSPITAL ENCOUNTER (OUTPATIENT)
Dept: RADIOLOGY | Facility: MEDICAL CENTER | Age: 65
End: 2025-06-30
Attending: INTERNAL MEDICINE
Payer: MEDICARE

## 2025-06-30 ENCOUNTER — HOSPITAL ENCOUNTER (OUTPATIENT)
Dept: RADIOLOGY | Facility: MEDICAL CENTER | Age: 65
End: 2025-06-30
Attending: INTERNAL MEDICINE
Payer: COMMERCIAL

## 2025-06-30 ENCOUNTER — RESULTS FOLLOW-UP (OUTPATIENT)
Dept: ENDOCRINOLOGY | Facility: MEDICAL CENTER | Age: 65
End: 2025-06-30

## 2025-06-30 DIAGNOSIS — E04.2 MULTIPLE THYROID NODULES: ICD-10-CM

## 2025-06-30 DIAGNOSIS — Z80.51 FAMILY HISTORY OF RENAL CANCER: ICD-10-CM

## 2025-06-30 DIAGNOSIS — N28.1 CYST OF LEFT KIDNEY: ICD-10-CM

## 2025-06-30 PROCEDURE — 76775 US EXAM ABDO BACK WALL LIM: CPT

## 2025-06-30 PROCEDURE — 76536 US EXAM OF HEAD AND NECK: CPT

## 2025-07-01 DIAGNOSIS — N28.1 CYST OF LEFT KIDNEY: Primary | ICD-10-CM

## 2025-07-07 NOTE — Clinical Note
REFERRAL APPROVAL NOTICE         Sent on July 7, 2025                   Lily Lugo Jese  [address]  [place] [address]                   Dear Ms. Sawant,    After a careful review of the medical information and benefit coverage, [facility] has processed your referral. See below for additional details.    If applicable, you must be actively enrolled with your insurance for coverage of the authorized service. If you have any questions regarding your coverage, please contact your insurance directly.    REFERRAL INFORMATION   Referral #:  [id number]  Referred-To Department    Referred-By Provider:  Urology    Bernabe Mann M.D.   [facility]      [address]  [place] [id number] [phone] [address] [facility] [address]  [place] [address] [phone]    Referral Start Date:  07/01/2025  Referral End Date:   07/01/2026             SCHEDULING  If you do not already have an appointment, please call [phone] to make an appointment.     MORE INFORMATION  If you do not already have a [facility] [employer] account, sign up at: [email or web address]  You can access your medical information, make appointments, see lab results, billing information, and more.  If you have questions regarding this referral, please contact  the [facility] Referrals department at:             [phone]. Monday - Friday 8:00AM - 5:00PM.     Sincerely,    [facility]

## 2025-07-21 ENCOUNTER — TELEMEDICINE (OUTPATIENT)
Dept: UROLOGY | Facility: MEDICAL CENTER | Age: 65
End: 2025-07-21
Payer: COMMERCIAL

## 2025-07-21 DIAGNOSIS — Z80.51 FAMILY HISTORY OF RENAL CANCER: ICD-10-CM

## 2025-07-21 DIAGNOSIS — Z80.8 FAMILY HISTORY OF MALIGNANT MELANOMA: ICD-10-CM

## 2025-07-21 DIAGNOSIS — N28.1 COMPLEX RENAL CYST: Primary | ICD-10-CM

## 2025-07-21 NOTE — PROGRESS NOTES
Chief Complaint: renal cyst    The patient was referred by Bernabe Mann MD, for evaluation of the above chief complaint    History of Present Illness:    Lily Sawant is a 65 y.o. female who presents today for a renal cyst  - Long-standing renal cyst  - Currently 4-6 cm in size  - Increased from previous 2023  - Remains asymptomatic    - Family history of kidney cancer (grandfather  of disease, father s/p nephrectomy)  - Family history of melanoma    Subjective    Family History   Problem Relation Age of Onset    Arthritis Mother     Glasses Mother     Other Mother     Heart Disease Mother     Cancer Sister         Melanoma    Other Sister     Cancer Father         Renal & Lung    Lung Disease Father     Glasses Father     Cancer Brother         Melanoma - eye    Other Brother     Glaucoma Brother     Glasses Child     Other Child         SIDS    Glasses Child     Glasses Child     Cancer Paternal Grandfather         Renal    Heart Disease Maternal Grandmother     Heart Attack Maternal Uncle 65        MI     Social History     Socioeconomic History    Marital status:      Spouse name: Not on file    Number of children: Not on file    Years of education: Not on file    Highest education level: Master's degree (e.g., MA, MS, Lefty, MEd, MSW, GARY)   Occupational History    Not on file   Tobacco Use    Smoking status: Former     Current packs/day: 0.00     Average packs/day: 0.3 packs/day for 3.0 years (0.8 ttl pk-yrs)     Types: Cigarettes     Start date: 1975     Quit date: 1978     Years since quittin.4    Smokeless tobacco: Never   Vaping Use    Vaping status: Never Used   Substance and Sexual Activity    Alcohol use: Yes     Alcohol/week: 3.0 oz     Types: 5 Glasses of wine per week     Comment: weekly    Drug use: No    Sexual activity: Yes     Partners: Male     Birth control/protection: None   Other Topics Concern    Not on file   Social History Narrative    , has 3  kids     works part time      Social Drivers of Health     Financial Resource Strain: Low Risk  (8/8/2024)    Overall Financial Resource Strain (CARDIA)     Difficulty of Paying Living Expenses: Not hard at all   Food Insecurity: No Food Insecurity (8/30/2024)    Hunger Vital Sign     Worried About Running Out of Food in the Last Year: Never true     Ran Out of Food in the Last Year: Never true   Transportation Needs: No Transportation Needs (8/30/2024)    PRAPARE - Transportation     Lack of Transportation (Medical): No     Lack of Transportation (Non-Medical): No   Physical Activity: Sufficiently Active (8/8/2024)    Exercise Vital Sign     Days of Exercise per Week: 6 days     Minutes of Exercise per Session: 60 min   Stress: No Stress Concern Present (8/8/2024)    [employer] - Occupational Stress Questionnaire     Feeling of Stress : Not at all   Social Connections: Socially Integrated (8/8/2024)    Social Connection and Isolation Panel [NHANES]     Frequency of Communication with Friends and Family: More than three times a week     Frequency of Social Gatherings with Friends and Family: More than three times a week     Attends Restorationism Services: More than 4 times per year     Active Member of Clubs or Organizations: Yes     Attends Club or Organization Meetings: More than 4 times per year     Marital Status:    Intimate Partner Violence: Not At Risk (8/30/2024)    Humiliation, Afraid, Rape, and Kick questionnaire     Fear of Current or Ex-Partner: No     Emotionally Abused: No     Physically Abused: No     Sexually Abused: No   Housing Stability: Unknown (8/30/2024)    Housing Stability Vital Sign     Unable to Pay for Housing in the Last Year: No     Number of Times Moved in the Last Year: Not on file     Homeless in the Last Year: No     Past Surgical History[1]  Past Medical History[2]  Current Medications[3]  Allergies[4]    Review of systems was conducted and was  negative except for as explicitly listed in the History of Present Illness.       Objective   There were no vitals taken for this visit.  Physical Exam    Lab/Radiology/Diagnostic Review:  CBC   Lab Results   Component Value Date/Time    WBC 6.5 03/16/2025 1139    RBC 4.70 03/16/2025 1139    HEMOGLOBIN 14.8 03/16/2025 1139    HEMATOCRIT 44.7 03/16/2025 1139    MCV 95.1 03/16/2025 1139    MCH 31.5 03/16/2025 1139    MCHC 33.1 03/16/2025 1139    RDW 42.1 03/16/2025 1139    MPV 10.2 03/16/2025 1139    LYMPHOCYTES 18.50 (L) 03/16/2025 1139    LYMPHS 1.20 03/16/2025 1139    MONOCYTES 5.80 03/16/2025 1139    MONOS 0.38 03/16/2025 1139    EOSINOPHILS 0.50 03/16/2025 1139    EOS 0.03 03/16/2025 1139    BASOPHILS 0.50 03/16/2025 1139    BASO 0.03 03/16/2025 1139    NRBC 0.00 03/16/2025 1139       BMP   Lab Results   Component Value Date/Time    SODIUM 140 03/16/2025 1139    POTASSIUM 4.2 03/16/2025 1139    CHLORIDE 104 03/16/2025 1139    CO2 24 03/16/2025 1139    GLUCOSE 93 03/16/2025 1139    BUN 12 03/16/2025 1139    CREATININE 0.80 03/16/2025 1139    CALCIUM 10.0 03/16/2025 1139     US RENAL   US-RENAL 06/30/2025    Narrative  6/30/2025 2:01 PM    HISTORY/REASON FOR EXAM:  Renal cyst      TECHNIQUE/EXAM DESCRIPTION:  Renal ultrasound.    COMPARISON:  1/24/2023    FINDINGS:    The right kidney measures 10.78 cm.  The right kidney appears normal in contour and parenchymal echotexture. The corticomedullary differentiation is preserved. Mild pelvocaliectasis. There are no renal calculi.    The left kidney measures 12.27 cm. There is a 6.3 x 4.9 x 4.6 cm upper pole cyst. The left renal collecting system is not dilated. No hydronephrosis. There are no renal calculi.    The bladder demonstrates no focal wall abnormality. There is a 6 mL postvoid residual.    Impression  1.  Mild right-sided pelvocaliectasis.  2.  Left renal cyst has increased in size.    Cyst has some mild complexity with irregularity/asymmetry    I have reviewed  "and independently examined the lab and imaging results.  My findings are given in the HPI or above with the associated tests.        Assessment & Plan    It was a pleasure speaking with Lily Sawant today.    This evaluation was conducted via Teams using secure and encrypted videoconferencing technology. The patient was in their home in the [facility].  The patient's identity was confirmed and verbal consent was obtained for this virtual visit..  Our visit started at 2:45 Pm and ended at 3:00 PM.  The total visit time, including chart review and documentation, was approximately 18 minutes.    Lily Sawant is a 65 y.o. female w/ a complex renal cyst  - Discussed AUA and NCCN guidelines  - Discussed low likelihood of malignancy  - However, given family history of kidney cancer and mild complexity, recommend MRI  - Also recommend germline testing  - Will investigate deeper sequencing versus HNP       [1]   Past Surgical History:  Procedure Laterality Date    HYSTEROSCOPY WITH MYOSURE N/A 08/23/2023    Procedure: HYSTEROSCOPY DILATION AND CURETTAGE, POLYPECTOMY;  Surgeon: Vivian De La Paz D.O.;  Location: SURGERY SAME DAY [facility];  Service: Obstetrics    DILATION AND CURETTAGE N/A 08/23/2023    Procedure: DILATION AND CURETTAGE;  Surgeon: Vivian De La Paz D.O.;  Location: SURGERY SAME DAY [facility];  Service: Obstetrics    DILATION AND EVACUATION  1985 & 1987    OTHER      Thyroid biopsy    OTHER      \"gynecological biopsy\"    TONSILLECTOMY     [2]   Past Medical History:  Diagnosis Date    Celiac disease 02/13/2015    Family history of ischemic heart disease (IHD)     FH: renal cell carcinoma 02/13/2015    Hemorrhagic disorder (HCC) 08/2024    History of \"bleeding stroke\"    High cholesterol     Hyperlipidemia 02/13/2015    Hypertension     Denies    Migraine headache 02/13/2015    Stroke (HCC) 08/2024    \"bleeding\" stroke    Thyroid disease     nodules   [3]   Current Outpatient Medications "   Medication Sig Dispense Refill    atorvastatin (LIPITOR) 20 MG Tab TAKE 1 TABLET BY MOUTH ONCE DAILY AT BEDTIME 90 Tablet 2    risedronate (ACTONEL) 35 MG Tab Take 1 Tablet by mouth every 7 days. 90 day 12 Tablet 3    doxylamine (SLEEP AID) 25 MG Tab tablet Take 12.5 mg by mouth at bedtime as needed (Sleep). 1/2 tablet = 12.5mg      estradiol (ESTRACE) 0.1 MG/GM vaginal cream Insert 0.5-1g nightly x 1 week then twice weekly thereafter for maintenance therapy 42.5 g 3     No current facility-administered medications for this visit.   [4]   Allergies  Allergen Reactions    Gluten Meal Diarrhea     Gluten intolerance     Pineapple Diarrhea     Diarrhea

## 2025-07-22 ENCOUNTER — RESEARCH ENCOUNTER (OUTPATIENT)
Dept: RESEARCH | Facility: MEDICAL CENTER | Age: 65
End: 2025-07-22
Payer: COMMERCIAL

## 2025-07-22 NOTE — RESEARCH NOTE
Patient has been referred by Hesham Kidd M.D. The initial referral follow-up message, which includes the consent form link, has been sent to the patient.    Eligible Studies: HNP

## 2025-07-24 DIAGNOSIS — Z00.6 CLINICAL TRIAL PARTICIPANT: ICD-10-CM

## 2025-07-24 NOTE — RESEARCH NOTE
Patient has signed the consent form.    The applicable research collection order(s) have been created, triggering the scheduling ticket to be sent to the patient via [employer].Follow-up message has been sent to the patient.    Patient is ready for scheduling.

## 2025-07-28 ENCOUNTER — HOSPITAL ENCOUNTER (OUTPATIENT)
Dept: LAB | Facility: MEDICAL CENTER | Age: 65
End: 2025-07-28
Attending: FAMILY MEDICINE

## 2025-07-28 DIAGNOSIS — Z00.6 CLINICAL TRIAL PARTICIPANT: ICD-10-CM

## 2025-08-04 ENCOUNTER — HOSPITAL ENCOUNTER (OUTPATIENT)
Facility: MEDICAL CENTER | Age: 65
End: 2025-08-04
Attending: PLASTIC SURGERY
Payer: COMMERCIAL

## 2025-08-04 LAB — PATHOLOGY CONSULT NOTE: NORMAL

## 2025-08-04 PROCEDURE — 88305 TISSUE EXAM BY PATHOLOGIST: CPT | Mod: 26 | Performed by: PATHOLOGY

## 2025-08-04 PROCEDURE — 88305 TISSUE EXAM BY PATHOLOGIST: CPT | Performed by: PATHOLOGY

## 2025-08-06 ENCOUNTER — PATIENT MESSAGE (OUTPATIENT)
Dept: MEDICAL GROUP | Facility: LAB | Age: 65
End: 2025-08-06
Payer: COMMERCIAL

## 2025-08-06 DIAGNOSIS — N95.2 VAGINAL ATROPHY: ICD-10-CM

## 2025-08-07 RX ORDER — ESTRADIOL 0.1 MG/G
CREAM VAGINAL
Qty: 42.5 G | Refills: 0 | Status: SHIPPED | OUTPATIENT
Start: 2025-08-07

## 2025-08-09 LAB
APOB+LDLR+PCSK9 GENE MUT ANL BLD/T: NOT DETECTED
BRCA1+BRCA2 DEL+DUP + FULL MUT ANL BLD/T: NOT DETECTED
MLH1+MSH2+MSH6+PMS2 GN DEL+DUP+FUL M: NOT DETECTED

## 2025-08-11 ENCOUNTER — RESULTS FOLLOW-UP (OUTPATIENT)
Dept: MEDICAL GROUP | Facility: PHYSICIAN GROUP | Age: 65
End: 2025-08-11
Payer: COMMERCIAL

## 2025-10-30 ENCOUNTER — APPOINTMENT (OUTPATIENT)
Dept: MEDICAL GROUP | Facility: LAB | Age: 65
End: 2025-10-30
Payer: COMMERCIAL

## (undated) DEVICE — SENSOR OXIMETER ADULT SPO2 RD SET (20EA/BX)

## (undated) DEVICE — WATER IRRIGATION STERILE 1000ML (12EA/CA)

## (undated) DEVICE — TOWEL STOP TIMEOUT SAFETY FLAG (40EA/CA)

## (undated) DEVICE — Device

## (undated) DEVICE — GOWN WARMING STANDARD FLEX - (30/CA)

## (undated) DEVICE — SET TUBING AQUILEX OUT-FLOW MYOSURE (10EA/BX)

## (undated) DEVICE — MASK AIRWAY FLEXIBLE SINGLE-USE SIZE 4 ADULTS (10EA/BX)

## (undated) DEVICE — SET LEADWIRE 5 LEAD BEDSIDE DISPOSABLE ECG (1SET OF 5/EA)

## (undated) DEVICE — KIT D & C COLLECTION (10EA/PK)

## (undated) DEVICE — TUBE CONNECTING SUCTION - CLEAR PLASTIC STERILE 72 IN (50EA/CA)

## (undated) DEVICE — SPECIMEN PLASTIC CONVERTOR - (10/CA)

## (undated) DEVICE — CANISTER SUCTION 3000ML MECHANICAL FILTER AUTO SHUTOFF MEDI-VAC NONSTERILE LF DISP  (40EA/CA)

## (undated) DEVICE — SODIUM CHL. IRRIGATION 0.9% 3000ML (4EA/CA 65CA/PF)

## (undated) DEVICE — MASK OXYGEN VNYL ADLT MED CONC WITH 7 FOOT TUBING  - (50EA/CA)

## (undated) DEVICE — SUCTION INSTRUMENT YANKAUER BULBOUS TIP W/O VENT (50EA/CA)

## (undated) DEVICE — SODIUM CHL IRRIGATION 0.9% 1000ML (12EA/CA)

## (undated) DEVICE — LACTATED RINGERS INJ 1000 ML - (14EA/CA 60CA/PF)

## (undated) DEVICE — PAD SANITARY 11IN MAXI IND WRAPPED  (12EA/PK 24PK/CA)

## (undated) DEVICE — CANISTER SUCTION RIGID RED 1500CC (40EA/CA)

## (undated) DEVICE — CANNULA O2 COMFORT SOFT EAR ADULT 7 FT TUBING (50/CA)

## (undated) DEVICE — TUBING CLEARLINK DUO-VENT - C-FLO (48EA/CA)

## (undated) DEVICE — GLOVE BIOGEL PI ORTHO SZ 6 SURGICAL PF LF (40PR/BX)

## (undated) DEVICE — SLEEVE VASO CALF MED - (10PR/CA)

## (undated) DEVICE — KIT  I.V. START (100EA/CA)

## (undated) DEVICE — SET TUBING AQUILEX IN-FLOW MYOSURE (10EA/BX)

## (undated) DEVICE — TUBING D & E COLLECTION SET (50EA/PK)